# Patient Record
Sex: FEMALE | Race: WHITE | NOT HISPANIC OR LATINO | Employment: FULL TIME | URBAN - METROPOLITAN AREA
[De-identification: names, ages, dates, MRNs, and addresses within clinical notes are randomized per-mention and may not be internally consistent; named-entity substitution may affect disease eponyms.]

---

## 2017-01-30 ENCOUNTER — TELEPHONE (OUTPATIENT)
Dept: ENDOCRINOLOGY | Facility: CLINIC | Age: 27
End: 2017-01-30

## 2017-01-30 DIAGNOSIS — E10.9 TYPE 1 DIABETES MELLITUS WITHOUT COMPLICATION (H): Primary | ICD-10-CM

## 2017-01-30 DIAGNOSIS — E10.9 DIABETES MELLITUS TYPE 1 (H): ICD-10-CM

## 2017-01-30 NOTE — TELEPHONE ENCOUNTER
----- Message from Josiane Cantrell MD sent at 1/30/2017  3:15 PM CST -----  Regarding: RE: High BS  Contact: [phone]  Called her and left message asking if she was feeling sick in anyway such as UTI, URI.  If so she should call back and let us know what her symptoms are.    If she feels well I told her to change correction from the 1 to 52 I have in my last note  To 1 for 40 and to change the active insulin from 4 hours that I have in my last note to 3 hrs.  Also told her to call back if this doesn't correct the problem.    Melisa Cantrell    ----- Message -----     From: Alexa Stuart RN     Sent: 1/30/2017  11:57 AM       To: Josiane Cantrell MD  Subject: High BS                                          Mallory  Called on call  Saturday night  Around 10 pm  And was instructed to change her pump   cartridge and insertion site ( no note from on call) She has been having BS in the 300's  With no pump setting changes since seen last and no other changes to reflect the numbers   When she changed the pump  Items  her BS did go back to 200- 100 but this AM BS was 160  She did the correction  and retest 2.5 hours later was 304. She questions what to do

## 2017-01-30 NOTE — TELEPHONE ENCOUNTER
Dr Cantrell could not reach Mallory  but left a message  With question on symptoms   and possible correction change .

## 2017-03-06 ENCOUNTER — OFFICE VISIT (OUTPATIENT)
Dept: ENDOCRINOLOGY | Facility: CLINIC | Age: 27
End: 2017-03-06

## 2017-03-06 VITALS
WEIGHT: 171.7 LBS | HEIGHT: 70 IN | BODY MASS INDEX: 24.58 KG/M2 | DIASTOLIC BLOOD PRESSURE: 78 MMHG | SYSTOLIC BLOOD PRESSURE: 136 MMHG | HEART RATE: 64 BPM

## 2017-03-06 DIAGNOSIS — E10.9 TYPE 1 DIABETES MELLITUS WITHOUT COMPLICATION (H): Primary | ICD-10-CM

## 2017-03-06 LAB — HBA1C MFR BLD: 6.2 % (ref 4.3–6)

## 2017-03-06 NOTE — LETTER
3/6/2017      RE: Mallory Barajas  [address]  [facility] [address]       This 26 year old woman returns for f/u of her type 1 diabetes that was diagnosed in May 2015.  She started using a tslim pump late in 2015.  She likes it a lot and prefers it to injections  Current settings are:    Basal 0.4 around the clock    Carb ratio 1 for 15 gm   Sensitivity  1 for 50  Target is 110  Active insulin is 4 hours    Her basal is now about 52 % of ramirez dose.  She boluses 2 - 4 times a day.  92% were for food only because her numbers were in target and she didn't need a correction. She checks her sugars 3-4 times a day on different meters.  On the one she brought in today her average is 101 over the last month with a range from .  On the pump the average is 159 with range from 122-261, but she doesn't enter sugars if she is in target.     Recognizes her lows herself when they are in the 60s. Thinks she has two episodes a week.    Hasn't needed help of others. Does exercise by lifting weighs ~ 3 times a week and aerobic activities like basketball a couple of times a week. She usually takes her pump off for the activity.  She carries food with her to treat lows.   No foot concerns. Saw eye MD last year and all was fine.      Current Outpatient Prescriptions on File Prior to Visit:  acetone, Urine, test STRP Use 1 strip to check urine ketones in the event of unexplained high blood glucose, or in the case of illness.90 day supply 50 strips   blood glucose monitoring (ACCU-CHEK MACARIO PLUS) test strip Use to test blood sugar 6 times daily or as directed.   insulin lispro (HUMALOG) 100 UNIT/ML Cartridge Use in pump aprox 30 units daily   insulin lispro (HUMALOG) 100 UNIT/ML VIAL Use to fill Tandem T-Slim insulin pump. Uses approximately 30 units daily   blood glucose monitoring (ACCU-CHEK MACARIO PLUS) meter device kit Use to test blood sugar 6 times daily or as directed.   insulin pen needle 30G X 5 MM Use 4 pen needles  "daily or as directed.   glucagon 1 MG injection Inject 1 mg into the muscle once for 1 dose     No current facility-administered medications on file prior to visit.     ROS: 10 point ROS neg other than the symptoms noted above in the HPI.    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    Weight as of this encounter: 77.9 kg (171 lb 11.2 oz).    VSS  NAD  Eyes - no periorbital edema, conjunctival injection, scleral icterus  Neck - no thyromegaly  Skin - normal texture     Recent Labs   Lab Test 03/06/17 11/23/16   0939  11/23/16   0931  08/22/16   1621 08/22/16   05/29/15   1738  05/28/15   2129   A1C   --    --    --    --    --    --    --   14.0*   HEMOGLOBINA1  6.2*   --    --    --   6.4*   < >   --    --    TSH   --    --   1.05   --    --    --    --    --    T4   --    --   1.05   --    --    --    --    --    LDL   --    --   86   --    --    --    --    --    HDL   --    --   76   --    --    --    --    --    TRIG   --    --   40   --    --    --    --    --    CR   --    --   0.88   --    --    --   0.71  0.64   MICROL   --   8   --   6   --    --    --    --     < > = values in this interval not displayed.       Assessment and plan:    1.  Diabetes control.  Doing very well.  Reminded her that she needs to let us know if she doesn't start recognizing her lows until the 50s so we can back off her doses.      2.  Diabetes complications.  Only has diabetes for 2 years so none yet.    I spent 25 min with her the majority of which were spent counseling about diabetes management    F/u 4 mo with Pricila Larry and 8 mo with josue Cantrell MD    "

## 2017-03-06 NOTE — NURSING NOTE
"Chief Complaint   Patient presents with     RECHECK     DIABETES 1 F/U        Initial /78 (BP Location: Right arm, Patient Position: Chair, Cuff Size: Adult Regular)  Pulse 64  Ht 1.778 m (5' 10\")  Wt 77.9 kg (171 lb 11.2 oz)  BMI 24.64 kg/m2 Estimated body mass index is 24.64 kg/(m^2) as calculated from the following:    Height as of this encounter: 1.778 m (5' 10\").    Weight as of this encounter: 77.9 kg (171 lb 11.2 oz).  Medication Reconciliation: complete       Kenya Hamilton CMA     "

## 2017-03-06 NOTE — LETTER
3/6/2017       RE: Mallory Barajas  [address]  [facility] [address]     Dear Colleague,    Thank you for referring your patient, Mallory Barajas, to the [facility] ENDOCRINOLOGY at [facility]. Please see a copy of my visit note below.    This 26 year old woman returns for f/u of her type 1 diabetes that was diagnosed in May 2015.  She started using a tslim pump late in 2015.  She likes it a lot and prefers it to injections  Current settings are:    Basal 0.4 around the clock    Carb ratio 1 for 15 gm   Sensitivity  1 for 50  Target is 110  Active insulin is 4 hours    Her basal is now about 52 % of ramirez dose.  She boluses 2 - 4 times a day.  92% were for food only because her numbers were in target and she didn't need a correction. She checks her sugars 3-4 times a day on different meters.  On the one she brought in today her average is 101 over the last month with a range from .  On the pump the average is 159 with range from 122-261, but she doesn't enter sugars if she is in target.     Recognizes her lows herself when they are in the 60s. Thinks she has two episodes a week.    Hasn't needed help of others. Does exercise by lifting weighs ~ 3 times a week and aerobic activities like basketball a couple of times a week. She usually takes her pump off for the activity.  She carries food with her to treat lows.   No foot concerns. Saw eye MD last year and all was fine.      Current Outpatient Prescriptions on File Prior to Visit:  acetone, Urine, test STRP Use 1 strip to check urine ketones in the event of unexplained high blood glucose, or in the case of illness.90 day supply 50 strips   blood glucose monitoring (ACCU-CHEK MACARIO PLUS) test strip Use to test blood sugar 6 times daily or as directed.   insulin lispro (HUMALOG) 100 UNIT/ML Cartridge Use in pump aprox 30 units daily   insulin lispro (HUMALOG) 100 UNIT/ML VIAL Use to fill Tandem T-Slim insulin pump. Uses  "approximately 30 units daily   blood glucose monitoring (ACCU-CHEK MACARIO PLUS) meter device kit Use to test blood sugar 6 times daily or as directed.   insulin pen needle 30G X 5 MM Use 4 pen needles daily or as directed.   glucagon 1 MG injection Inject 1 mg into the muscle once for 1 dose     No current facility-administered medications on file prior to visit.     ROS: 10 point ROS neg other than the symptoms noted above in the HPI.    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    Weight as of this encounter: 77.9 kg (171 lb 11.2 oz).    VSS  NAD  Eyes - no periorbital edema, conjunctival injection, scleral icterus  Neck - no thyromegaly  Skin - normal texture     Recent Labs   Lab Test 03/06/17 11/23/16   0939  11/23/16   0931  08/22/16   1621 08/22/16   05/29/15   1738  05/28/15   2129   A1C   --    --    --    --    --    --    --   14.0*   HEMOGLOBINA1  6.2*   --    --    --   6.4*   < >   --    --    TSH   --    --   1.05   --    --    --    --    --    T4   --    --   1.05   --    --    --    --    --    LDL   --    --   86   --    --    --    --    --    HDL   --    --   76   --    --    --    --    --    TRIG   --    --   40   --    --    --    --    --    CR   --    --   0.88   --    --    --   0.71  0.64   MICROL   --   8   --   6   --    --    --    --     < > = values in this interval not displayed.       Assessment and plan:    1.  Diabetes control.  Doing very well.  Reminded her that she needs to let us know if she doesn't start recognizing her lows until the 50s so we can back off her doses.      2.  Diabetes complications.  Only has diabetes for 2 years so none yet.    I spent 25 min " with her the majority of which were spent counseling about diabetes management    F/u 4 mo with Pricila Larry and 8 mo with me      Josiane Cantrell MD                Again, thank you for allowing me to participate in the care of your patient.      Sincerely,    Josiane Cantrell MD

## 2017-03-06 NOTE — MR AVS SNAPSHOT
After Visit Summary   3/6/2017    Mallory Barajas    MRN: [id number]           Patient Information     Date Of Birth          1990        Visit Information        Provider Department      3/6/2017 4:30 PM Josiane Cantrell MD [facility] Endocrinology        Today's Diagnoses     Type 1 diabetes mellitus without complication (H)    -  1       Follow-ups after your visit        Your next 10 appointments already scheduled     2017  9:00 AM CDT   (Arrive by 8:45 AM)   RETURN DIABETES with GARY [facility] Endocrinology ([facility])    [address] [place] [id number] [phone]              Who to contact     Please call your clinic at [phone] to:    Ask questions about your health    Make or cancel appointments    Discuss your medicines    Learn about your test results    Speak to your doctor   If you have compliments or concerns about an experience at your clinic, or if you wish to file a complaint, please contact [facility] Patient Relations at [phone] or email us at [email or web address]         Additional Information About Your Visit        MyChart Information     Ghostt is an electronic gateway that provides easy, online access to your medical records. With [employer], you can request a clinic appointment, read your test results, renew a prescription or communicate with your care team.     To sign up for Ghostt visit the website at [email or web address]   You will be asked to enter the access code listed below, as well as some personal information. Please follow the directions to create your username and password.     Your access code is: [email or web address]  Expires: 5/15/2017  6:30 AM     Your access code will  in 90 days. If you need help or a new code, please contact your [facility] or call [phone] for assistance.        Care  "EveryWhere ID     This is your Care EveryWhere ID. This could be used by other organizations to access your [place] medical records  [id number]        Your Vitals Were     Pulse Height BMI (Body Mass Index)             64 1.778 m (5' 10\") 24.64 kg/m2          Blood Pressure from Last 3 Encounters:   03/06/17 136/78   11/23/16 113/74   08/22/16 113/74    Weight from Last 3 Encounters:   03/06/17 77.9 kg (171 lb 11.2 oz)   11/23/16 80.7 kg (178 lb)   08/22/16 81.6 kg (180 lb)              We Performed the Following     Hemoglobin A1c POCT          Today's Medication Changes          These changes are accurate as of: 3/6/17  5:16 PM.  If you have any questions, ask your nurse or doctor.               Stop taking these medicines if you haven't already. Please contact your care team if you have questions.     insulin aspart 100 UNIT/ML injection   Commonly known as:  NovoLOG           LANTUS SOLOSTAR 100 UNIT/ML injection   Generic drug:  insulin glargine           norgestimate-ethinyl estradiol 0.25-35 MG-MCG per tablet   Commonly known as:  ORTHO-CYCLEN, SPRINTEC                    Primary Care Provider    Physician No Ref-Primary       No address on file        Thank you!     Thank you for choosing [facility] ENDOCRINOLOGY  for your care. Our goal is always to provide you with excellent care. Hearing back from our patients is one way we can continue to improve our services. Please take a few minutes to complete the written survey that you may receive in the mail after your visit with us. Thank you!             Your Updated Medication List - Protect others around you: Learn how to safely use, store and throw away your medicines at www.disposemymeds.org.          This list is accurate as of: 3/6/17  5:16 PM.  Always use your most recent med list.                   Brand Name Dispense Instructions for use    acetone (Urine) test Strp     50 each    Use 1 strip to check urine ketones in the event of unexplained high " blood glucose, or in the case of illness.90 day supply 50 strips       blood glucose monitoring meter device kit     1 kit    Use to test blood sugar 6 times daily or as directed.       blood glucose monitoring test strip    ACCU-CHEK MACARIO PLUS    540 each    Use to test blood sugar 6 times daily or as directed.       glucagon 1 MG kit     1 mg    Inject 1 mg into the muscle once for 1 dose       * insulin lispro 100 UNIT/ML injection    humaLOG    3 vial    Use to fill Tandem T-Slim insulin pump. Uses approximately 30 units daily       * insulin lispro 100 UNIT/ML Cartridge    HUMALOG    30 mL    Use in pump aprox 30 units daily       insulin pen needle 30G X 5 MM     120 each    Use 4 pen needles daily or as directed.       * Notice:  This list has 2 medication(s) that are the same as other medications prescribed for you. Read the directions carefully, and ask your doctor or other care provider to review them with you.

## 2017-03-06 NOTE — PROGRESS NOTES
This 26 year old woman returns for f/u of her type 1 diabetes that was diagnosed in May 2015.  She started using a tslim pump late in 2015.  She likes it a lot and prefers it to injections  Current settings are:    Basal 0.4 around the clock    Carb ratio 1 for 15 gm   Sensitivity  1 for 50  Target is 110  Active insulin is 4 hours    Her basal is now about 52 % of ramirez dose.  She boluses 2 - 4 times a day.  92% were for food only because her numbers were in target and she didn't need a correction. She checks her sugars 3-4 times a day on different meters.  On the one she brought in today her average is 101 over the last month with a range from .  On the pump the average is 159 with range from 122-261, but she doesn't enter sugars if she is in target.     Recognizes her lows herself when they are in the 60s. Thinks she has two episodes a week.    Hasn't needed help of others. Does exercise by lifting weighs ~ 3 times a week and aerobic activities like basketball a couple of times a week. She usually takes her pump off for the activity.  She carries food with her to treat lows.   No foot concerns. Saw eye MD last year and all was fine.      Current Outpatient Prescriptions on File Prior to Visit:  acetone, Urine, test STRP Use 1 strip to check urine ketones in the event of unexplained high blood glucose, or in the case of illness.90 day supply 50 strips   blood glucose monitoring (ACCU-CHEK MACARIO PLUS) test strip Use to test blood sugar 6 times daily or as directed.   insulin lispro (HUMALOG) 100 UNIT/ML Cartridge Use in pump aprox 30 units daily   insulin lispro (HUMALOG) 100 UNIT/ML VIAL Use to fill Tandem T-Slim insulin pump. Uses approximately 30 units daily   blood glucose monitoring (ACCU-CHEK MACARIO PLUS) meter device kit Use to test blood sugar 6 times daily or as directed.   insulin pen needle 30G X 5 MM Use 4 pen needles daily or as directed.   glucagon 1 MG injection Inject 1 mg into the muscle once  "for 1 dose     No current facility-administered medications on file prior to visit.     ROS: 10 point ROS neg other than the symptoms noted above in the HPI.    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    Weight as of this encounter: 77.9 kg (171 lb 11.2 oz).    VSS  NAD  Eyes - no periorbital edema, conjunctival injection, scleral icterus  Neck - no thyromegaly  Skin - normal texture     Recent Labs   Lab Test 03/06/17 11/23/16   0939  11/23/16   0931  08/22/16   1621 08/22/16   05/29/15   1738  05/28/15   2129   A1C   --    --    --    --    --    --    --   14.0*   HEMOGLOBINA1  6.2*   --    --    --   6.4*   < >   --    --    TSH   --    --   1.05   --    --    --    --    --    T4   --    --   1.05   --    --    --    --    --    LDL   --    --   86   --    --    --    --    --    HDL   --    --   76   --    --    --    --    --    TRIG   --    --   40   --    --    --    --    --    CR   --    --   0.88   --    --    --   0.71  0.64   MICROL   --   8   --   6   --    --    --    --     < > = values in this interval not displayed.       Assessment and plan:    1.  Diabetes control.  Doing very well.  Reminded her that she needs to let us know if she doesn't start recognizing her lows until the 50s so we can back off her doses.      2.  Diabetes complications.  Only has diabetes for 2 years so none yet.    I spent 25 min with her the majority of which were spent counseling about diabetes management    F/u 4 mo with Pricila Larry and 8 mo with josue Cantrell MD              "

## 2017-07-14 ENCOUNTER — OFFICE VISIT (OUTPATIENT)
Dept: ENDOCRINOLOGY | Facility: CLINIC | Age: 27
End: 2017-07-14

## 2017-07-14 VITALS
DIASTOLIC BLOOD PRESSURE: 77 MMHG | WEIGHT: 171.2 LBS | BODY MASS INDEX: 24.51 KG/M2 | HEART RATE: 68 BPM | HEIGHT: 70 IN | SYSTOLIC BLOOD PRESSURE: 117 MMHG

## 2017-07-14 DIAGNOSIS — E10.65 TYPE 1 DIABETES MELLITUS WITH HYPERGLYCEMIA (H): Primary | ICD-10-CM

## 2017-07-14 LAB — HBA1C MFR BLD: 6.3 % (ref 4.3–6)

## 2017-07-14 ASSESSMENT — PAIN SCALES - GENERAL: PAINLEVEL: NO PAIN (0)

## 2017-07-14 NOTE — PROGRESS NOTES
HPI  Mallory Barajas is a 27 year old female with type 1 diabetes mellitus here today for a follow up visit.  Pt was dx having type 1 diabetes mellitus in May 2015.   Mallory is currently taking using a Tandem G4 insulin pump.  Her basal insulin rate is set at 0.15 units/hr x 24 hrs and I/C ratio is 1:15.  Mallory's A1C is 6.3  % today.  No frequent hypoglycemia.  She had one low blood sugar 68 over the past month.  Her average glucose was 143 with SD 43 over the past month.  She remains very active.  She has a well balanced diet.  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  On ROS today, doing very well.  No visual complaints.   Pt denies n/v, SOB at rest or cough.   No chest pain, abd pain, diarrhea, dysuria or hematuria.  She denies numbness or tingling in her feet or hands.    ROS  Please see under HPI.    Allergies  No Known Allergies    Medications  Current Outpatient Prescriptions   Medication Sig Dispense Refill     acetone, Urine, test STRP Use 1 strip to check urine ketones in the event of unexplained high blood glucose, or in the case of illness.90 day supply 50 strips 50 each 3     blood glucose monitoring (ACCU-CHEK MACARIO PLUS) test strip Use to test blood sugar 6 times daily or as directed. 540 each 3     insulin lispro (HUMALOG) 100 UNIT/ML Cartridge Use in pump aprox 30 units daily 30 mL 3     insulin lispro (HUMALOG) 100 UNIT/ML VIAL Use to fill Tandem T-Slim insulin pump. Uses approximately 30 units daily 3 vial 3     blood glucose monitoring (ACCU-CHEK MACARIO PLUS) meter device kit Use to test blood sugar 6 times daily or as directed. 1 kit 0     insulin pen needle 30G X 5 MM Use 4 pen needles daily or as directed. 120 each 12     glucagon 1 MG injection Inject 1 mg into the muscle once for 1 dose 1 mg 0     Family History  Mother with hx of type 2 diabetes.  First cousin with hx of type 1 diabetes.    Social History  Smoke: none.  ETOH: rare.  Single and no children.  Occupation:  ( 6 th grade  ").   Exercise: yes; she is very athletic.    Past Medical History  Past Medical History:   Diagnosis Date     NO ACTIVE PROBLEMS (aka NONE)      Past Surgical History:   Procedure Laterality Date     ARTHROSCOPY KNEE BILATERAL         Physical Exam  /77 (BP Location: Right arm, Patient Position: Sitting, Cuff Size: Adult Regular)  Pulse 68  Ht 1.778 m (5' 10\")  Wt 77.7 kg (171 lb 3.2 oz)  BMI 24.56 kg/m2  Body mass index is 24.56 kg/(m^2).    GENERAL : In no apparent distress  SKIN: Normal.  EYES: PERRLA.   RESP: Lungs clear to auscultation.  CARDIAC: RRR.  ABDOMEN: Normal.        NEURO: awake, alert, responds appropriately to questions.    Moves all extremities; Gait normal.    EXTREMITIES: no edema.  FEET:  No ulcers. Normal monofilamentous exam.    RESULTS  Creatinine   Date Value Ref Range Status   11/23/2016 0.88 0.52 - 1.04 mg/dL Final     GFR Estimate   Date Value Ref Range Status   11/23/2016 77 >60 mL/min/1.7m2 Final     Comment:     Non  GFR Calc     Hemoglobin A1C   Date Value Ref Range Status   05/28/2015 14.0 (H) 4.3 - 6.0 % Final     Potassium   Date Value Ref Range Status   05/29/2015 3.5 3.4 - 5.3 mmol/L Final     ALT   Date Value Ref Range Status   11/23/2016 12 0 - 50 U/L Final     AST   Date Value Ref Range Status   05/29/2015 8 0 - 45 U/L Final     TSH   Date Value Ref Range Status   11/23/2016 1.05 0.40 - 4.00 mU/L Final     T4 Free   Date Value Ref Range Status   11/23/2016 1.05 0.76 - 1.46 ng/dL Final     A1C      6.3    7/14/2017  A1C      6.2    3/6/2017  A1C      5.7    11/23/2016  A1C      6.4    8/22/2016  A1C      6.5    1/13/2016  A1C      7.3    8/20/2015  A1C      7.2    8/7/2015.  A1C     14.0   5/28/2015    ASSESSMENT/PLAN:    1. TYPE 1 DIABETES MELLITUS: Mallory continues to do a great job with her diabetes care and her blood sugar values look good at this time.  No frequent hypoglycemia.  No change in insulin rates today.  Pt remains normotensive.  Feet are " ok.  She had her eyes examined done 2 weeks ago while in [place]- no evidence of retinopathy.  Pt's urine microalbuminuria negative in Nov 2016 with a normal creat/GFR.  Pt's TSH was normal.  Her LDL was 86 in Nov 2016.    2.  Return to Endocrine Clinic to see Dr. Cantrell in Nov 2017.

## 2017-07-14 NOTE — NURSING NOTE
"Chief Complaint   Patient presents with     RECHECK     DIABETES TYPE 1 F/U        Initial /77 (BP Location: Right arm, Patient Position: Sitting, Cuff Size: Adult Regular)  Pulse 68  Ht 1.778 m (5' 10\")  Wt 77.7 kg (171 lb 3.2 oz)  BMI 24.56 kg/m2 Estimated body mass index is 24.56 kg/(m^2) as calculated from the following:    Height as of this encounter: 1.778 m (5' 10\").    Weight as of this encounter: 77.7 kg (171 lb 3.2 oz).  Medication Reconciliation: complete     Performed A1C test - patient tolerated well.    Kenya Hamilton, [facility]       "

## 2017-07-14 NOTE — LETTER
7/14/2017       RE: Mallory Barajas  [address]  [facility] [address]     Dear Colleague,    Thank you for referring your patient, Mallory Barajas, to the [facility] ENDOCRINOLOGY at [facility]. Please see a copy of my visit note below.    HPI  Mallory Barajas is a 27 year old female with type 1 diabetes mellitus here today for a follow up visit.  Pt was dx having type 1 diabetes mellitus in May 2015.   Mallory is currently taking using a Tandem G4 insulin pump.  Her basal insulin rate is set at 0.15 units/hr x 24 hrs and I/C ratio is 1:15.  Mallory's A1C is 6.3  % today.  No frequent hypoglycemia.  She had one low blood sugar 68 over the past month.  Her average glucose was 143 with SD 43 over the past month.  She remains very active.  She has a well balanced diet.  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  On ROS today, doing very well.  No visual complaints.   Pt denies n/v, SOB at rest or cough.   No chest pain, abd pain, diarrhea, dysuria or hematuria.  She denies numbness or tingling in her feet or hands.    ROS  Please see under HPI.    Allergies  No Known Allergies    Medications  Current Outpatient Prescriptions   Medication Sig Dispense Refill     acetone, Urine, test STRP Use 1 strip to check urine ketones in the event of unexplained high blood glucose, or in the case of illness.90 day supply 50 strips 50 each 3     blood glucose monitoring (ACCU-CHEK MACARIO PLUS) test strip Use to test blood sugar 6 times daily or as directed. 540 each 3     insulin lispro (HUMALOG) 100 UNIT/ML Cartridge Use in pump aprox 30 units daily 30 mL 3     insulin lispro (HUMALOG) 100 UNIT/ML VIAL Use to fill Tandem T-Slim insulin pump. Uses approximately 30 units daily 3 vial 3     blood glucose monitoring (ACCU-CHEK MACARIO PLUS) meter device kit Use to test blood sugar 6 times daily or as directed. 1 kit 0     insulin pen needle 30G X 5 MM Use 4 pen needles daily or as directed. 120 each 12      "glucagon 1 MG injection Inject 1 mg into the muscle once for 1 dose 1 mg 0     Family History  Mother with hx of type 2 diabetes.  First cousin with hx of type 1 diabetes.    Social History  Smoke: none.  ETOH: rare.  Single and no children.  Occupation:  ( 6 th grade ).   Exercise: yes; she is very athletic.    Past Medical History  Past Medical History:   Diagnosis Date     NO ACTIVE PROBLEMS (aka NONE)      Past Surgical History:   Procedure Laterality Date     ARTHROSCOPY KNEE BILATERAL         Physical Exam  /77 (BP Location: Right arm, Patient Position: Sitting, Cuff Size: Adult Regular)  Pulse 68  Ht 1.778 m (5' 10\")  Wt 77.7 kg (171 lb 3.2 oz)  BMI 24.56 kg/m2  Body mass index is 24.56 kg/(m^2).    GENERAL : In no apparent distress  SKIN: Normal.  EYES: PERRLA.   RESP: Lungs clear to auscultation.  CARDIAC: RRR.  ABDOMEN: Normal.        NEURO: awake, alert, responds appropriately to questions.    Moves all extremities; Gait normal.    EXTREMITIES: no edema.  FEET:  No ulcers. Normal monofilamentous exam.    RESULTS  Creatinine   Date Value Ref Range Status   11/23/2016 0.88 0.52 - 1.04 mg/dL Final     GFR Estimate   Date Value Ref Range Status   11/23/2016 77 >60 mL/min/1.7m2 Final     Comment:     Non  GFR Calc     Hemoglobin A1C   Date Value Ref Range Status   05/28/2015 14.0 (H) 4.3 - 6.0 % Final     Potassium   Date Value Ref Range Status   05/29/2015 3.5 3.4 - 5.3 mmol/L Final     ALT   Date Value Ref Range Status   11/23/2016 12 0 - 50 U/L Final     AST   Date Value Ref Range Status   05/29/2015 8 0 - 45 U/L Final     TSH   Date Value Ref Range Status   11/23/2016 1.05 0.40 - 4.00 mU/L Final     T4 Free   Date Value Ref Range Status   11/23/2016 1.05 0.76 - 1.46 ng/dL Final     A1C      6.3    7/14/2017  A1C      6.2    3/6/2017  A1C      5.7    11/23/2016  A1C      6.4    8/22/2016  A1C      6.5    1/13/2016  A1C      7.3    8/20/2015  A1C      7.2    " 8/7/2015.  A1C     14.0   5/28/2015    ASSESSMENT/PLAN:    1. TYPE 1 DIABETES MELLITUS: Mallory continues to do a great job with her diabetes care and her blood sugar values look good at this time.  No frequent hypoglycemia.  No change in insulin rates today.  Pt remains normotensive.  Feet are ok.  She had her eyes examined done 2 weeks ago while in [place]- no evidence of retinopathy.  Pt's urine microalbuminuria negative in Nov 2016 with a normal creat/GFR.  Pt's TSH was normal.  Her LDL was 86 in Nov 2016.    2.  Return to Endocrine Clinic to see Dr. Cantrell in Nov 2017.      Sara Larry PA-C

## 2017-07-14 NOTE — MR AVS SNAPSHOT
After Visit Summary   2017    Mallory Barajas    MRN: [id number]           Patient Information     Date Of Birth          1990        Visit Information        Provider Department      2017 9:00 AM Sara Larry PA-C [facility] Endocrinology        Today's Diagnoses     Type 1 diabetes mellitus with hyperglycemia (H)    -  1       Follow-ups after your visit        Your next 10 appointments already scheduled     2017  3:30 PM CST   (Arrive by 3:15 PM)   RETURN DIABETES with MD ABDULLAHI Wan [facility] Endocrinology ([facility] [place])    [address] [place] [id number] [phone]              Who to contact     Please call your clinic at [phone] to:    Ask questions about your health    Make or cancel appointments    Discuss your medicines    Learn about your test results    Speak to your doctor   If you have compliments or concerns about an experience at your clinic, or if you wish to file a complaint, please contact [facility] Patient Relations at [phone] or email us at [email or web address]         Additional Information About Your Visit        MyChart Information     [employer] is an electronic gateway that provides easy, online access to your medical records. With [employer], you can request a clinic appointment, read your test results, renew a prescription or communicate with your care team.     To sign up for [employer] visit the website at [email or web address]   You will be asked to enter the access code listed below, as well as some personal information. Please follow the directions to create your username and password.     Your access code is: [email or web address]  Expires: 2017  6:30 AM     Your access code will  in 90 days. If you need help or a new code, please contact your [facility] Clinic or call [phone] for assistance.        Care  "EveryWhere ID     This is your Care EveryWhere ID. This could be used by other organizations to access your [facility] medical records  [id number]        Your Vitals Were     Pulse Height BMI (Body Mass Index)             68 1.778 m (5' 10\") 24.56 kg/m2          Blood Pressure from Last 3 Encounters:   07/14/17 117/77   03/06/17 136/78   11/23/16 113/74    Weight from Last 3 Encounters:   07/14/17 77.7 kg (171 lb 3.2 oz)   03/06/17 77.9 kg (171 lb 11.2 oz)   11/23/16 80.7 kg (178 lb)              Today, you had the following     No orders found for display       Primary Care Provider    Physician No Ref-Primary       No address on file        Equal Access to Services     ZAIRE SMITH : Hadii ibis banerjeeo Sosalo, waaxda luqadaha, qaybta kaalmada adeegyada, dayana tang . So [facility] [phone].    ATENCIÓN: Si habla español, tiene a [place] disposición servicios gratuitos de asistencia lingüística. Llame al [phone].    We comply with applicable federal civil rights laws and [place] laws. We do not discriminate on the basis of race, color, national origin, age, disability sex, sexual orientation or gender identity.            Thank you!     Thank you for choosing [facility]  for your care. Our goal is always to provide you with excellent care. Hearing back from our patients is one way we can continue to improve our services. Please take a few minutes to complete the written survey that you may receive in the mail after your visit with us. Thank you!             Your Updated Medication List - Protect others around you: Learn how to safely use, store and throw away your medicines at www.disposemymeds.org.          This list is accurate as of: 7/14/17  9:29 AM.  Always use your most recent med list.                   Brand Name Dispense Instructions for use Diagnosis    acetone (Urine) test Strp     50 each    Use 1 strip to check urine ketones in the event of unexplained high " blood glucose, or in the case of illness.90 day supply 50 strips    Type 1 diabetes mellitus without complication (H)       blood glucose monitoring meter device kit     1 kit    Use to test blood sugar 6 times daily or as directed.    Diabetes mellitus type 1 (H)       blood glucose monitoring test strip    ACCU-CHEK MACARIO PLUS    540 each    Use to test blood sugar 6 times daily or as directed.    Diabetes mellitus type 1 (H)       glucagon 1 MG kit     1 mg    Inject 1 mg into the muscle once for 1 dose    Diabetes mellitus type 1 (H)       * insulin lispro 100 UNIT/ML injection    humaLOG    3 vial    Use to fill Tandem T-Slim insulin pump. Uses approximately 30 units daily    Diabetes mellitus type 1 (H)       * insulin lispro 100 UNIT/ML Cartridge    HUMALOG    30 mL    Use in pump aprox 30 units daily    Diabetes mellitus type 1 (H)       insulin pen needle 30G X 5 MM     120 each    Use 4 pen needles daily or as directed.    Diabetes mellitus type 1 (H)       * Notice:  This list has 2 medication(s) that are the same as other medications prescribed for you. Read the directions carefully, and ask your doctor or other care provider to review them with you.

## 2017-08-18 DIAGNOSIS — E10.9 DIABETES MELLITUS TYPE 1 (H): ICD-10-CM

## 2017-11-14 ENCOUNTER — OFFICE VISIT (OUTPATIENT)
Dept: ENDOCRINOLOGY | Facility: CLINIC | Age: 27
End: 2017-11-14

## 2017-11-14 VITALS
HEIGHT: 70 IN | HEART RATE: 88 BPM | BODY MASS INDEX: 24.58 KG/M2 | SYSTOLIC BLOOD PRESSURE: 123 MMHG | DIASTOLIC BLOOD PRESSURE: 72 MMHG | WEIGHT: 171.7 LBS

## 2017-11-14 DIAGNOSIS — E10.9 TYPE 1 DIABETES MELLITUS WITHOUT COMPLICATION (H): ICD-10-CM

## 2017-11-14 DIAGNOSIS — Z23 NEED FOR PROPHYLACTIC VACCINATION AND INOCULATION AGAINST INFLUENZA: Primary | ICD-10-CM

## 2017-11-14 NOTE — NURSING NOTE
"Chief Complaint   Patient presents with     RECHECK     DIABETES TYPE 1 F/U        Initial /72 (BP Location: Right arm, Patient Position: Sitting, Cuff Size: Adult Regular)  Pulse 88  Ht 1.778 m (5' 10\")  Wt 77.9 kg (171 lb 11.2 oz)  BMI 24.64 kg/m2 Estimated body mass index is 24.64 kg/(m^2) as calculated from the following:    Height as of this encounter: 1.778 m (5' 10\").    Weight as of this encounter: 77.9 kg (171 lb 11.2 oz).  Medication Reconciliation: complete       Performed A1C test - patient tolerated well.    Kenya Hamilton, [facility]       "

## 2017-11-14 NOTE — PROGRESS NOTES
This 26 year old woman returns for f/u of her type 1 diabetes that was diagnosed in May 2015.  She started using a tslim pump late in 2015.  She likes it a lot and prefers it to injections  Current settings are:    Basal 0.15 around the clock    Carb ratio 1 for 15 gm   Sensitivity  1 for 50  Target is 110  Active insulin is 4 hours    Her basal is now about 19 % of ramirez dose.  She boluses 2 - 4 times a day.  73 % were for food only because her numbers were in target and she didn't need a correction. She checks her sugars 3-4 times a day. Her average over the last month was 138 with range of 56 - 321. She thinks she is having many fewer lows than in the past.  She recognizes her lows herself when they are in the 60s. Thinks she has two episodes a week.    Hasn't needed help of others. Does exercise by lifting weighs ~ 3 times a week and aerobic activities like basketball a couple of times a week. She usually doesn't change her basal rates for the activity.  She carries food with her to treat lows.   No foot concerns. Saw eye MD last year and all was fine. Feels well and has no concerns.  Menses are regular.      Current Outpatient Prescriptions on File Prior to Visit:  insulin lispro (HUMALOG) 100 UNIT/ML injection Use to fill Tandem T-Slim insulin pump. Uses approximately 30 units daily   acetone, Urine, test STRP Use 1 strip to check urine ketones in the event of unexplained high blood glucose, or in the case of illness.90 day supply 50 strips   blood glucose monitoring (ACCU-CHEK MACARIO PLUS) test strip Use to test blood sugar 6 times daily or as directed.   insulin lispro (HUMALOG) 100 UNIT/ML Cartridge Use in pump aprox 30 units daily   blood glucose monitoring (ACCU-CHEK MACARIO PLUS) meter device kit Use to test blood sugar 6 times daily or as directed.   insulin pen needle 30G X 5 MM Use 4 pen needles daily or as directed.   glucagon 1 MG injection Inject 1 mg into the muscle once for 1 dose     No current  "facility-administered medications on file prior to visit.     ROS: 10 point ROS neg other than the symptoms noted above in the HPI.    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    Weight as of this encounter: 77.9 kg (171 lb 11.2 oz).    VSS  NAD  Eyes - no periorbital edema, conjunctival injection, scleral icterus  Neck - no thyromegaly  Skin - normal texture     Recent Labs   Lab Test 07/14/17 03/06/17 11/23/16   0939  11/23/16   0931  08/22/16   1621   05/29/15   1738  05/28/15   2129   A1C   --    --    --    --    --    --    --   14.0*   HEMOGLOBINA1  6.3*  6.2*   --    --    --    < >   --    --    TSH   --    --    --   1.05   --    --    --    --    T4   --    --    --   1.05   --    --    --    --    LDL   --    --    --   86   --    --    --    --    HDL   --    --    --   76   --    --    --    --    TRIG   --    --    --   40   --    --    --    --    CR   --    --    --   0.88   --    --   0.71  0.64   MICROL   --    --   8   --   6   --    --    --     < > = values in this interval not displayed.       A1c today 6.3    Assessment and plan:    1.  Diabetes control. She is doing a great job of managing her sugars.  It is surprising she is doing this well with only 19% of her daily insulin being given as basal, but she exercises a lot and that may be part of the reason. I don't see any changes that should be made today.    2.  Diabetes complications.  Only has 2.5 years of dm.  No need to screen for microvascular complications until year 5.    3. CVD risk.  BP and lipids are at target.    F/u 4 mo with Pricila Larry and 4 mo with me      I spent 25 minutes with this patient face to face and explained " the conditions and plans (more than 50% of time was counseling/coordination of diabetes care) . The patient understood and is satisfied with today's visit.     Josiane Cantrell MD

## 2017-11-14 NOTE — LETTER
11/14/2017       RE: Mallory Barajas  [address]  [facility] [address]     Dear Colleague,    Thank you for referring your patient, Mallory Barajas, to the [facility] ENDOCRINOLOGY at [facility]. Please see a copy of my visit note below.    This 26 year old woman returns for f/u of her type 1 diabetes that was diagnosed in May 2015.  She started using a tslim pump late in 2015.  She likes it a lot and prefers it to injections  Current settings are:    Basal 0.15 around the clock    Carb ratio 1 for 15 gm   Sensitivity  1 for 50  Target is 110  Active insulin is 4 hours    Her basal is now about 19 % of ramirez dose.  She boluses 2 - 4 times a day.  73 % were for food only because her numbers were in target and she didn't need a correction. She checks her sugars 3-4 times a day. Her average over the last month was 138 with range of 56 - 321. She thinks she is having many fewer lows than in the past.  She recognizes her lows herself when they are in the 60s. Thinks she has two episodes a week.    Hasn't needed help of others. Does exercise by lifting weighs ~ 3 times a week and aerobic activities like basketball a couple of times a week. She usually doesn't change her basal rates for the activity.  She carries food with her to treat lows.   No foot concerns. Saw eye MD last year and all was fine. Feels well and has no concerns.  Menses are regular.      Current Outpatient Prescriptions on File Prior to Visit:  insulin lispro (HUMALOG) 100 UNIT/ML injection Use to fill Tandem T-Slim insulin pump. Uses approximately 30 units daily   acetone, Urine, test STRP Use 1 strip to check urine ketones in the event of unexplained high blood glucose, or in the case of illness.90 day supply 50 strips   blood glucose monitoring (ACCU-CHEK MACARIO PLUS) test strip Use to test blood sugar 6 times daily or as directed.   insulin lispro (HUMALOG) 100 UNIT/ML Cartridge Use in pump aprox 30 units daily  "  blood glucose monitoring (ACCU-CHEK MACARIO PLUS) meter device kit Use to test blood sugar 6 times daily or as directed.   insulin pen needle 30G X 5 MM Use 4 pen needles daily or as directed.   glucagon 1 MG injection Inject 1 mg into the muscle once for 1 dose     No current facility-administered medications on file prior to visit.     ROS: 10 point ROS neg other than the symptoms noted above in the HPI.    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    Weight as of this encounter: 77.9 kg (171 lb 11.2 oz).    VSS  NAD  Eyes - no periorbital edema, conjunctival injection, scleral icterus  Neck - no thyromegaly  Skin - normal texture     Recent Labs   Lab Test 07/14/17 03/06/17 11/23/16   0939  11/23/16   0931  08/22/16   1621   05/29/15   1738  05/28/15   2129   A1C   --    --    --    --    --    --    --   14.0*   HEMOGLOBINA1  6.3*  6.2*   --    --    --    < >   --    --    TSH   --    --    --   1.05   --    --    --    --    T4   --    --    --   1.05   --    --    --    --    LDL   --    --    --   86   --    --    --    --    HDL   --    --    --   76   --    --    --    --    TRIG   --    --    --   40   --    --    --    --    CR   --    --    --   0.88   --    --   0.71  0.64   MICROL   --    --   8   --   6   --    --    --     < > = values in this interval not displayed.       A1c today 6.3    Assessment and plan:    1.  Diabetes control. She is doing a great job of managing her sugars.  It is surprising she is doing this well with only 19% of her daily insulin being given as basal, but she exercises a lot and that may be part of the reason. I don't see any changes that should be made " today.    2.  Diabetes complications.  Only has 2.5 years of dm.  No need to screen for microvascular complications until year 5.    3. CVD risk.  BP and lipids are at target.    F/u 4 mo with Pricila Larry and 4 mo with me      I spent 25 minutes with this patient face to face and explained the conditions and plans (more than 50% of time was counseling/coordination of diabetes care) . The patient understood and is satisfied with today's visit.     Josiane Cantrell MD          Injectable Influenza Immunization Documentation    1.  Is the person to be vaccinated sick today?   No    2. Does the person to be vaccinated have an allergy to a component   of the vaccine?   No  Egg Allergy Algorithm Link    3. Has the person to be vaccinated ever had a serious reaction   to influenza vaccine in the past?   No    4. Has the person to be vaccinated ever had Guillain-Barré syndrome?   No    Form completed by RASHARD SPEARS [facility]

## 2017-11-14 NOTE — MR AVS SNAPSHOT
After Visit Summary   2017    Mallory Barajas    MRN: [id number]           Patient Information     Date Of Birth          1990        Visit Information        Provider Department      2017 3:30 PM Josiane Cantrell MD  Health Endocrinology        Today's Diagnoses     Need for prophylactic vaccination and inoculation against influenza    -  1    Type 1 diabetes mellitus without complication (H)           Follow-ups after your visit        Your next 10 appointments already scheduled     Mar 09, 2018  4:00 PM CST   (Arrive by 3:45 PM)   RETURN DIABETES with GARY Victoria [facility] Endocrinology ([facility])    [address] [place] [address] [id number] [phone]              Who to contact     Please call your clinic at [phone] to:    Ask questions about your health    Make or cancel appointments    Discuss your medicines    Learn about your test results    Speak to your doctor   If you have compliments or concerns about an experience at your clinic, or if you wish to file a complaint, please contact [facility] Patient Relations at [phone] or email us at [email or web address]         Additional Information About Your Visit        MyChart Information     Feathrt is an electronic gateway that provides easy, online access to your medical records. With Graspr, you can request a clinic appointment, read your test results, renew a prescription or communicate with your care team.     To sign up for Feathrt visit the website at [email or web address]   You will be asked to enter the access code listed below, as well as some personal information. Please follow the directions to create your username and password.     Your access code is: [email or web address]  Expires: 2018  5:30 AM     Your access code will  in 90 days. If you need help or a new code, please contact your [facility] or call [phone] for assistance.        Care EveryWhere ID     This is your Care EveryWhere ID. This could be used by other organizations to access your [place] medical records  [id number]        Your Vitals Were     Pulse Height BMI (Body Mass Index)             88 1.778 m (5' 10\") 24.64 kg/m2          Blood Pressure from Last 3 Encounters:   11/14/17 123/72   07/14/17 117/77   03/06/17 136/78    Weight from Last 3 Encounters:   11/14/17 77.9 kg (171 lb 11.2 oz)   07/14/17 77.7 kg (171 lb 3.2 oz)   03/06/17 77.9 kg (171 lb 11.2 oz)              We Performed the Following     C FLU VAC QUADRIVALENT SPLIT VIRUS 3+YRS IM        Primary Care Provider    Physician No Ref-Primary       NO REF-PRIMARY PHYSICIAN        Equal Access to Services     [facility] : Hadii ibis Graham, waaxda franklin, qaybta kaalmada noa, dayana tang . So [facility] [phone].    ATENCIÓN: Si habla español, tiene a [place] disposición servicios gratuitos de asistencia lingüística. Llame al [phone].    We comply with applicable federal civil rights laws and [place] laws. We do not discriminate on the basis of race, color, national origin, age, disability, sex, sexual orientation, or gender identity.            Thank you!     Thank you for choosing [facility] ENDOCRINOLOGY  for your care. Our goal is always to provide you with excellent care. Hearing back from our patients is one way we can continue to improve our services. Please take a few minutes to complete the written survey that you may receive in the mail after your visit with us. Thank you!             Your Updated Medication List - Protect others around you: Learn how to safely use, store and throw away your medicines at www.disposemymeds.org.          This list is accurate as of: 11/14/17  4:13 PM.  Always use your most recent med list.                   Brand Name Dispense Instructions for use Diagnosis "    acetone (Urine) test Strp     50 each    Use 1 strip to check urine ketones in the event of unexplained high blood glucose, or in the case of illness.90 day supply 50 strips    Type 1 diabetes mellitus without complication (H)       blood glucose monitoring meter device kit     1 kit    Use to test blood sugar 6 times daily or as directed.    Diabetes mellitus type 1 (H)       blood glucose monitoring test strip    ACCU-CHEK MACARIO PLUS    540 each    Use to test blood sugar 6 times daily or as directed.    Diabetes mellitus type 1 (H)       glucagon 1 MG kit     1 mg    Inject 1 mg into the muscle once for 1 dose    Diabetes mellitus type 1 (H)       * insulin lispro 100 UNIT/ML Cartridge    HUMALOG    30 mL    Use in pump aprox 30 units daily    Diabetes mellitus type 1 (H)       * insulin lispro 100 UNIT/ML injection    humaLOG    3 vial    Use to fill Tandem T-Slim insulin pump. Uses approximately 30 units daily    Diabetes mellitus type 1 (H)       insulin pen needle 30G X 5 MM     120 each    Use 4 pen needles daily or as directed.    Diabetes mellitus type 1 (H)       * Notice:  This list has 2 medication(s) that are the same as other medications prescribed for you. Read the directions carefully, and ask your doctor or other care provider to review them with you.

## 2017-11-14 NOTE — PROGRESS NOTES

## 2018-01-31 DIAGNOSIS — E10.9 DIABETES MELLITUS TYPE 1 (H): ICD-10-CM

## 2018-01-31 RX ORDER — BLOOD SUGAR DIAGNOSTIC
STRIP MISCELLANEOUS
Qty: 550 EACH | Refills: 3 | Status: SHIPPED | OUTPATIENT
Start: 2018-01-31 | End: 2019-06-25

## 2018-03-09 ENCOUNTER — OFFICE VISIT (OUTPATIENT)
Dept: ENDOCRINOLOGY | Facility: CLINIC | Age: 28
End: 2018-03-09
Payer: COMMERCIAL

## 2018-03-09 VITALS
SYSTOLIC BLOOD PRESSURE: 119 MMHG | DIASTOLIC BLOOD PRESSURE: 82 MMHG | HEART RATE: 82 BPM | BODY MASS INDEX: 23.89 KG/M2 | WEIGHT: 166.5 LBS

## 2018-03-09 DIAGNOSIS — E10.8 TYPE 1 DIABETES MELLITUS WITH COMPLICATIONS (H): Primary | ICD-10-CM

## 2018-03-09 LAB — HBA1C MFR BLD: 6.1 % (ref 4.3–6)

## 2018-03-09 ASSESSMENT — PAIN SCALES - GENERAL: PAINLEVEL: NO PAIN (0)

## 2018-03-09 NOTE — MR AVS SNAPSHOT
After Visit Summary   3/9/2018    Mallory Barajas    MRN: [id number]           Patient Information     Date Of Birth          1990        Visit Information        Provider Department      3/9/2018 4:00 PM Sara Lrary PA-C [facility] Endocrinology        Today's Diagnoses     Type 1 diabetes mellitus with complications (H)    -  1       Follow-ups after your visit        Your next 10 appointments already scheduled     Jul 31, 2018  3:30 PM CDT   (Arrive by 3:15 PM)   RETURN DIABETES with MD ABDULLAHI Wan [facility] Endocrinology ([facility])    [address] [place] [id number] [phone]              Future tests that were ordered for you today     Open Future Orders        Priority Expected Expires Ordered    Albumin Random Urine Quantitative with Creat Ratio Routine 3/9/2018 7/1/2018 3/9/2018    Creatinine Routine 3/9/2018 7/1/2018 3/9/2018    Lipid Profile Routine 3/9/2018 7/1/2018 3/9/2018    TSH Routine 3/9/2018 7/1/2018 3/9/2018    AST Routine 3/9/2018 7/1/2018 3/9/2018    ALT Routine 3/9/2018 7/1/2018 3/9/2018    T4 free Routine 3/9/2018 7/1/2018 3/9/2018            Who to contact     Please call your clinic at [phone] to:    Ask questions about your health    Make or cancel appointments    Discuss your medicines    Learn about your test results    Speak to your doctor            Additional Information About Your Visit        MyChart Information     Efficas is an electronic gateway that provides easy, online access to your medical records. With Efficas, you can request a clinic appointment, read your test results, renew a prescription or communicate with your care team.     To sign up for neoSurgicalt visit the website at [email or web address]   You will be asked to enter the access code listed below, as well as some personal information. Please follow the directions to create your username and password.     Your  "access code is: [email or web address]  Expires: 2018  6:30 AM     Your access code will  in 90 days. If you need help or a new code, please contact your [facility] or call [phone] for assistance.        Care EveryWhere ID     This is your Care EveryWhere ID. This could be used by other organizations to access your [place] medical records  [id number]        Your Vitals Were     Pulse Head Circumference BMI (Body Mass Index)             82 176.4 cm (69.45\") 23.89 kg/m2          Blood Pressure from Last 3 Encounters:   18 119/82   17 123/72   17 117/77    Weight from Last 3 Encounters:   18 75.5 kg (166 lb 8 oz)   17 77.9 kg (171 lb 11.2 oz)   17 77.7 kg (171 lb 3.2 oz)              We Performed the Following     Hemoglobin A1c POCT        Primary Care Provider Fax #    Physician No Ref-Primary [phone]       No address on file        Equal Access to Services     [facility]: Hadii ibis Graham, waaxda luraheemadaha, qaybta kaalmamaricarmen latham, dayana tang . So [facility] [phone].    ATENCIÓN: Si habla español, tiene a [place] disposición servicios gratuitos de asistencia lingüística. Llame al [phone].    We comply with applicable federal civil rights laws and [place] laws. We do not discriminate on the basis of race, color, national origin, age, disability, sex, sexual orientation, or gender identity.            Thank you!     Thank you for choosing [facility] ENDOCRINOLOGY  for your care. Our goal is always to provide you with excellent care. Hearing back from our patients is one way we can continue to improve our services. Please take a few minutes to complete the written survey that you may receive in the mail after your visit with us. Thank you!             Your Updated Medication List - Protect others around you: Learn how to safely use, store and throw away your medicines at www.disposemymeds.org. "          This list is accurate as of 3/9/18  4:58 PM.  Always use your most recent med list.                   Brand Name Dispense Instructions for use Diagnosis    ACCU-CHEK MACARIO PLUS test strip   Generic drug:  blood glucose monitoring     550 each    USE TO TEST BLOOD SUGARS 6 TIMES DAILY OR AS DIRECTED.    Diabetes mellitus type 1 (H)       acetone (Urine) test Strp     50 each    Use 1 strip to check urine ketones in the event of unexplained high blood glucose, or in the case of illness.90 day supply 50 strips    Type 1 diabetes mellitus without complication (H)       blood glucose monitoring meter device kit     1 kit    Use to test blood sugar 6 times daily or as directed.    Diabetes mellitus type 1 (H)       glucagon 1 MG kit     1 mg    Inject 1 mg into the muscle once for 1 dose    Diabetes mellitus type 1 (H)       * insulin lispro 100 UNIT/ML Cartridge    HUMALOG    30 mL    Use in pump aprox 30 units daily    Diabetes mellitus type 1 (H)       * insulin lispro 100 UNIT/ML injection    humaLOG    3 vial    Use to fill Tandem T-Slim insulin pump. Uses approximately 30 units daily    Diabetes mellitus type 1 (H)       insulin pen needle 30G X 5 MM     120 each    Use 4 pen needles daily or as directed.    Diabetes mellitus type 1 (H)       * Notice:  This list has 2 medication(s) that are the same as other medications prescribed for you. Read the directions carefully, and ask your doctor or other care provider to review them with you.

## 2018-03-09 NOTE — LETTER
3/9/2018       RE: Mallory Barajas  [address]  [place] [place] [address]     Dear Colleague,    Thank you for referring your patient, Mallory Barajas, to the [facility] ENDOCRINOLOGY at [facility]. Please see a copy of my visit note below.    HPI  Mallory Barajas is a 27 year old female with type 1 diabetes mellitus here today for a follow up visit.  Pt was dx having type 1 diabetes mellitus in May 2015.   Mallory is currently taking using a Tandem  insulin pump.  Her basal insulin rate is set at 0.15 units/hr x 24 hrs and I/C ratio is 1:15, sensitivity 50.  Her 24 hr basal insulin dose is low at 3.6 units/24 hrs.  Mallory's A1C is 6.5  % today.  No frequent hypoglycemia.  She had one low blood sugar 68 over the past month.  Her average glucose was 145 with SD 54 over the past month.  She remains very active.  She has a well balanced diet.  On ROS today, doing very well. Very active as above.  She denies frequent headaches, blurred vision,n/v, SOB at rest or cough.   No chest pain, abd pain, diarrhea, dysuria or hematuria.  She denies numbness or tingling in her feet or hands.    ROS  Please see under HPI.    Allergies  No Known Allergies    Medications  Current Outpatient Prescriptions   Medication Sig Dispense Refill     ACCU-CHEK MACARIO PLUS test strip USE TO TEST BLOOD SUGARS 6 TIMES DAILY OR AS DIRECTED. 550 each 3     insulin lispro (HUMALOG) 100 UNIT/ML injection Use to fill Tandem T-Slim insulin pump. Uses approximately 30 units daily 3 vial 3     acetone, Urine, test STRP Use 1 strip to check urine ketones in the event of unexplained high blood glucose, or in the case of illness.90 day supply 50 strips 50 each 3     insulin lispro (HUMALOG) 100 UNIT/ML Cartridge Use in pump aprox 30 units daily 30 mL 3     blood glucose monitoring (ACCU-CHEK MACARIO PLUS) meter device kit Use to test blood sugar 6 times daily or as directed. 1 kit 0     insulin pen needle 30G X 5 MM Use 4 pen needles daily  "or as directed. 120 each 12     glucagon 1 MG injection Inject 1 mg into the muscle once for 1 dose 1 mg 0     Family History  Mother with hx of type 2 diabetes.  First cousin with hx of type 1 diabetes.    Social History  Smoke: none.  ETOH: rare.  Single and no children.  Occupation:  ( 6 th grade ).   Exercise: yes; she is very athletic.    Past Medical History  Past Medical History:   Diagnosis Date     NO ACTIVE PROBLEMS (aka NONE)      Past Surgical History:   Procedure Laterality Date     ARTHROSCOPY KNEE BILATERAL         Physical Exam  /82  Pulse 82  Wt 75.5 kg (166 lb 8 oz)  .4 cm (69.45\")  BMI 23.89 kg/m2  Body mass index is 23.89 kg/(m^2).    GENERAL : In no apparent distress  SKIN: Normal.  EYES: PERRLA.   RESP: Lungs clear to auscultation.  CARDIAC: RRR.  ABDOMEN: Normal.        NEURO: awake, alert, responds appropriately to questions.    Moves all extremities; Gait normal.    EXTREMITIES: no edema.  FEET:  No ulcers. Normal monofilamentous exam.    RESULTS  Creatinine   Date Value Ref Range Status   11/23/2016 0.88 0.52 - 1.04 mg/dL Final     GFR Estimate   Date Value Ref Range Status   11/23/2016 77 >60 mL/min/1.7m2 Final     Comment:     Non  GFR Calc     Hemoglobin A1C   Date Value Ref Range Status   05/28/2015 14.0 (H) 4.3 - 6.0 % Final     Potassium   Date Value Ref Range Status   05/29/2015 3.5 3.4 - 5.3 mmol/L Final     ALT   Date Value Ref Range Status   11/23/2016 12 0 - 50 U/L Final     AST   Date Value Ref Range Status   05/29/2015 8 0 - 45 U/L Final     TSH   Date Value Ref Range Status   11/23/2016 1.05 0.40 - 4.00 mU/L Final     T4 Free   Date Value Ref Range Status   11/23/2016 1.05 0.76 - 1.46 ng/dL Final     A1C      6.5    3/9/2018  A1C      6.3    7/14/2017  A1C      6.2    3/6/2017  A1C      5.7    11/23/2016  A1C      6.4    8/22/2016  A1C      6.5    1/13/2016  A1C      7.3    8/20/2015  A1C      7.2    8/7/2015.  A1C     14.0   " 5/28/2015    ASSESSMENT/PLAN:    1. TYPE 1 DIABETES MELLITUS: Mallory continues to do a great job with her diabetes care and her blood sugar values look good at this time.  No frequent hypoglycemia.  No change in insulin rates today.  Pt remains normotensive.  Feet are ok.  She had her eyes examined done in July 2018 while in [place]- no evidence of retinopathy.  Pt's urine microalbuminuria negative in Nov 2016 with a normal creat/GFR.  Pt's TSH was normal.  Her LDL was 86 in Nov 2016.  I placed an order for patient to have annual diabetes labs done fasting.    2.  Return to Endocrine Clinic to see Dr. Cantrell in July 2018.      Sincerely,    Sara Larry PA-C

## 2018-03-09 NOTE — NURSING NOTE
"Chief Complaint   Patient presents with     RECHECK     Diabetes Type 1       Initial There were no vitals taken for this visit. Estimated body mass index is 24.64 kg/(m^2) as calculated from the following:    Height as of 11/14/17: 1.778 m (5' 10\").    Weight as of 11/14/17: 77.9 kg (171 lb 11.2 oz).  Medication Reconciliation: complete    "

## 2018-03-09 NOTE — PROGRESS NOTES
HPI  Mallory Barajas is a 27 year old female with type 1 diabetes mellitus here today for a follow up visit.  Pt was dx having type 1 diabetes mellitus in May 2015.   Mallory is currently taking using a Tandem  insulin pump.  Her basal insulin rate is set at 0.15 units/hr x 24 hrs and I/C ratio is 1:15, sensitivity 50.  Her 24 hr basal insulin dose is low at 3.6 units/24 hrs.  Mallory's A1C is 6.5  % today.  No frequent hypoglycemia.  She had one low blood sugar 68 over the past month.  Her average glucose was 145 with SD 54 over the past month.  She remains very active.  She has a well balanced diet.  On ROS today, doing very well. Very active as above.  She denies frequent headaches, blurred vision,n/v, SOB at rest or cough.   No chest pain, abd pain, diarrhea, dysuria or hematuria.  She denies numbness or tingling in her feet or hands.    ROS  Please see under HPI.    Allergies  No Known Allergies    Medications  Current Outpatient Prescriptions   Medication Sig Dispense Refill     ACCU-CHEK MACARIO PLUS test strip USE TO TEST BLOOD SUGARS 6 TIMES DAILY OR AS DIRECTED. 550 each 3     insulin lispro (HUMALOG) 100 UNIT/ML injection Use to fill Tandem T-Slim insulin pump. Uses approximately 30 units daily 3 vial 3     acetone, Urine, test STRP Use 1 strip to check urine ketones in the event of unexplained high blood glucose, or in the case of illness.90 day supply 50 strips 50 each 3     insulin lispro (HUMALOG) 100 UNIT/ML Cartridge Use in pump aprox 30 units daily 30 mL 3     blood glucose monitoring (ACCU-CHEK MACARIO PLUS) meter device kit Use to test blood sugar 6 times daily or as directed. 1 kit 0     insulin pen needle 30G X 5 MM Use 4 pen needles daily or as directed. 120 each 12     glucagon 1 MG injection Inject 1 mg into the muscle once for 1 dose 1 mg 0     Family History  Mother with hx of type 2 diabetes.  First cousin with hx of type 1 diabetes.    Social History  Smoke: none.  ETOH: rare.  Single and no  "children.  Occupation:  ( 6 th grade ).   Exercise: yes; she is very athletic.    Past Medical History  Past Medical History:   Diagnosis Date     NO ACTIVE PROBLEMS (aka NONE)      Past Surgical History:   Procedure Laterality Date     ARTHROSCOPY KNEE BILATERAL         Physical Exam  /82  Pulse 82  Wt 75.5 kg (166 lb 8 oz)  .4 cm (69.45\")  BMI 23.89 kg/m2  Body mass index is 23.89 kg/(m^2).    GENERAL : In no apparent distress  SKIN: Normal.  EYES: PERRLA.   RESP: Lungs clear to auscultation.  CARDIAC: RRR.  ABDOMEN: Normal.        NEURO: awake, alert, responds appropriately to questions.    Moves all extremities; Gait normal.    EXTREMITIES: no edema.  FEET:  No ulcers. Normal monofilamentous exam.    RESULTS  Creatinine   Date Value Ref Range Status   11/23/2016 0.88 0.52 - 1.04 mg/dL Final     GFR Estimate   Date Value Ref Range Status   11/23/2016 77 >60 mL/min/1.7m2 Final     Comment:     Non  GFR Calc     Hemoglobin A1C   Date Value Ref Range Status   05/28/2015 14.0 (H) 4.3 - 6.0 % Final     Potassium   Date Value Ref Range Status   05/29/2015 3.5 3.4 - 5.3 mmol/L Final     ALT   Date Value Ref Range Status   11/23/2016 12 0 - 50 U/L Final     AST   Date Value Ref Range Status   05/29/2015 8 0 - 45 U/L Final     TSH   Date Value Ref Range Status   11/23/2016 1.05 0.40 - 4.00 mU/L Final     T4 Free   Date Value Ref Range Status   11/23/2016 1.05 0.76 - 1.46 ng/dL Final     A1C      6.5    3/9/2018  A1C      6.3    7/14/2017  A1C      6.2    3/6/2017  A1C      5.7    11/23/2016  A1C      6.4    8/22/2016  A1C      6.5    1/13/2016  A1C      7.3    8/20/2015  A1C      7.2    8/7/2015.  A1C     14.0   5/28/2015    ASSESSMENT/PLAN:    1. TYPE 1 DIABETES MELLITUS: Mallory continues to do a great job with her diabetes care and her blood sugar values look good at this time.  No frequent hypoglycemia.  No change in insulin rates today.  Pt remains normotensive.  Feet are " ok.  She had her eyes examined done in July 2018 while in [place]- no evidence of retinopathy.  Pt's urine microalbuminuria negative in Nov 2016 with a normal creat/GFR.  Pt's TSH was normal.  Her LDL was 86 in Nov 2016.  I placed an order for patient to have annual diabetes labs done fasting.    2.  Return to Endocrine Clinic to see Dr. Cantrell in July 2018.

## 2018-07-31 ENCOUNTER — OFFICE VISIT (OUTPATIENT)
Dept: ENDOCRINOLOGY | Facility: CLINIC | Age: 28
End: 2018-07-31
Payer: COMMERCIAL

## 2018-07-31 VITALS
SYSTOLIC BLOOD PRESSURE: 112 MMHG | WEIGHT: 173.1 LBS | HEIGHT: 70 IN | DIASTOLIC BLOOD PRESSURE: 75 MMHG | HEART RATE: 87 BPM | BODY MASS INDEX: 24.78 KG/M2

## 2018-07-31 DIAGNOSIS — E10.9 TYPE 1 DIABETES MELLITUS WITHOUT COMPLICATION (H): Primary | ICD-10-CM

## 2018-07-31 LAB — HBA1C MFR BLD: 6.5 % (ref 4.3–6)

## 2018-07-31 ASSESSMENT — PAIN SCALES - GENERAL: PAINLEVEL: NO PAIN (0)

## 2018-07-31 NOTE — PROGRESS NOTES
"Ms. Barajas is a 28 year-old here to follow-up for type 1 diabetes which was diagnosed in 2015.     She is currently using a tandem pump with the following settings:     Basal - 0.15 mid  IC - 1:15  Sensitivity - 1:50  Target - mid 110    She does not have all her CGM readings today since she has two monitors. Reviewing available pump data shows an average BG of 179. She is above target 54% of the time, and her insulin is delivered as basal 16% of the time.    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 She boluses for all her meals as indicated by the pump, and corrects as needed. She plays in a basketball and soccer league and is very active most days. She usually notices BGs ~150 prior to exercise, and removes her pump for the hour that she plays. She has not noticed any lows during exercise or in the hours after. She notices \"higher than expected BGs\" after exercise, but no higher than 100.     She has optho care in [place] with her uncle. She has no other concerns today.     Review of Symptoms  Skin: No rashes, itchiness, redness.   Eyes: No change in vision, redness, itchiness.   ENT: No tinnitus, change in hearing, post-nasal drip, difficulty swallowing.  CV: No exertional chest pain, palpitations.  Resp: No dyspnea, cough.   GI: No abdominal pain, n/v, change in bowel movemenst.  : No urgency, frequency, hesitancy.  MSK: Long-standing knee pain (three knee surgeries on right, one on left) secondary to playing contact sports. No other muscle or joint pain.   Neuro: No numbness, tingling, or loss of sensation in feet or hands.    PAST MEDICAL HISTORY:   Past Medical History:   Diagnosis Date     NO ACTIVE PROBLEMS (aka NONE)        PAST SURGICAL HISTORY:   Past Surgical History:   Procedure Laterality Date     ARTHROSCOPY KNEE BILATERAL         FAMILY HISTORY: History reviewed. No pertinent family history.    SOCIAL HISTORY:   Social History   Substance Use Topics     Smoking status: " "Never Smoker     Smokeless tobacco: Never Used     Alcohol use Not on file     Current Outpatient Prescriptions   Medication     ACCU-CHEK MACRAIO PLUS test strip     acetone, Urine, test STRP     blood glucose monitoring (ACCU-CHEK MACARIO PLUS) meter device kit     glucagon 1 MG injection     insulin lispro (HUMALOG) 100 UNIT/ML Cartridge     insulin lispro (HUMALOG) 100 UNIT/ML injection     insulin pen needle 30G X 5 MM     No current facility-administered medications for this visit.      Physical Exam  Vital signs:      BP: 112/75 Pulse: 87           Height: 177.8 cm (5' 10\") Weight: 78.5 kg (173 lb 1.6 oz)  Estimated body mass index is 24.84 kg/(m^2) as calculated from the following:    Height as of this encounter: 1.778 m (5' 10\").    Weight as of this encounter: 78.5 kg (173 lb 1.6 oz).      Gen: NAD, pleasant, answers questions appropriately.  Eyes: No periorbital edema, scleral icterus, conjunctival injection. Extraocular movements intact grossly.   Feet: No edema, ulcers. Pulses present bilaterally. Feet intact to monofilament.    Recent Labs   Lab Test 07/31/18 03/09/18 11/23/16   0939  11/23/16   0931  08/22/16   1621   05/29/15   1738  05/28/15   2129   A1C   --    --    --    --    --    --    --    --   14.0*   HEMOGLOBINA1  6.5*  6.10*   < >   --    --    --    < >   --    --    TSH   --    --    --    --   1.05   --    --    --    --    T4   --    --    --    --   1.05   --    --    --    --    LDL   --    --    --    --   86   --    --    --    --    HDL   --    --    --    --   76   --    --    --    --    TRIG   --    --    --    --   40   --    --    --    --    CR   --    --    --    --   0.88   --    --   0.71  0.64   MICROL   --    --    --   8   --   6   --    --    --     < > = values in this interval not displayed.   A1c today was 6.5    Assessment/Plan  1. Diabetes: Ms. Barajas continues to have well-controlled diabetes. Her basal insulin accounts for 16% of total insulin administered, but " given that her sugars are well controlled she will continue her current regimen.     2. Diabetes complications: She was diagnosed in 2015, and therefore does not need screening until 5 years after diagnosis.     3. CV risk: Minimal at present, will monitor in future as needed.    RTC 6 months to see Carissa Millard MS3, saw this patient in the presence of Dr. Josiane Cantrell MD.

## 2018-07-31 NOTE — LETTER
"7/31/2018       RE: Mallory Barajas  [address]  [facility] [address]     Dear Colleague,    Thank you for referring your patient, Mallory Barajas, to the [facility] ENDOCRINOLOGY at [facility]. Please see a copy of my visit note below.    Ms. Barajas is a 28 year-old here to follow-up for type 1 diabetes which was diagnosed in 2015.     She is currently using a tandem pump with the following settings:     Basal - 0.15 mid  IC - 1:15  Sensitivity - 1:50  Target - mid 110    She does not have all her CGM readings today since she has two monitors. Reviewing available pump data shows an average BG of 179. She is above target 54% of the time, and her insulin is delivered as basal 16% of the time.    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 She boluses for all her meals as indicated by the pump, and corrects as needed. She plays in a basketball and soccer league and is very active most days. She usually notices BGs ~150 prior to exercise, and removes her pump for the hour that she plays. She has not noticed any lows during exercise or in the hours after. She notices \"higher than expected BGs\" after exercise, but no higher than 100.     She has optho care in [place] with her uncle. She has no other concerns today.     Review of Symptoms  Skin: No rashes, itchiness, redness.   Eyes: No change in vision, redness, itchiness.   ENT: No tinnitus, change in hearing, post-nasal drip, difficulty swallowing.  CV: No exertional chest pain, palpitations.  Resp: No dyspnea, cough.   GI: No abdominal pain, n/v, change in bowel movemenst.  : No urgency, frequency, hesitancy.  MSK: Long-standing knee pain (three knee surgeries on right, one on left) secondary to playing contact sports. No other muscle or joint pain.   Neuro: No numbness, tingling, or loss of sensation in feet or hands.    PAST MEDICAL HISTORY:   Past Medical History:   Diagnosis Date     NO ACTIVE PROBLEMS " "(aka NONE)        PAST SURGICAL HISTORY:   Past Surgical History:   Procedure Laterality Date     ARTHROSCOPY KNEE BILATERAL         FAMILY HISTORY: History reviewed. No pertinent family history.    SOCIAL HISTORY:   Social History   Substance Use Topics     Smoking status: Never Smoker     Smokeless tobacco: Never Used     Alcohol use Not on file     Current Outpatient Prescriptions   Medication     ACCU-CHEK MACARIO PLUS test strip     acetone, Urine, test STRP     blood glucose monitoring (ACCU-CHEK MACARIO PLUS) meter device kit     glucagon 1 MG injection     insulin lispro (HUMALOG) 100 UNIT/ML Cartridge     insulin lispro (HUMALOG) 100 UNIT/ML injection     insulin pen needle 30G X 5 MM     No current facility-administered medications for this visit.      Physical Exam  Vital signs:      BP: 112/75 Pulse: 87           Height: 177.8 cm (5' 10\") Weight: 78.5 kg (173 lb 1.6 oz)  Estimated body mass index is 24.84 kg/(m^2) as calculated from the following:    Height as of this encounter: 1.778 m (5' 10\").    Weight as of this encounter: 78.5 kg (173 lb 1.6 oz).      Gen: NAD, pleasant, answers questions appropriately.  Eyes: No periorbital edema, scleral icterus, conjunctival injection. Extraocular movements intact grossly.   Feet: No edema, ulcers. Pulses present bilaterally. Feet intact to monofilament.    Recent Labs   Lab Test 07/31/18 03/09/18 11/23/16   0939  11/23/16   0931  08/22/16   1621   05/29/15   1738  05/28/15   2129   A1C   --    --    --    --    --    --    --    --   14.0*   HEMOGLOBINA1  6.5*  6.10*   < >   --    --    --    < >   --    --    TSH   --    --    --    --   1.05   --    --    --    --    T4   --    --    --    --   1.05   --    --    --    --    LDL   --    --    --    --   86   --    --    --    --    HDL   --    --    --    --   76   --    --    --    --    TRIG   --    --    --    --   40   --    --    --    --    CR   --    --    --    --   0.88   --    --   0.71  0.64   MICROL "   --    --    --   8   --   6   --    --    --     < > = values in this interval not displayed.   A1c today was 6.5    Assessment/Plan  1. Diabetes: Ms. Barajas continues to have well-controlled diabetes. Her basal insulin accounts for 16% of total insulin administered, but given that her sugars are well controlled she will continue her current regimen.     2. Diabetes complications: She was diagnosed in 2015, and therefore does not need screening until 5 years after diagnosis.     3. CV risk: Minimal at present, will monitor in future as needed.    RTC 6 months to see Carissa Millard MS3, saw this patient in the presence of Dr. Josiane Cantrell MD.      This 28-year-old woman he was diagnosed with type 1 diabetes in 2015 returns for follow-up.  She was seen and examined by me with medical student Carissa Mello.  Please see student's note of the same date for full details.    In brief she is doing very well.      Current Outpatient Prescriptions on File Prior to Visit:  ACCU-CHEK MACARIO PLUS test strip USE TO TEST BLOOD SUGARS 6 TIMES DAILY OR AS DIRECTED.   acetone, Urine, test STRP Use 1 strip to check urine ketones in the event of unexplained high blood glucose, or in the case of illness.90 day supply 50 strips   blood glucose monitoring (ACCU-CHEK MACARIO PLUS) meter device kit Use to test blood sugar 6 times daily or as directed.   glucagon 1 MG injection Inject 1 mg into the muscle once for 1 dose   insulin lispro (HUMALOG) 100 UNIT/ML Cartridge Use in pump aprox 30 units daily   insulin lispro (HUMALOG) 100 UNIT/ML injection Use to fill Tandem T-Slim insulin pump. Uses approximately 30 units daily   insulin pen needle 30G X 5 MM Use 4 pen needles daily or as directed.     No current facility-administered medications on file prior to visit.     ROS: 10 point ROS neg other than the symptoms noted above in the HPI.    So Hx - very active athlete    Vital signs:   /75  Pulse 87  Ht 1.778 m  "(5' 10\")  Wt 78.5 kg (173 lb 1.6 oz)  BMI 24.84 kg/m2  Estimated body mass index is 24.84 kg/(m^2) as calculated from the following:    Height as of this encounter: 1.778 m (5' 10\").    Weight as of this encounter: 78.5 kg (173 lb 1.6 oz).    VSS  NAD  Eyes - no periorbital edema, conjunctival injection, scleral icterus  CV -  No edema  Skin - normal texture   Feet - no ulcers     Recent Labs   Lab Test 07/31/18 03/09/18 11/23/16   0939  11/23/16   0931  08/22/16   1621   05/29/15   1738  05/28/15   2129   A1C   --    --    --    --    --    --    --    --   14.0*   HEMOGLOBINA1  6.5*  6.10*   < >   --    --    --    < >   --    --    TSH   --    --    --    --   1.05   --    --    --    --    T4   --    --    --    --   1.05   --    --    --    --    LDL   --    --    --    --   86   --    --    --    --    HDL   --    --    --    --   76   --    --    --    --    TRIG   --    --    --    --   40   --    --    --    --    CR   --    --    --    --   0.88   --    --   0.71  0.64   MICROL   --    --    --   8   --   6   --    --    --     < > = values in this interval not displayed.       Assessment and plan:    1.  Diabetes control.  She is doing very well.  Her A1c is at target.  She is not having hypoglycemia.  We did not make any changes in her pump settings today.      2.  Diabetes complications.  She is only had diabetes for a couple of years so it is too early to check for complications.    F/u 6 mo with PA and 12 mo with me    I spent 15 minutes with this patient face to face and explained the conditions and plans (more than 50% of time was counseling/coordination of diabetes care with respect to exercise.) . The patient understood and is satisfied with today's visit.       Josiane Cantrell MD          "

## 2018-07-31 NOTE — PROGRESS NOTES
"This 28-year-old woman he was diagnosed with type 1 diabetes in 2015 returns for follow-up.  She was seen and examined by me with medical student Carissa Mello.  Please see student's note of the same date for full details.    In brief she is doing very well.      Current Outpatient Prescriptions on File Prior to Visit:  ACCU-CHEK MACARIO PLUS test strip USE TO TEST BLOOD SUGARS 6 TIMES DAILY OR AS DIRECTED.   acetone, Urine, test STRP Use 1 strip to check urine ketones in the event of unexplained high blood glucose, or in the case of illness.90 day supply 50 strips   blood glucose monitoring (ACCU-CHEK MACARIO PLUS) meter device kit Use to test blood sugar 6 times daily or as directed.   glucagon 1 MG injection Inject 1 mg into the muscle once for 1 dose   insulin lispro (HUMALOG) 100 UNIT/ML Cartridge Use in pump aprox 30 units daily   insulin lispro (HUMALOG) 100 UNIT/ML injection Use to fill Tandem T-Slim insulin pump. Uses approximately 30 units daily   insulin pen needle 30G X 5 MM Use 4 pen needles daily or as directed.     No current facility-administered medications on file prior to visit.     ROS: 10 point ROS neg other than the symptoms noted above in the HPI.    So Hx - very active athlete    Vital signs:   /75  Pulse 87  Ht 1.778 m (5' 10\")  Wt 78.5 kg (173 lb 1.6 oz)  BMI 24.84 kg/m2  Estimated body mass index is 24.84 kg/(m^2) as calculated from the following:    Height as of this encounter: 1.778 m (5' 10\").    Weight as of this encounter: 78.5 kg (173 lb 1.6 oz).    VSS  NAD  Eyes - no periorbital edema, conjunctival injection, scleral icterus  CV -  No edema  Skin - normal texture   Feet - no ulcers     Recent Labs   Lab Test 07/31/18 03/09/18 11/23/16   0939  11/23/16   0931  08/22/16   1621   05/29/15   1738  05/28/15   2125   A1C   --    --    --    --    --    --    --    --   14.0*   HEMOGLOBINA1  6.5*  6.10*   < >   --    --    --    < >   --    --    TSH   --    --    --    --   1.05 "   --    --    --    --    T4   --    --    --    --   1.05   --    --    --    --    LDL   --    --    --    --   86   --    --    --    --    HDL   --    --    --    --   76   --    --    --    --    TRIG   --    --    --    --   40   --    --    --    --    CR   --    --    --    --   0.88   --    --   0.71  0.64   MICROL   --    --    --   8   --   6   --    --    --     < > = values in this interval not displayed.       Assessment and plan:    1.  Diabetes control.  She is doing very well.  Her A1c is at target.  She is not having hypoglycemia.  We did not make any changes in her pump settings today.      2.  Diabetes complications.  She is only had diabetes for a couple of years so it is too early to check for complications.    F/u 6 mo with PA and 12 mo with me    I spent 15 minutes with this patient face to face and explained the conditions and plans (more than 50% of time was counseling/coordination of diabetes care with respect to exercise.) . The patient understood and is satisfied with today's visit.       Josiane Cantrell MD

## 2018-07-31 NOTE — MR AVS SNAPSHOT
After Visit Summary   2018    Mallory Barajas    MRN: [id number]           Patient Information     Date Of Birth          1990        Visit Information        Provider Department      2018 3:30 PM Josiane Cantrell MD [facility] End[facility]        Today's Diagnoses     Type 1 diabetes mellitus without complication (H)    -  1       Follow-ups after your visit        Your next 10 appointments already scheduled     2019  4:00 PM CST   (Arrive by 3:45 PM)   RETURN DIABETES with [facility] Endocrinology ([facility])    [address] [place] [id number] [phone]              Who to contact     Please call your clinic at [phone] to:    Ask questions about your health    Make or cancel appointments    Discuss your medicines    Learn about your test results    Speak to your doctor            Additional Information About Your Visit        MyChart Information     [employer] is an electronic gateway that provides easy, online access to your medical records. With [employer], you can request a clinic appointment, read your test results, renew a prescription or communicate with your care team.     To sign up for [place] [facility] visit the website at [email or web address]   You will be asked to enter the access code listed below, as well as some personal information. Please follow the directions to create your username and password.     Your access code is: [email or web address]  Expires: 10/15/2018  6:30 AM     Your access code will  in 90 days. If you need help or a new code, please contact your [facility] or call [phone] for assistance.        Care EveryWhere ID     This is your Care EveryWhere ID. This could be used by other organizations to access your [facility] medical records  [id number]        Your Vitals Were     Pulse Height BMI (Body Mass Index)             87 1.778 m (5'  "10\") 24.84 kg/m2          Blood Pressure from Last 3 Encounters:   07/31/18 112/75   03/09/18 119/82   11/14/17 123/72    Weight from Last 3 Encounters:   07/31/18 78.5 kg (173 lb 1.6 oz)   03/09/18 75.5 kg (166 lb 8 oz)   11/14/17 77.9 kg (171 lb 11.2 oz)              We Performed the Following     Hemoglobin A1c POCT        Primary Care Provider Fax #    Physician No Ref-Primary [phone]       No address on file        Equal Access to Services     [facility]: Hadii ibis price Sosalo, waaxda luqadaha, qaybgreer kaalmamaricarmen latham, dayana tang . So [facility] [phone].    ATENCIÓN: Si habla español, tiene a [place] disposición servicios gratuitos de asistencia lingüística. Ll[facility] [phone].    We comply with applicable federal civil rights laws and [place] laws. We do not discriminate on the basis of race, color, national origin, age, disability, sex, sexual orientation, or gender identity.            Thank you!     Thank you for choosing [facility] ENDOCRINOLOGY  for your care. Our goal is always to provide you with excellent care. Hearing back from our patients is one way we can continue to improve our services. Please take a few minutes to complete the written survey that you may receive in the mail after your visit with us. Thank you!             Your Updated Medication List - Protect others around you: Learn how to safely use, store and throw away your medicines at www.disposemymeds.org.          This list is accurate as of 7/31/18  5:43 PM.  Always use your most recent med list.                   Brand Name Dispense Instructions for use Diagnosis    ACCU-CHEK MACARIO PLUS test strip   Generic drug:  blood glucose monitoring     550 each    USE TO TEST BLOOD SUGARS 6 TIMES DAILY OR AS DIRECTED.    Diabetes mellitus type 1 (H)       acetone (Urine) test Strp     50 each    Use 1 strip to check urine ketones in the event of unexplained high blood glucose, or in the case of " illness.90 day supply 50 strips    Type 1 diabetes mellitus without complication (H)       blood glucose monitoring meter device kit     1 kit    Use to test blood sugar 6 times daily or as directed.    Diabetes mellitus type 1 (H)       glucagon 1 MG kit     1 mg    Inject 1 mg into the muscle once for 1 dose    Diabetes mellitus type 1 (H)       * insulin lispro 100 UNIT/ML Cartridge    HUMALOG    30 mL    Use in pump aprox 30 units daily    Diabetes mellitus type 1 (H)       * insulin lispro 100 UNIT/ML injection    humaLOG    3 vial    Use to fill Tandem T-Slim insulin pump. Uses approximately 30 units daily    Diabetes mellitus type 1 (H)       insulin pen needle 30G X 5 MM     120 each    Use 4 pen needles daily or as directed.    Diabetes mellitus type 1 (H)       * Notice:  This list has 2 medication(s) that are the same as other medications prescribed for you. Read the directions carefully, and ask your doctor or other care provider to review them with you.

## 2018-08-14 DIAGNOSIS — E10.9 DIABETES MELLITUS TYPE 1 (H): ICD-10-CM

## 2018-08-15 RX ORDER — INSULIN LISPRO 100 [IU]/ML
INJECTION, SOLUTION INTRAVENOUS; SUBCUTANEOUS
Qty: 30 ML | Refills: 3 | Status: SHIPPED | OUTPATIENT
Start: 2018-08-15 | End: 2019-08-20

## 2018-08-16 ENCOUNTER — TELEPHONE (OUTPATIENT)
Dept: ENDOCRINOLOGY | Facility: CLINIC | Age: 28
End: 2018-08-16

## 2018-08-16 NOTE — TELEPHONE ENCOUNTER
Pump failure needing  Long acting insulin pen sent to  [facility] lunch  123= dinner  108= HS 87  [facility]  Along with pen needles.  Basal dose 3.6 running at 0.15 units per 24 hours BS  8/16/18  AM  124  8/15/18  AM 99 = post breakfast  136=    lunch  123= dinner  108= HS 87

## 2018-08-16 NOTE — TELEPHONE ENCOUNTER
[facility] Call Center    Phone Message    May a detailed message be left on voicemail: yes    Reason for Call: Other: Patient calling in requesting new insertion sets, the kind she was using no longer exists. [employer] supply says they have a replacement but just need new order. Patient also says she may need Rx for needles and long lasting insulin until she gets new insertion sets. Please call to discuss. Thank you.     Action Taken: Message routed to:  [facility] ([facility] Endocrinology

## 2018-08-16 NOTE — TELEPHONE ENCOUNTER
Needs Insertion Set     Was 23' 60cm, canula 9mm, 90 degree insertion, [facility]

## 2018-08-16 NOTE — TELEPHONE ENCOUNTER
[facility] Call Center    Phone Message    May a detailed message be left on voicemail: yes    Reason for Call: Other: Pt states that she got the supplies she needs and no longer needs any sent to [place]. Any questions, contact the pt.      Action Taken: Message routed to:  [facility] ([facility] Kai

## 2019-01-17 ENCOUNTER — OFFICE VISIT (OUTPATIENT)
Dept: ENDOCRINOLOGY | Facility: CLINIC | Age: 29
End: 2019-01-17
Payer: COMMERCIAL

## 2019-01-17 VITALS
DIASTOLIC BLOOD PRESSURE: 80 MMHG | HEIGHT: 70 IN | BODY MASS INDEX: 24.81 KG/M2 | SYSTOLIC BLOOD PRESSURE: 121 MMHG | WEIGHT: 173.28 LBS | HEART RATE: 70 BPM

## 2019-01-17 DIAGNOSIS — E10.9 TYPE 1 DIABETES MELLITUS WITHOUT COMPLICATION (H): Primary | ICD-10-CM

## 2019-01-17 ASSESSMENT — MIFFLIN-ST. JEOR: SCORE: 1596.25

## 2019-01-17 ASSESSMENT — PAIN SCALES - GENERAL: PAINLEVEL: NO PAIN (0)

## 2019-01-17 NOTE — LETTER
1/17/2019       RE: Mallory Barajas  [address]  [facility] [address]     Dear Colleague,    Thank you for referring your patient, Mallory Barajas, to the [facility] ENDOCRINOLOGY at [facility]. Please see a copy of my visit note below.    HPI  Mallory Barajas is a 28 year old female with type 1 diabetes mellitus here today for a follow up visit.  Pt was last seen in our clinic by  in July 2018 and was doing well at that time and she continues to do well.  Pt was dx having type 1 diabetes mellitus in May 2015.   Mallory is currently taking using a Tandem  insulin pump.  Her basal insulin rate is set at 0.15 units/hr x 24 hrs and I/C ratio is 1:15, sensitivity 50.  Her 24 hr basal insulin dose is low at 3.6 units/24 hrs.  She eats healthy and is very active.  Mallory's A1C is 6.9 % today.  Her previous A1C was 6.5 %.  We downloaded her insulin pump data today and her average glucose was 166 with SD 43 over the past month.  No frequent hypoglycemia.  On ROS today,pt is very active as above.  She denies frequent headaches, blurred vision, n/v, SOB at rest or cough.   No chest pain, abd pain, diarrhea, dysuria or hematuria.  She denies numbness or tingling in her feet or hands.  LMP was 1/11/2019.    ROS  Please see under HPI.    Allergies  No Known Allergies    Medications  Current Outpatient Medications   Medication Sig Dispense Refill     ACCU-CHEK MACARIO PLUS test strip USE TO TEST BLOOD SUGARS 6 TIMES DAILY OR AS DIRECTED. 550 each 3     acetone, Urine, test STRP Use 1 strip to check urine ketones in the event of unexplained high blood glucose, or in the case of illness.90 day supply 50 strips 50 each 3     blood glucose monitoring (ACCU-CHEK MACARIO PLUS) meter device kit Use to test blood sugar 6 times daily or as directed. 1 kit 0     HUMALOG 100 UNIT/ML soln USE TO FILL TANDEM T-SLIM INSULIN PUMP. USE APPROXIMATELY 30 UNITS DAILY 30 mL 3     insulin lispro (HUMALOG) 100 UNIT/ML  Cartridge Use in pump aprox 30 units daily 30 mL 3     insulin pen needle 30G X 5 MM Use 4 pen needles daily or as directed. 120 each 12     glucagon 1 MG injection Inject 1 mg into the muscle once for 1 dose 1 mg 0     Family History  Mother with hx of type 2 diabetes.  First cousin with hx of type 1 diabetes.    Social History  Smoke: none.  ETOH: rare.  Single and no children.  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    Past Medical History  Past Medical History:   Diagnosis Date     NO ACTIVE PROBLEMS (aka NONE)      Past Surgical History:   Procedure Laterality Date     ARTHROSCOPY KNEE BILATERAL         Physical Exam  There were no vitals taken for this visit.  There is no height or weight on file to calculate BMI.    GENERAL : In no apparent distress  SKIN: Normal.  EYES: PERRLA.   RESP: Lungs clear to auscultation.  CARDIAC: RRR.  ABDOMEN: Normal.        NEURO: awake, alert, responds appropriately to questions.    Moves all extremities; Gait normal.    EXTREMITIES: no edema.  FEET:  No ulcers. Normal monofilamentous exam.    RESULTS  Creatinine   Date Value Ref Range Status   11/23/2016 0.88 0.52 - 1.04 mg/dL Final     GFR Estimate   Date Value Ref Range Status   11/23/2016 77 >60 mL/min/1.7m2 Final     Comment:     Non  GFR Calc     Hemoglobin A1C   Date Value Ref Range Status   05/28/2015 14.0 (H) 4.3 - 6.0 % Final     Potassium   Date Value Ref Range Status   05/29/2015 3.5 3.4 - 5.3 mmol/L Final     ALT   Date Value Ref Range Status   11/23/2016 12 0 - 50 U/L Final     AST   Date Value Ref Range Status   05/29/2015 8 0 - 45 U/L Final     TSH   Date Value Ref Range Status   11/23/2016 1.05 0.40 - 4.00 mU/L Final     T4 Free   Date Value Ref Range Status   11/23/2016 1.05 0.76 - 1.46 ng/dL Final     A1C      6.9    1/17/2019  A1C      6.5    7/31/2018  A1C      6.5    3/9/2018  A1C      6.3    7/14/2017  A1C      6.2     3/6/2017  A1C      5.7    11/23/2016  A1C      6.4    8/22/2016  A1C      6.5    1/13/2016  A1C      7.3    8/20/2015  A1C      7.2    8/7/2015.  A1C     14.0   5/28/2015    ASSESSMENT/PLAN:    1. TYPE 1 DIABETES MELLITUS: Mallory continues to do a great job with her diabetes care and her blood sugar values remain good.  No change in insulin basal rates today.    Pt remains normotensive.  Feet are ok.  She had her eyes examined done in Dec 2018 while in [place]- no evidence of retinopathy.  Pt's urine microalbuminuria negative in Nov 2016 with a normal creat/GFR.  Pt's TSH was normal.  Her LDL was 86 in Nov 2016.  I placed an order for patient to have annual fasting diabetes labs done next week.  Flu vaccine given today.    2.  Return to Endocrine Clinic to see Dr. Cantrell in 6 months.      Sara Larry PA-C

## 2019-01-17 NOTE — PROGRESS NOTES
HPI  Mallory Barajas is a 28 year old female with type 1 diabetes mellitus here today for a follow up visit.  Pt was last seen in our clinic by  in July 2018 and was doing well at that time and she continues to do well.  Pt was dx having type 1 diabetes mellitus in May 2015.   Mallory is currently taking using a Tandem  insulin pump.  Her basal insulin rate is set at 0.15 units/hr x 24 hrs and I/C ratio is 1:15, sensitivity 50.  Her 24 hr basal insulin dose is low at 3.6 units/24 hrs.  She eats healthy and is very active.  Mallory's A1C is 6.9 % today.  Her previous A1C was 6.5 %.  We downloaded her insulin pump data today and her average glucose was 166 with SD 43 over the past month.  No frequent hypoglycemia.  On ROS today,pt is very active as above.  She denies frequent headaches, blurred vision, n/v, SOB at rest or cough.   No chest pain, abd pain, diarrhea, dysuria or hematuria.  She denies numbness or tingling in her feet or hands.  LMP was 1/11/2019.    ROS  Please see under HPI.    Allergies  No Known Allergies    Medications  Current Outpatient Medications   Medication Sig Dispense Refill     ACCU-CHEK MACARIO PLUS test strip USE TO TEST BLOOD SUGARS 6 TIMES DAILY OR AS DIRECTED. 550 each 3     acetone, Urine, test STRP Use 1 strip to check urine ketones in the event of unexplained high blood glucose, or in the case of illness.90 day supply 50 strips 50 each 3     blood glucose monitoring (ACCU-CHEK MACARIO PLUS) meter device kit Use to test blood sugar 6 times daily or as directed. 1 kit 0     HUMALOG 100 UNIT/ML soln USE TO FILL TANDEM T-SLIM INSULIN PUMP. USE APPROXIMATELY 30 UNITS DAILY 30 mL 3     insulin lispro (HUMALOG) 100 UNIT/ML Cartridge Use in pump aprox 30 units daily 30 mL 3     insulin pen needle 30G X 5 MM Use 4 pen needles daily or as directed. 120 each 12     glucagon 1 MG injection Inject 1 mg into the muscle once for 1 dose 1 mg 0     Family History  Mother with hx of type 2  diabetes.  First cousin with hx of type 1 diabetes.    Social History  Smoke: none.  ETOH: rare.  Single and no children.  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    Past Medical History  Past Medical History:   Diagnosis Date     NO ACTIVE PROBLEMS (aka NONE)      Past Surgical History:   Procedure Laterality Date     ARTHROSCOPY KNEE BILATERAL         Physical Exam  There were no vitals taken for this visit.  There is no height or weight on file to calculate BMI.    GENERAL : In no apparent distress  SKIN: Normal.  EYES: PERRLA.   RESP: Lungs clear to auscultation.  CARDIAC: RRR.  ABDOMEN: Normal.        NEURO: awake, alert, responds appropriately to questions.    Moves all extremities; Gait normal.    EXTREMITIES: no edema.  FEET:  No ulcers. Normal monofilamentous exam.    RESULTS  Creatinine   Date Value Ref Range Status   11/23/2016 0.88 0.52 - 1.04 mg/dL Final     GFR Estimate   Date Value Ref Range Status   11/23/2016 77 >60 mL/min/1.7m2 Final     Comment:     Non  GFR Calc     Hemoglobin A1C   Date Value Ref Range Status   05/28/2015 14.0 (H) 4.3 - 6.0 % Final     Potassium   Date Value Ref Range Status   05/29/2015 3.5 3.4 - 5.3 mmol/L Final     ALT   Date Value Ref Range Status   11/23/2016 12 0 - 50 U/L Final     AST   Date Value Ref Range Status   05/29/2015 8 0 - 45 U/L Final     TSH   Date Value Ref Range Status   11/23/2016 1.05 0.40 - 4.00 mU/L Final     T4 Free   Date Value Ref Range Status   11/23/2016 1.05 0.76 - 1.46 ng/dL Final     A1C      6.9    1/17/2019  A1C      6.5    7/31/2018  A1C      6.5    3/9/2018  A1C      6.3    7/14/2017  A1C      6.2    3/6/2017  A1C      5.7    11/23/2016  A1C      6.4    8/22/2016  A1C      6.5    1/13/2016  A1C      7.3    8/20/2015  A1C      7.2    8/7/2015.  A1C     14.0   5/28/2015    ASSESSMENT/PLAN:    1. TYPE 1 DIABETES MELLITUS: Mallory continues to do a great job with her  diabetes care and her blood sugar values remain good.  No change in insulin basal rates today.    Pt remains normotensive.  Feet are ok.  She had her eyes examined done in Dec 2018 while in [place]- no evidence of retinopathy.  Pt's urine microalbuminuria negative in Nov 2016 with a normal creat/GFR.  Pt's TSH was normal.  Her LDL was 86 in Nov 2016.  I placed an order for patient to have annual fasting diabetes labs done next week.  Flu vaccine given today.    2.  Return to Endocrine Clinic to see Dr. Cantrell in 6 months.

## 2019-01-24 DIAGNOSIS — E10.9 TYPE 1 DIABETES MELLITUS WITHOUT COMPLICATION (H): ICD-10-CM

## 2019-01-24 LAB
ALT SERPL W P-5'-P-CCNC: 13 U/L (ref 0–50)
AST SERPL W P-5'-P-CCNC: 10 U/L (ref 0–45)
CHOLEST SERPL-MCNC: 152 MG/DL
CREAT SERPL-MCNC: 0.85 MG/DL (ref 0.52–1.04)
CREAT UR-MCNC: 224 MG/DL
GFR SERPL CREATININE-BSD FRML MDRD: >90 ML/MIN/{1.73_M2}
HDLC SERPL-MCNC: 78 MG/DL
LDLC SERPL CALC-MCNC: 67 MG/DL
MICROALBUMIN UR-MCNC: 7 MG/L
MICROALBUMIN/CREAT UR: 3.06 MG/G CR (ref 0–25)
NONHDLC SERPL-MCNC: 74 MG/DL
TRIGL SERPL-MCNC: 36 MG/DL
TSH SERPL DL<=0.005 MIU/L-ACNC: 0.96 MU/L (ref 0.4–4)

## 2019-06-25 DIAGNOSIS — E10.9 DIABETES MELLITUS TYPE 1 (H): ICD-10-CM

## 2019-06-25 RX ORDER — BLOOD SUGAR DIAGNOSTIC
STRIP MISCELLANEOUS
Qty: 600 EACH | Refills: 3 | Status: SHIPPED | OUTPATIENT
Start: 2019-06-25 | End: 2020-06-08

## 2019-07-24 ENCOUNTER — DOCUMENTATION ONLY (OUTPATIENT)
Dept: CARE COORDINATION | Facility: CLINIC | Age: 29
End: 2019-07-24

## 2019-08-20 DIAGNOSIS — E10.9 DIABETES MELLITUS TYPE 1 (H): ICD-10-CM

## 2019-08-20 NOTE — TELEPHONE ENCOUNTER
HUMALOG 100 UNIT/ML soln      Last Written Prescription Date:  8-15-18  Last Fill Quantity: 30 ml,   # refills: 3  Last Office Visit : 8-7-19  Future Office visit:  2-3-20    Routing refill request to provider for review/approval because:  Insulin - refilled per clinic      Kathleen M Doege RN

## 2019-08-21 RX ORDER — INSULIN LISPRO 100 [IU]/ML
INJECTION, SOLUTION INTRAVENOUS; SUBCUTANEOUS
Qty: 30 ML | Refills: 3 | Status: SHIPPED | OUTPATIENT
Start: 2019-08-21 | End: 2020-06-08

## 2019-08-21 NOTE — TELEPHONE ENCOUNTER
Short Acting Insulin Protocol Failed8/20 5:56 PM   Recent (6 mo) or future (30 days) visit within the authorizing provider's specialty     Type 1  rtc appt in place 02/03/2020 with Dr Cantrell.  Lucy Christie, RN on 8/21/2019 at 6:33 AM

## 2019-08-22 ENCOUNTER — TELEPHONE (OUTPATIENT)
Dept: ENDOCRINOLOGY | Facility: CLINIC | Age: 29
End: 2019-08-22

## 2019-08-22 NOTE — TELEPHONE ENCOUNTER
[facility] Call Center    Phone Message    May a detailed message be left on voicemail: yes    Reason for Call: Other: At her last office visit patient was told to use her CGM more.  Unfortunately her transmitter .  Patient is needing a new one, but she was using the Dexcom G4 so she thinks she needs everything new.  She gets her medical supplies from [employer].  She would like a call back to discuss what she needs, if she needs to upgrade to the G6.  Please reach out to her at [phone].     Action Taken: Message routed to:  [facility] ([facility] Endocrinology

## 2019-08-22 NOTE — TELEPHONE ENCOUNTER
Called patient, no answer left vmail informing her to call [employer] to initiate process and they will send forms for provider to sign off on.

## 2019-08-29 NOTE — TELEPHONE ENCOUNTER
[facility] Call Center    Phone Message    May a detailed message be left on voicemail: yes    Reason for Call: Other: [employer] calling to discuss status of forms they faxed on 08/26/19. Please call to discuss.      Action Taken: Message routed to:  [facility] Kai

## 2019-09-04 ENCOUNTER — MEDICAL CORRESPONDENCE (OUTPATIENT)
Dept: HEALTH INFORMATION MANAGEMENT | Facility: CLINIC | Age: 29
End: 2019-09-04

## 2019-09-04 NOTE — TELEPHONE ENCOUNTER
[facility] Call Center    Phone Message    May a detailed message be left on voicemail: yes    Reason for Call: Form or Letter   Type or form/letter needing completion: Medical Supplies form from [employer]  Provider: Larry  Date form needed: Now  Once completed: Fax form to: [phone] to [employer]. Call [phone] with questions. Thanks.      Action Taken: Message routed to:  [facility] emilie lee

## 2020-02-03 ENCOUNTER — OFFICE VISIT (OUTPATIENT)
Dept: ENDOCRINOLOGY | Facility: CLINIC | Age: 30
End: 2020-02-03
Payer: COMMERCIAL

## 2020-02-03 VITALS
HEIGHT: 70 IN | HEART RATE: 66 BPM | WEIGHT: 159 LBS | BODY MASS INDEX: 22.76 KG/M2 | SYSTOLIC BLOOD PRESSURE: 119 MMHG | DIASTOLIC BLOOD PRESSURE: 76 MMHG

## 2020-02-03 DIAGNOSIS — E10.9 TYPE 1 DIABETES MELLITUS WITHOUT COMPLICATION (H): Primary | ICD-10-CM

## 2020-02-03 DIAGNOSIS — E10.9 TYPE 1 DIABETES MELLITUS WITHOUT COMPLICATION (H): ICD-10-CM

## 2020-02-03 LAB
CREAT SERPL-MCNC: 0.78 MG/DL (ref 0.52–1.04)
CREAT UR-MCNC: 51 MG/DL
GFR SERPL CREATININE-BSD FRML MDRD: >90 ML/MIN/{1.73_M2}
HBA1C MFR BLD: 7.3 % (ref 4.3–6)
MICROALBUMIN UR-MCNC: 17 MG/L
MICROALBUMIN/CREAT UR: 33.79 MG/G CR (ref 0–25)

## 2020-02-03 RX ORDER — WOUND DRESSING ADHESIVE - LIQUID
LIQUID MISCELLANEOUS
Qty: 15 ML | Refills: 1 | Status: SHIPPED | OUTPATIENT
Start: 2020-02-03

## 2020-02-03 ASSESSMENT — PAIN SCALES - GENERAL: PAINLEVEL: NO PAIN (0)

## 2020-02-03 ASSESSMENT — MIFFLIN-ST. JEOR: SCORE: 1526.47

## 2020-02-03 NOTE — LETTER
2/3/2020      RE: Mallory Barajas  [address]  [facility] [address]       Mallory Barajas is a 29 year old with type 1 diabetes mellitus (dx 05/2015) here today for a follow up visit. Her primary concern today is that her morning BGs are trending high.     Currently, Mallory uses a Tandem pump. She uses her sensor intermittently due to previous history of the sensor falling off. Her pump settings are as follows:    Basal insulin: 0.15 units/hr x 24 hrs   I:C - 1:15    Correction: 1 for 50 if >110  Sensitivity 50.    Her 24 hr basal insulin dose is low at 3.6 units/24 hrs.     We downloaded her insulin pump data today. Her average glucose was 174 mg/dl (range ) over the past month. She has BGs ranging from 119-205 in the mornings. BGs during mid-day are missing (she uses a different meter at work), but she consistently enters estimated carbs and administers bolus as indicated. She is also not correcting frequently throughout the day.      She continues to be very active. She checks her BGs before breakfast, which she eats at 5:45am (usually an egg bake). She uses a meter at work, which she uses to check her sugars ~8am and again before lunch. She has not noticed  A range beween  during these checks, except when she eats a sugary snack. She checks before and after dinner most days; on days that she has late games, she may eat late and not check her sugars after dinner.    She has noticed some lows, but she says these are usually associated with taking a bolus and an unfinished or delayed meall. Most recently, she had a BG ~50s after biking home and walking her dog. She ate fruit snakes, and was fine afterwards. She has not needed help with any of her lows.       ROS  Gen: No change in weight, hair, nails, or temperature intolerance  Eyes: No diplopia, redness, blurriness.   ENT: No dysphagia, dysphonia  Resp: No SOB, MENA, cough  CV: No CP, tachycardia, palpitations  GI: No abd pain, n/v/, diarrhea  : No  "hesitancy, frequency, dysuria, hematuria  Neuro: No numbness, tingling, loss of sensation.   Endo: Regular menses. LMP 01/15    Current Outpatient Medications   Medication     ACCU-CHEK MACARIO PLUS test strip     acetone, Urine, test STRP     blood glucose monitoring (ACCU-CHEK MACARIO PLUS) meter device kit     glucagon 1 MG injection     HUMALOG 100 UNIT/ML soln     insulin lispro (HUMALOG) 100 UNIT/ML Cartridge     insulin pen needle 30G X 5 MM     No current facility-administered medications for this visit.      Patient Active Problem List   Diagnosis     Hyperglycemia     Diabetes mellitus type 1 (H)     Right knee pain     Type 1 diabetes mellitus without complication (H)       FamHx: Mother with hx of type 2 diabetes.    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     Physical Exam  Vital signs:      BP: 119/76 Pulse: 66           Height: 177.8 cm (5' 10\") Weight: 72.1 kg (159 lb)  Estimated body mass index is 22.81 kg/m  as calculated from the following:    Height as of this encounter: 1.778 m (5' 10\").    Weight as of this encounter: 72.1 kg (159 lb).    Gen: NAD, VSS  Eyes: EOM grossly intact. Anicteric sclera. No   Resp: CTAB  CV: RRR, no m/r/g. No pedal edema.  Feet: Intact to monofilament bilaterally. 2+ DP, PT pulses bilatearlly. No lesions.  Neuro: 2+ DTR biceps bilaterally      Hemoglobin A1C   Date Value Ref Range Status   08/07/2019 6.3 (A) 4.3 - 6 % Final   07/31/2018 6.5 (A) 4.3 - 6 % Final   03/09/2018 6.10 (A) 4.3 - 6 % Final   05/28/2015 14.0 (H) 4.3 - 6.0 % Final     Creatinine   Date Value Ref Range Status   01/24/2019 0.85 0.52 - 1.04 mg/dL Final     GFR Estimate   Date Value Ref Range Status   01/24/2019 >90 >60 mL/min/[1.73_m2] Final     Comment:     Non  GFR Calc  Starting 12/18/2018, serum creatinine based estimated GFR (eGFR) will be   calculated using the Chronic Kidney Disease Epidemiology Collaboration   (CKD-EPI) equation.       TSH   Date Value Ref " Range Status   01/24/2019 0.96 0.40 - 4.00 mU/L Final   11/23/2016 1.05 0.40 - 4.00 mU/L Final       A1c today was 7.3.     Assessment and plan:  #Type 1 DM: Mallory's A1c today is 7.3, up from 6.3 at her last visit in 08/2019. Her am fasting sugars are generally within target, but sometimes high. She has no frequent hypoglycemia. However, she wears her sensor infrequently, and identifying trends with coverage during meals is therefore limited.   -- She has avoided wearing her sensor due to issues with the sensor falling off during her daily activities. We discussed using mastisol to help with adhesion issues   -- We also discussed updating to a Tandem control IQ pump, which she will revisit at a later visit depending on warranty on her current pump.    #Diabetes complications: Will   -- Neuropathy: Feet ok today.  -- Retinopathy: No concerns; up-to-date with optho.  -- Nephropathy: Creat and GFR wnl, urine microalbuminuria negative 01/2019. Will recheck today.     #CVD risk: Normotensive. Previously on a statin when originally diagnosed, but since discontinued. LDL is within target off the statin (LDL 67, chol 152, HDL 78 on 01/2019.)    RTC 3 months with Pricila, 6 months with Dr. Óscar Mello, MS3      Mallory was seen and examined by me with medical student Cairssa Mello.  Please see student's note of the same date for full details.  In brief she isn't wearing the sensor because it falls off.  Hasn't tried mastisol.  No lows.    ACCU-CHEK MACARIO PLUS test strip, USE TO TEST BLOOD SUGARS SIX TIMES DAILY OR AS DIRECTED  acetone, Urine, test STRP, Use 1 strip to check urine ketones in the event of unexplained high blood glucose, or in the case of illness.90 day supply 50 strips  blood glucose monitoring (ACCU-CHEK MACARIO PLUS) meter device kit, Use to test blood sugar 6 times daily or as directed.  glucagon 1 MG injection, Inject 1 mg into the muscle once for 1 dose  HUMALOG 100 UNIT/ML soln, USE TO FILL TANDEM  "T-SLIM INSULIN PUMP. USE APPROXIMATELY 30 UNITS DAILY  insulin lispro (HUMALOG) 100 UNIT/ML Cartridge, Use in pump aprox 30 units daily  insulin pen needle 30G X 5 MM, Use 4 pen needles daily or as directed.    No current facility-administered medications on file prior to visit.       ROS: 10 point ROS neg other than the symptoms noted above in the HPI.    Fm Hx - no thyroid disease    Vital signs:   /76   Pulse 66   Ht 1.778 m (5' 10\")   Wt 72.1 kg (159 lb)   BMI 22.81 kg/m     Estimated body mass index is 22.81 kg/m  as calculated from the following:    Height as of this encounter: 1.778 m (5' 10\").    Weight as of this encounter: 72.1 kg (159 lb).    VSS  NAD  Eyes - no periorbital edema, conjunctival injection, scleral icterus  Neck - no thyromegaly  Skin - normal texture   Mood - upbeat    Recent Labs   Lab Test 02/03/20 08/07/19 01/24/19  0831 01/24/19  0821  11/23/16  0939 11/23/16  0931  05/28/15  5208   A1C  --   --   --   --   --   --   --   --  14.0*   HEMOGLOBINA1 7.3* 6.3*  --   --    < >  --   --    < >  --    TSH  --   --   --  0.96  --   --  1.05  --   --    T4  --   --   --   --   --   --  1.05  --   --    LDL  --   --   --  67  --   --  86  --   --    HDL  --   --   --  78  --   --  76  --   --    TRIG  --   --   --  36  --   --  40  --   --    CR  --   --   --  0.85  --   --  0.88   < > 0.64   MICROL  --   --  7  --   --  8  --    < >  --     < > = values in this interval not displayed.       Assessment and plan:    1.  Diabetes control. A1c has gone up but still is acceptable.  Suggested she wear her sensor more regularly so she can be sure she is covering meals correctly.  Will prescribe mastisol to see if that helps her keep the sensor on.  Discussed upgrading to tandem control IQ pump. She will think about it.     2.  Diabetes complications.  Will check renal function today. Eyes and feet are OK.    3. CVD risk.  BP is fine.  LDL in target off statin    F/u with Pricila Larry in 3 mo " and f/u with me in 6 mo      I spent 25 minutes with this patient face to face and explained the conditions and plans (more than 50% of time was counseling/coordination of diabetes care with emphasis on how post meal BG contribute to A1c rise and how tandem control IQ works) . The patient understood and is satisfied with today's visit.     Josiane Cantrell MD

## 2020-02-03 NOTE — PROGRESS NOTES
Mallory Barajas is a 29 year old with type 1 diabetes mellitus (dx 05/2015) here today for a follow up visit. Her primary concern today is that her morning BGs are trending high.     Currently, Mallory uses a Tandem pump. She uses her sensor intermittently due to previous history of the sensor falling off. Her pump settings are as follows:    Basal insulin: 0.15 units/hr x 24 hrs   I:C - 1:15    Correction: 1 for 50 if >110  Sensitivity 50.    Her 24 hr basal insulin dose is low at 3.6 units/24 hrs.     We downloaded her insulin pump data today. Her average glucose was 174 mg/dl (range ) over the past month. She has BGs ranging from 119-205 in the mornings. BGs during mid-day are missing (she uses a different meter at work), but she consistently enters estimated carbs and administers bolus as indicated. She is also not correcting frequently throughout the day.      She continues to be very active. She checks her BGs before breakfast, which she eats at 5:45am (usually an egg bake). She uses a meter at work, which she uses to check her sugars ~8am and again before lunch. She has not noticed  A range beween  during these checks, except when she eats a sugary snack. She checks before and after dinner most days; on days that she has late games, she may eat late and not check her sugars after dinner.    She has noticed some lows, but she says these are usually associated with taking a bolus and an unfinished or delayed meall. Most recently, she had a BG ~50s after biking home and walking her dog. She ate fruit snakes, and was fine afterwards. She has not needed help with any of her lows.       ROS  Gen: No change in weight, hair, nails, or temperature intolerance  Eyes: No diplopia, redness, blurriness.   ENT: No dysphagia, dysphonia  Resp: No SOB, MENA, cough  CV: No CP, tachycardia, palpitations  GI: No abd pain, n/v/, diarrhea  : No hesitancy, frequency, dysuria, hematuria  Neuro: No numbness, tingling, loss of  "sensation.   Endo: Regular menses. LMP 01/15    Current Outpatient Medications   Medication     ACCU-CHEK MACARIO PLUS test strip     acetone, Urine, test STRP     blood glucose monitoring (ACCU-CHEK MACARIO PLUS) meter device kit     glucagon 1 MG injection     HUMALOG 100 UNIT/ML soln     insulin lispro (HUMALOG) 100 UNIT/ML Cartridge     insulin pen needle 30G X 5 MM     No current facility-administered medications for this visit.      Patient Active Problem List   Diagnosis     Hyperglycemia     Diabetes mellitus type 1 (H)     Right knee pain     Type 1 diabetes mellitus without complication (H)       FamHx: Mother with hx of type 2 diabetes.    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     Physical Exam  Vital signs:      BP: 119/76 Pulse: 66           Height: 177.8 cm (5' 10\") Weight: 72.1 kg (159 lb)  Estimated body mass index is 22.81 kg/m  as calculated from the following:    Height as of this encounter: 1.778 m (5' 10\").    Weight as of this encounter: 72.1 kg (159 lb).    Gen: NAD, VSS  Eyes: EOM grossly intact. Anicteric sclera. No   Resp: CTAB  CV: RRR, no m/r/g. No pedal edema.  Feet: Intact to monofilament bilaterally. 2+ DP, PT pulses bilatearlly. No lesions.  Neuro: 2+ DTR biceps bilaterally      Hemoglobin A1C   Date Value Ref Range Status   08/07/2019 6.3 (A) 4.3 - 6 % Final   07/31/2018 6.5 (A) 4.3 - 6 % Final   03/09/2018 6.10 (A) 4.3 - 6 % Final   05/28/2015 14.0 (H) 4.3 - 6.0 % Final     Creatinine   Date Value Ref Range Status   01/24/2019 0.85 0.52 - 1.04 mg/dL Final     GFR Estimate   Date Value Ref Range Status   01/24/2019 >90 >60 mL/min/[1.73_m2] Final     Comment:     Non  GFR Calc  Starting 12/18/2018, serum creatinine based estimated GFR (eGFR) will be   calculated using the Chronic Kidney Disease Epidemiology Collaboration   (CKD-EPI) equation.       TSH   Date Value Ref Range Status   01/24/2019 0.96 0.40 - 4.00 mU/L Final   11/23/2016 1.05 0.40 - 4.00 " mU/L Final       A1c today was 7.3.     Assessment and plan:  #Type 1 DM: Mallory's A1c today is 7.3, up from 6.3 at her last visit in 08/2019. Her am fasting sugars are generally within target, but sometimes high. She has no frequent hypoglycemia. However, she wears her sensor infrequently, and identifying trends with coverage during meals is therefore limited.   -- She has avoided wearing her sensor due to issues with the sensor falling off during her daily activities. We discussed using mastisol to help with adhesion issues   -- We also discussed updating to a Tandem control IQ pump, which she will revisit at a later visit depending on warranty on her current pump.    #Diabetes complications: Will   -- Neuropathy: Feet ok today.  -- Retinopathy: No concerns; up-to-date with optho.  -- Nephropathy: Creat and GFR wnl, urine microalbuminuria negative 01/2019. Will recheck today.     #CVD risk: Normotensive. Previously on a statin when originally diagnosed, but since discontinued. LDL is within target off the statin (LDL 67, chol 152, HDL 78 on 01/2019.)    RTC 3 months with Pricila, 6 months with Dr. Óscar Mello, MS3

## 2020-02-03 NOTE — LETTER
Patient:  Mallory Barajas  :   1990  MRN:     [id number]        Ms.Emma Barajas  [address]  [facility] [address]        2020    Dear ,    Here is the result of the tests done on Tuesday. The urine test has a very small amount of albumin in it.  I don't think this is anything to worry about.  We will repeat it next time.      Resulted Orders   Hemoglobin A1c POCT   Result Value Ref Range    Hemoglobin A1C 7.3 (A) 4.3 - 6 %   Albumin Random Urine Quantitative with Creat Ratio   Result Value Ref Range    Creatinine Urine 51 mg/dL    Albumin Urine mg/L 17 mg/L    Albumin Urine mg/g Cr 33.79 (H) 0 - 25 mg/g Cr       It was a pleasure to see you in clinic.  Please let me know if you have any questions or concerns about this information.  The best way to reach me or one of the endocrine nurses is to send a Meetmeals message or call .  Sincerely,    Josiane Cantrell MD    [id number]  1990

## 2020-02-03 NOTE — LETTER
2/3/2020       RE: Mallory Barajas  [address]  [facility] [address]     Dear Colleague,    Thank you for referring your patient, Mallory Barajas, to the [facility] ENDOCRINOLOGY at [facility]. Please see a copy of my visit note below.    Mallory Barajas is a 29 year old with type 1 diabetes mellitus (dx 05/2015) here today for a follow up visit. Her primary concern today is that her morning BGs are trending high.     Currently, Mallory uses a Tandem pump. She uses her sensor intermittently due to previous history of the sensor falling off. Her pump settings are as follows:    Basal insulin: 0.15 units/hr x 24 hrs   I:C - 1:15    Correction: 1 for 50 if >110  Sensitivity 50.    Her 24 hr basal insulin dose is low at 3.6 units/24 hrs.     We downloaded her insulin pump data today. Her average glucose was 174 mg/dl (range ) over the past month. She has BGs ranging from 119-205 in the mornings. BGs during mid-day are missing (she uses a different meter at work), but she consistently enters estimated carbs and administers bolus as indicated. She is also not correcting frequently throughout the day.      She continues to be very active. She checks her BGs before breakfast, which she eats at 5:45am (usually an egg bake). She uses a meter at work, which she uses to check her sugars ~8am and again before lunch. She has not noticed  A range beween  during these checks, except when she eats a sugary snack. She checks before and after dinner most days; on days that she has late games, she may eat late and not check her sugars after dinner.    She has noticed some lows, but she says these are usually associated with taking a bolus and an unfinished or delayed meall. Most recently, she had a BG ~50s after biking home and walking her dog. She ate fruit snakes, and was fine afterwards. She has not needed help with any of her lows.       ROS  Gen: No change in weight, hair, nails, or  "temperature intolerance  Eyes: No diplopia, redness, blurriness.   ENT: No dysphagia, dysphonia  Resp: No SOB, MENA, cough  CV: No CP, tachycardia, palpitations  GI: No abd pain, n/v/, diarrhea  : No hesitancy, frequency, dysuria, hematuria  Neuro: No numbness, tingling, loss of sensation.   Endo: Regular menses. LMP 01/15    Current Outpatient Medications   Medication     ACCU-CHEK MACARIO PLUS test strip     acetone, Urine, test STRP     blood glucose monitoring (ACCU-CHEK MACARIO PLUS) meter device kit     glucagon 1 MG injection     HUMALOG 100 UNIT/ML soln     insulin lispro (HUMALOG) 100 UNIT/ML Cartridge     insulin pen needle 30G X 5 MM     No current facility-administered medications for this visit.      Patient Active Problem List   Diagnosis     Hyperglycemia     Diabetes mellitus type 1 (H)     Right knee pain     Type 1 diabetes mellitus without complication (H)       FamHx: Mother with hx of type 2 diabetes.    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     Physical Exam  Vital signs:      BP: 119/76 Pulse: 66           Height: 177.8 cm (5' 10\") Weight: 72.1 kg (159 lb)  Estimated body mass index is 22.81 kg/m  as calculated from the following:    Height as of this encounter: 1.778 m (5' 10\").    Weight as of this encounter: 72.1 kg (159 lb).    Gen: NAD, VSS  Eyes: EOM grossly intact. Anicteric sclera. No   Resp: CTAB  CV: RRR, no m/r/g. No pedal edema.  Feet: Intact to monofilament bilaterally. 2+ DP, PT pulses bilatearlly. No lesions.  Neuro: 2+ DTR biceps bilaterally      Hemoglobin A1C   Date Value Ref Range Status   08/07/2019 6.3 (A) 4.3 - 6 % Final   07/31/2018 6.5 (A) 4.3 - 6 % Final   03/09/2018 6.10 (A) 4.3 - 6 % Final   05/28/2015 14.0 (H) 4.3 - 6.0 % Final     Creatinine   Date Value Ref Range Status   01/24/2019 0.85 0.52 - 1.04 mg/dL Final     GFR Estimate   Date Value Ref Range Status   01/24/2019 >90 >60 mL/min/[1.73_m2] Final     Comment:     Non  GFR " Calc  Starting 12/18/2018, serum creatinine based estimated GFR (eGFR) will be   calculated using the Chronic Kidney Disease Epidemiology Collaboration   (CKD-EPI) equation.       TSH   Date Value Ref Range Status   01/24/2019 0.96 0.40 - 4.00 mU/L Final   11/23/2016 1.05 0.40 - 4.00 mU/L Final       A1c today was 7.3.     Assessment and plan:  #Type 1 DM: Mallory's A1c today is 7.3, up from 6.3 at her last visit in 08/2019. Her am fasting sugars are generally within target, but sometimes high. She has no frequent hypoglycemia. However, she wears her sensor infrequently, and identifying trends with coverage during meals is therefore limited.   -- She has avoided wearing her sensor due to issues with the sensor falling off during her daily activities. We discussed using mastisol to help with adhesion issues   -- We also discussed updating to a Tandem control IQ pump, which she will revisit at a later visit depending on warranty on her current pump.    #Diabetes complications: Will   -- Neuropathy: Feet ok today.  -- Retinopathy: No concerns; up-to-date with optho.  -- Nephropathy: Creat and GFR wnl, urine microalbuminuria negative 01/2019. Will recheck today.     #CVD risk: Normotensive. Previously on a statin when originally diagnosed, but since discontinued. LDL is within target off the statin (LDL 67, chol 152, HDL 78 on 01/2019.)    RTC 3 months with Pricila, 6 months with Dr. Óscar Mello, MS3      Mallory was seen and examined by me with medical student Carissa Mello.  Please see student's note of the same date for full details.  In brief she isn't wearing the sensor because it falls off.  Hasn't tried mastisol.  No lows.    ACCU-CHEK MACARIO PLUS test strip, USE TO TEST BLOOD SUGARS SIX TIMES DAILY OR AS DIRECTED  acetone, Urine, test STRP, Use 1 strip to check urine ketones in the event of unexplained high blood glucose, or in the case of illness.90 day supply 50 strips  blood glucose monitoring (ACCU-CHEK  "MACARIO PLUS) meter device kit, Use to test blood sugar 6 times daily or as directed.  glucagon 1 MG injection, Inject 1 mg into the muscle once for 1 dose  HUMALOG 100 UNIT/ML soln, USE TO FILL TANDEM T-SLIM INSULIN PUMP. USE APPROXIMATELY 30 UNITS DAILY  insulin lispro (HUMALOG) 100 UNIT/ML Cartridge, Use in pump aprox 30 units daily  insulin pen needle 30G X 5 MM, Use 4 pen needles daily or as directed.    No current facility-administered medications on file prior to visit.       ROS: 10 point ROS neg other than the symptoms noted above in the HPI.    Fm Hx - no thyroid disease    Vital signs:   /76   Pulse 66   Ht 1.778 m (5' 10\")   Wt 72.1 kg (159 lb)   BMI 22.81 kg/m     Estimated body mass index is 22.81 kg/m  as calculated from the following:    Height as of this encounter: 1.778 m (5' 10\").    Weight as of this encounter: 72.1 kg (159 lb).    VSS  NAD  Eyes - no periorbital edema, conjunctival injection, scleral icterus  Neck - no thyromegaly  Skin - normal texture   Mood - upbeat    Recent Labs   Lab Test 02/03/20 08/07/19 01/24/19  0831 01/24/19  0821  11/23/16  0939 11/23/16  0931  05/28/15  8219   A1C  --   --   --   --   --   --   --   --  14.0*   HEMOGLOBINA1 7.3* 6.3*  --   --    < >  --   --    < >  --    TSH  --   --   --  0.96  --   --  1.05  --   --    T4  --   --   --   --   --   --  1.05  --   --    LDL  --   --   --  67  --   --  86  --   --    HDL  --   --   --  78  --   --  76  --   --    TRIG  --   --   --  36  --   --  40  --   --    CR  --   --   --  0.85  --   --  0.88   < > 0.64   MICROL  --   --  7  --   --  8  --    < >  --     < > = values in this interval not displayed.       Assessment and plan:    1.  Diabetes control. A1c has gone up but still is acceptable.  Suggested she wear her sensor more regularly so she can be sure she is covering meals correctly.  Will prescribe mastisol to see if that helps her keep the sensor on.  Discussed upgrading to tandem control IQ pump. " She will think about it.     2.  Diabetes complications.  Will check renal function today. Eyes and feet are OK.    3. CVD risk.  BP is fine.  LDL in target off statin    F/u with Pricila Larry in 3 mo and f/u with me in 6 mo      I spent 25 minutes with this patient face to face and explained the conditions and plans (more than 50% of time was counseling/coordination of diabetes care with emphasis on how post meal BG contribute to A1c rise and how tandem control IQ works) . The patient understood and is satisfied with today's visit.     Josiane Cantrell MD                      Again, thank you for allowing me to participate in the care of your patient.      Sincerely,    Josiane Cantrell MD

## 2020-02-03 NOTE — PROGRESS NOTES
"Mallory was seen and examined by me with medical student Carissa Mello.  Please see student's note of the same date for full details.  In brief she isn't wearing the sensor because it falls off.  Hasn't tried mastisol.  No lows.    ACCU-CHEK MACARIO PLUS test strip, USE TO TEST BLOOD SUGARS SIX TIMES DAILY OR AS DIRECTED  acetone, Urine, test STRP, Use 1 strip to check urine ketones in the event of unexplained high blood glucose, or in the case of illness.90 day supply 50 strips  blood glucose monitoring (ACCU-CHEK MACARIO PLUS) meter device kit, Use to test blood sugar 6 times daily or as directed.  glucagon 1 MG injection, Inject 1 mg into the muscle once for 1 dose  HUMALOG 100 UNIT/ML soln, USE TO FILL TANDEM T-SLIM INSULIN PUMP. USE APPROXIMATELY 30 UNITS DAILY  insulin lispro (HUMALOG) 100 UNIT/ML Cartridge, Use in pump aprox 30 units daily  insulin pen needle 30G X 5 MM, Use 4 pen needles daily or as directed.    No current facility-administered medications on file prior to visit.       ROS: 10 point ROS neg other than the symptoms noted above in the HPI.    Fm Hx - no thyroid disease    Vital signs:   /76   Pulse 66   Ht 1.778 m (5' 10\")   Wt 72.1 kg (159 lb)   BMI 22.81 kg/m    Estimated body mass index is 22.81 kg/m  as calculated from the following:    Height as of this encounter: 1.778 m (5' 10\").    Weight as of this encounter: 72.1 kg (159 lb).    VSS  NAD  Eyes - no periorbital edema, conjunctival injection, scleral icterus  Neck - no thyromegaly  Skin - normal texture   Mood - upbeat    Recent Labs   Lab Test 02/03/20 08/07/19 01/24/19  0831 01/24/19  0821  11/23/16  0939 11/23/16  0931  05/28/15  7336   A1C  --   --   --   --   --   --   --   --  14.0*   HEMOGLOBINA1 7.3* 6.3*  --   --    < >  --   --    < >  --    TSH  --   --   --  0.96  --   --  1.05  --   --    T4  --   --   --   --   --   --  1.05  --   --    LDL  --   --   --  67  --   --  86  --   --    HDL  --   --   --  78  --   --  76  " --   --    TRIG  --   --   --  36  --   --  40  --   --    CR  --   --   --  0.85  --   --  0.88   < > 0.64   MICROL  --   --  7  --   --  8  --    < >  --     < > = values in this interval not displayed.       Assessment and plan:    1.  Diabetes control. A1c has gone up but still is acceptable.  Suggested she wear her sensor more regularly so she can be sure she is covering meals correctly.  Will prescribe mastisol to see if that helps her keep the sensor on.  Discussed upgrading to tandem control IQ pump. She will think about it.     2.  Diabetes complications.  Will check renal function today. Eyes and feet are OK.    3. CVD risk.  BP is fine.  LDL in target off statin    F/u with Pricila Larry in 3 mo and f/u with me in 6 mo      I spent 25 minutes with this patient face to face and explained the conditions and plans (more than 50% of time was counseling/coordination of diabetes care with emphasis on how post meal BG contribute to A1c rise and how tandem control IQ works) . The patient understood and is satisfied with today's visit.     Josiane Cantrell MD

## 2020-03-04 ENCOUNTER — TELEPHONE (OUTPATIENT)
Dept: ENDOCRINOLOGY | Facility: CLINIC | Age: 30
End: 2020-03-04

## 2020-03-04 NOTE — TELEPHONE ENCOUNTER
[facility] Call Center    Phone Message    May a detailed message be left on voicemail: yes     Reason for Call: Other: Patient states the cost of her CGM has gone up over $400 each month.  Please call to discuss.      Action Taken: Message routed to:  [facility] Endo    Travel Screening: Not Applicable

## 2020-03-05 NOTE — TELEPHONE ENCOUNTER
Advised patient to call insurance plan to see if she can get this billed under prescription plan and see what is bring covered.

## 2020-03-18 ENCOUNTER — TELEPHONE (OUTPATIENT)
Dept: ENDOCRINOLOGY | Facility: CLINIC | Age: 30
End: 2020-03-18

## 2020-03-18 NOTE — TELEPHONE ENCOUNTER
[facility] Call Center    Phone Message    May a detailed message be left on voicemail: yes     Reason for Call: Pt called and requested to speak with a nurse in chaning the method of getting pt's continous glucose monitor. Pt stated pt received a call from a rep stating pt can get this from pt's choice of pharmacy. Pt would like the prescription to be sent to [facility] pharmacy on [address] [facility] [facility] [facility]. Please call back pt for updates. Thanks.    Action Taken: Message routed to:  [facility] Endo    Travel Screening: Not Applicable

## 2020-03-19 DIAGNOSIS — E10.9 TYPE 1 DIABETES MELLITUS WITHOUT COMPLICATION (H): Primary | ICD-10-CM

## 2020-03-19 RX ORDER — PROCHLORPERAZINE 25 MG/1
SUPPOSITORY RECTAL
Qty: 3 EACH | Refills: 11 | Status: SHIPPED | OUTPATIENT
Start: 2020-03-19 | End: 2020-04-01

## 2020-03-19 NOTE — TELEPHONE ENCOUNTER
I spoke with Mallory and she  Asked me to send the  DEXCOM g6 sensors to [facility].  If they do not carry them then the plan is to send to [facility] mail order. Currently  She was quoted $500 for the sensors  But was told it is cheaper to go through your pharmacy . Alexa Stuart, RN on 3/19/2020 at 11:39 AM

## 2020-03-20 ENCOUNTER — TELEPHONE (OUTPATIENT)
Dept: ENDOCRINOLOGY | Facility: CLINIC | Age: 30
End: 2020-03-20

## 2020-03-20 NOTE — TELEPHONE ENCOUNTER
Prior Authorization Retail Medication Request    Medication/Dose: Dexcom g6  ICD code (if different than what is on RX):  [id number]  Previously Tried and Failed: As a back testing strip    Rationale: Type 1 diabetes mellitus without complication      Insurance Name:  [employer]   Insurance ID:  [id number]       Pharmacy Information (if different than what is on RX)  Name:  [facility]   Phone:  [phone]

## 2020-03-20 NOTE — TELEPHONE ENCOUNTER
I personally reviewed the [place] prescription drug monitoring program online database.  I refilled the prescription.        He is due for recheck too.    PA Initiation    Medication: Dexcom G6  -   Insurance Company: [employer] - Phone [phone] Fax [phone]  [id number] - [place] [id number] [facility] AT [address]  Filling Pharmacy Phone: [phone]  Filling Pharmacy Fax: [phone]  Start Date: 3/20/2020

## 2020-03-20 NOTE — TELEPHONE ENCOUNTER
PA Initiation    Medication: Dexcom G6 Transmitter -   Insurance Company: [employer] - Phone [phone] Fax [phone]  [id number] - [place] [address] [facility] AT [address]  Filling Pharmacy Phone: [phone]  Filling Pharmacy Fax: [phone]  Start Date: 3/20/2020

## 2020-03-20 NOTE — TELEPHONE ENCOUNTER
Prior Authorization Approval    Medication: Dexcom G6  - APPROVED was approved on 3/20/2020  Effective: 2/19/2020 to 3/20/2023  Reference #:    Approved Dose/Quantity:   Insurance Company: [employer] - Phone [phone] Fax [phone]  Expected CoPay:    Pharmacy Filling the Rx: [facility] #[id number] - [place], [place] - [address] [place] [address] AT [address]  Pharmacy Notified: Yes  Patient Notified: Comment:  **Instructed pharmacy to notify patient when script is ready to /ship.**

## 2020-03-20 NOTE — TELEPHONE ENCOUNTER
Prior Authorization Approval    Medication: Dexcom G6 Transmitter - APPROVED was approved on 3/20/2020  Effective: 2/19/2020 to 3/20/2023  Reference #:    Approved Dose/Quantity:   Insurance Company: [employer] - Phone [phone] Fax [phone]  Expected CoPay:    Pharmacy Filling the Rx: [facility] #[id number] - [place], [place] - [address] [place] [address] AT [address]  Pharmacy Notified: Yes  Patient Notified: Comment:  **Instructed pharmacy to notify patient when script is ready to /ship.**

## 2020-03-30 ENCOUNTER — DOCUMENTATION ONLY (OUTPATIENT)
Dept: ENDOCRINOLOGY | Facility: CLINIC | Age: 30
End: 2020-03-30

## 2020-03-30 NOTE — PROGRESS NOTES
Forms received from: [employer]     Forms were [employer] for insulin pump    Emailed to [employer] 3/30/20

## 2020-03-31 ENCOUNTER — TELEPHONE (OUTPATIENT)
Dept: ENDOCRINOLOGY | Facility: CLINIC | Age: 30
End: 2020-03-31

## 2020-03-31 DIAGNOSIS — E10.9 TYPE 1 DIABETES MELLITUS WITHOUT COMPLICATION (H): Primary | ICD-10-CM

## 2020-03-31 NOTE — TELEPHONE ENCOUNTER
[facility] Call Center    Phone Message    May a detailed message be left on voicemail: yes     Reason for Call: Other: Pt is calling back for an update on the status/progress of her dexcom G6 supplies, and also about trying to get her supplies through [employer]. Pt had spoken with Nurse Liu a few times about it, but stated she hadn't heard back with any updates recently.  Please call Pt back to update asap.     Action Taken: Message routed to:  [facility] endo    Travel Screening: Not Applicable

## 2020-04-01 DIAGNOSIS — E10.9 TYPE 1 DIABETES MELLITUS WITHOUT COMPLICATION (H): ICD-10-CM

## 2020-04-01 RX ORDER — PROCHLORPERAZINE 25 MG/1
SUPPOSITORY RECTAL
Qty: 1 EACH | Refills: 0 | Status: SHIPPED | OUTPATIENT
Start: 2020-04-01 | End: 2022-09-27

## 2020-04-01 RX ORDER — PROCHLORPERAZINE 25 MG/1
SUPPOSITORY RECTAL
Qty: 3 EACH | Refills: 11 | Status: SHIPPED | OUTPATIENT
Start: 2020-04-01 | End: 2021-04-27

## 2020-04-01 RX ORDER — PROCHLORPERAZINE 25 MG/1
SUPPOSITORY RECTAL
Qty: 1 EACH | Refills: 0 | Status: SHIPPED | OUTPATIENT
Start: 2020-04-01

## 2020-04-01 RX ORDER — PROCHLORPERAZINE 25 MG/1
SUPPOSITORY RECTAL
Qty: 1 EACH | Refills: 3 | Status: SHIPPED | OUTPATIENT
Start: 2020-04-01 | End: 2021-05-04

## 2020-04-01 RX ORDER — PROCHLORPERAZINE 25 MG/1
SUPPOSITORY RECTAL
Qty: 1 EACH | Refills: 3 | Status: SHIPPED | OUTPATIENT
Start: 2020-04-01 | End: 2020-04-01

## 2020-04-01 NOTE — TELEPHONE ENCOUNTER
Phone call to pt. No answer. [employer] form was emailed to rep 3/30/20. Pt Dexcom Pa was approved 3/20/20. RX for Dexcom [facility] and pharmacy was instructed to notify pt when Rx is ready. Will follow up with [facility].

## 2020-04-09 ENCOUNTER — DOCUMENTATION ONLY (OUTPATIENT)
Dept: CARE COORDINATION | Facility: CLINIC | Age: 30
End: 2020-04-09

## 2020-05-06 NOTE — PROGRESS NOTES
"Mallory Barajas is a 30 year old female who is being evaluated via a billable video visit.      The patient has been notified of following:     \"This video visit will be conducted via a call between you and your physician/provider. We have found that certain health care needs can be provided without the need for an in-person physical exam.  This service lets us provide the care you need with a video conversation.  If a prescription is necessary we can send it directly to your pharmacy.  If lab work is needed we can place an order for that and you can then stop by our lab to have the test done at a later time.    Video visits are billed at different rates depending on your insurance coverage.  Please reach out to your insurance provider with any questions.    If during the course of the call the physician/provider feels a video visit is not appropriate, you will not be charged for this service.\"    Patient has given verbal consent for Video visit? Yes    How would you like to obtain your AVS? E-Mail (inform patient AVS not encrypted)    Patient would like the video invitation sent by: Send to e-mail at: [email or web address]      Video-Visit Details    Type of service:  Video Visit    Distant Location (provider location):  [facility] ENDOCRINOLOGY     Brenda Herrera MA        "

## 2020-05-07 ENCOUNTER — VIRTUAL VISIT (OUTPATIENT)
Dept: ENDOCRINOLOGY | Facility: CLINIC | Age: 30
End: 2020-05-07
Payer: COMMERCIAL

## 2020-05-07 DIAGNOSIS — E10.9 TYPE 1 DIABETES MELLITUS WITHOUT COMPLICATION (H): Primary | ICD-10-CM

## 2020-05-07 NOTE — PROGRESS NOTES
Mallory Barajas is a 30 year old female who is being evaluated via a billable video visit.      The patient has been notified of following:     This video visit will be conducted via a call between you and your physician/provider. We have found that certain health care needs can be provided without the need for an in-person physical exam.  This service lets us provide the care you need with a video conversation.  If a prescription is necessary we can send it directly to your pharmacy.  If lab work is needed we can place an order for that and you can then stop by our lab to have the test done at a later time.    Video visits are billed at different rates depending on your insurance coverage.  Please reach out to your insurance provider with any questions.    If during the course of the call the physician/provider feels a video visit is not appropriate, you will not be charged for this service.    Patient has given verbal consent for Video visit? Yes    Patient would like the video invitation sent by: Send to e-mail at: [email or web address]      Video-Visit Details    Type of service:  Video Visit    Distant Location (provider location):  [facility] ENDOCRINOLOGY     Brenda Herrera MA    Due to the COVID 19 pandemic this visit was converted to a video visit in order to help prevent spread of infection in this patient and the general population.    Time of start: 3:00 pm  Time of end: 3:25 pm.  Total duration of video visit: 25 minutes.    This visit would have been as a 29150 visit today.    HPI  Mallory Barajas is a 30 year old female with type 1 diabetes mellitus.  Video visit today for diabetes follow up.  Pt was dx having type 1 diabetes mellitus in May 2015.   Mallory is currently taking using a Tandem insulin pump- control IQ and DexcomG6 sensor.  Her current basal insulin rates are:  Midnight= 0.3 units/hr.  6:30 am = 0.150 units/hr.  9 pm= 0.3 units/hr.  Her 24 hr basal insulin dose is low at 5.03 units/24 hrs.  Her I/C  ratio is 1:15 and sensitivity 50.  Mallory's A1C was 7.3 % on 2/3/2020.   Her previous A1C was 6.9 %.  Her average blood glucose was 140 from 4/22-5/5/2020.  She had a few blood sugars following exercise.  Her Dexcom showed an average blood sugar of 125 with SD 36 with an estimated A1C of 6.3 %.  Her blood sugar was in target 90 %; above target 7 % and below target 3 %.  On ROS today, Mallory eats healthy and is very active with sports.  She denies frequent headaches, blurred vision, n/v, SOB at rest or cough. No chest pain, abd pain, diarrhea, dysuria or hematuria.  She denies numbness or tingling in her feet or hands.  Mallory denies any foot ulcers at this time.    DIABETES CARE:   Retinopathy: none; will schedule her for an annual eye exam.  Nephropathy: none; urine microalbuminuria was + in 2/2020 and negative in the past. Will recheck.  Neuropathy: none.  Foot exam: not done today.  Lipids: LDL 67 in Jan 2019.  Insulin: Tandem insulin pump- control IQ.  Testing: DexcomG6 sensor.    ROS  Please see under HPI.    Allergies  No Known Allergies    MEDICATIONS:  Reviewed with pt today.    Family History  Mother with hx of type 2 diabetes.  First cousin with hx of type 1 diabetes.    Social History  Smoke: none.  ETOH: rare.  Single and no children.  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    Past Medical History  Past Medical History:   Diagnosis Date     NO ACTIVE PROBLEMS (aka NONE)      Past Surgical History:   Procedure Laterality Date     ARTHROSCOPY KNEE BILATERAL         Physical Exam    No exam today.    RESULTS  Creatinine   Date Value Ref Range Status   02/03/2020 0.78 0.52 - 1.04 mg/dL Final     GFR Estimate   Date Value Ref Range Status   02/03/2020 >90 >60 mL/min/[1.73_m2] Final     Comment:     Non  GFR Calc  Starting 12/18/2018, serum creatinine based estimated GFR (eGFR) will be   calculated using the Chronic Kidney Disease Epidemiology Collaboration    (CKD-EPI) equation.       Hemoglobin A1C   Date Value Ref Range Status   05/28/2015 14.0 (H) 4.3 - 6.0 % Final     Potassium   Date Value Ref Range Status   05/29/2015 3.5 3.4 - 5.3 mmol/L Final     ALT   Date Value Ref Range Status   01/24/2019 13 0 - 50 U/L Final     AST   Date Value Ref Range Status   01/24/2019 10 0 - 45 U/L Final     TSH   Date Value Ref Range Status   01/24/2019 0.96 0.40 - 4.00 mU/L Final     T4 Free   Date Value Ref Range Status   11/23/2016 1.05 0.76 - 1.46 ng/dL Final     A1C      6.3    8/7/2019  A1C      6.9    1/17/2019  A1C      6.5    7/31/2018  A1C      6.5    3/9/2018  A1C      6.3    7/14/2017  A1C      6.2    3/6/2017  A1C      5.7    11/23/2016  A1C      6.4    8/22/2016  A1C      6.5    1/13/2016  A1C      7.3    8/20/2015  A1C      7.2    8/7/2015.  A1C     14.0   5/28/2015    ASSESSMENT/PLAN:    1. TYPE 1 DIABETES MELLITUS: Mallory continues to do a good job with her diabetes care and her blood sugar values remain good. No insulin changes today.  Will schedule her to see Oph here later this summer for her annual diabetic eye exam.  Pt's urine microalbuminuria was + in 2/2020 and had been negative in the past. She exercises daily. Will recheck.  Mallory denies any foot ulcers at this time.  Her LDL was 67 with HDL 78 in Jan 2019.    2.  Return to Endocrine Clinic to see Dr. Cantrell in Aug 2020.  I placed an order for annual fasting diabetes labs including an A1C.

## 2020-05-12 ENCOUNTER — TELEPHONE (OUTPATIENT)
Dept: ENDOCRINOLOGY | Facility: CLINIC | Age: 30
End: 2020-05-12

## 2020-06-07 ENCOUNTER — NURSE TRIAGE (OUTPATIENT)
Dept: NURSING | Facility: CLINIC | Age: 30
End: 2020-06-07

## 2020-06-07 NOTE — TELEPHONE ENCOUNTER
Patient needs prescription for Humalog and Aparna Plus test strips sent to [facility] on [address] in [place].  Originally filled at [facility] and due to [facility] on [facility] being destroyed unable to request transfer via telephone.  Any questions/concerns, contact patient at [phone]; okay to leave message at that number.    Ana Hernandez RN  [facility] Triage Nurse Advisor    Please close encounter when completed.

## 2020-06-08 DIAGNOSIS — E10.9 DIABETES MELLITUS TYPE 1 (H): ICD-10-CM

## 2020-06-08 RX ORDER — INSULIN LISPRO 100 [IU]/ML
INJECTION, SOLUTION INTRAVENOUS; SUBCUTANEOUS
Qty: 30 ML | Refills: 3 | Status: SHIPPED | OUTPATIENT
Start: 2020-06-08 | End: 2021-11-23

## 2020-06-08 RX ORDER — BLOOD SUGAR DIAGNOSTIC
STRIP MISCELLANEOUS
Qty: 600 EACH | Refills: 3 | Status: SHIPPED | OUTPATIENT
Start: 2020-06-08

## 2020-08-11 ENCOUNTER — VIRTUAL VISIT (OUTPATIENT)
Dept: ENDOCRINOLOGY | Facility: CLINIC | Age: 30
End: 2020-08-11
Payer: COMMERCIAL

## 2020-08-11 DIAGNOSIS — E10.9 TYPE 1 DIABETES MELLITUS WITHOUT COMPLICATION (H): Primary | ICD-10-CM

## 2020-08-11 RX ORDER — GLUCAGON 3 MG/1
1 POWDER NASAL PRN
Qty: 2 EACH | Refills: 3 | Status: SHIPPED | OUTPATIENT
Start: 2020-08-11 | End: 2022-03-31

## 2020-08-11 NOTE — LETTER
"8/11/2020       RE: Mallory Barajas  [address]  [facility] [address]     Dear Colleague,    Thank you for referring your patient, Mallory Barajas, to the [facility] ENDOCRINOLOGY at [facility]. Please see a copy of my visit note below.    Mallory Barajas is a 30 year old female who is being evaluated via a billable video visit.      The patient has been notified of following:     \"This video visit will be conducted via a call between you and your physician/provider. We have found that certain health care needs can be provided without the need for an in-person physical exam.  This service lets us provide the care you need with a video conversation.  If a prescription is necessary we can send it directly to your pharmacy.  If lab work is needed we can place an order for that and you can then stop by our lab to have the test done at a later time.    Video visits are billed at different rates depending on your insurance coverage.  Please reach out to your insurance provider with any questions.    If during the course of the call the physician/provider feels a video visit is not appropriate, you will not be charged for this service.\"    Patient has given verbal consent for Video visit? Yes  How would you like to obtain your AVS? Mail a copy  If you are dropped from the video visit, the video invite should be resent to: Send to e-mail at: [email or web address]  Will anyone else be joining your video visit? No        Video-Visit Details    Type of service:  Video Visit    Video Start Time:10:50  Video End Time: 11:11 AM    Originating Location (pt. Location): Home    Distant Location (provider location):  [facility] ENDOCRINOLOGY     Platform used for Video Visit: Gabe Cantrell MD      Mallory Barajas is a 30 year old female with type 1 diabetes mellitus.  Video visit today for diabetes follow up.  Pt was dx having type 1 diabetes mellitus in May 2015.     Mallory is currently taking " using a Tandem insulin pump- control IQ and DexcomG6 sensor.  Over the last month she used the control IQ 86% of the time.  Her current basal insulin rates are:  Midnight= 0.3 units/hr.  6:30 am = 0.150 units/hr.  9 pm= 0.3 units/hr.  Her 24 hr basal insulin dose is low at 5.03 units/24 hrs.  Her I/C ratio is 1:15 and sensitivity 50.  Target is 110    She boluses 8.33 times a day on average.    Over the last month she wore the cgm 97% of the time.  She was in range 89% of the time, above range 5% of the time, 54-70 4% of the time, and < 54 1% of the time.  Calculated A1c was 6.2%.  Average sensor glucose is 120.    Reviewing the daily information shows that she frequently is low after meals.  She recovers from this very easily and stays in target most of the time.  She is in target overnight.  She reports she does exercise pretty much daily.  When she does more aerobic exercise, she disconnects the pump.  When she goes for a walk she does not do anything.  She has not had any trouble recognizing her hypoglycemia.    She has not seen the eye doctor yet this year but has no visual complaints.  Her feet are fine.  She has normal menses.      Current Outpatient Medications   Medication     acetone, Urine, test STRP     blood glucose (ACCU-CHEK MACARIO PLUS) test strip     blood glucose monitoring (ACCU-CHEK MACARIO PLUS) meter device kit     Continuous Blood Gluc  (DEXCOM G6 ) GHADA     Continuous Blood Gluc  (DEXCOM G6 ) GHADA     Continuous Blood Gluc Sensor (DEXCOM G6 SENSOR) MISC     Continuous Blood Gluc Transmit (DEXCOM G6 TRANSMITTER) MISC     glucagon 1 MG injection     insulin lispro (HUMALOG) 100 UNIT/ML Cartridge     insulin lispro (HUMALOG) 100 UNIT/ML Cartridge     insulin pen needle 30G X 5 MM     Wound Dressing Adhesive (MASTISOL ADHESIVE) LIQD     No current facility-administered medications for this visit.      Social history is significant for being a .  She will  be working from home this fall.    On exam she is in no acute distress.  Her mood is upbeat.  She is without periorbital edema.  Cranial nerves are grossly intact.        Recent Labs   Lab Test 02/03/20  1750 02/03/20  1745 02/03/20 08/07/19 01/24/19  0831 01/24/19  0821  11/23/16  0931  05/28/15  2129   A1C  --   --   --   --   --   --   --   --   --  14.0*   HEMOGLOBINA1  --   --  7.3* 6.3*  --   --    < >  --    < >  --    TSH  --   --   --   --   --  0.96  --  1.05  --   --    T4  --   --   --   --   --   --   --  1.05  --   --    LDL  --   --   --   --   --  67  --  86  --   --    HDL  --   --   --   --   --  78  --  76  --   --    TRIG  --   --   --   --   --  36  --  40  --   --    CR  --  0.78  --   --   --  0.85  --  0.88   < > 0.64   MICROL 17  --   --   --  7  --    < >  --    < >  --     < > = values in this interval not displayed.     Assessment and plan:    1.  Diabetes control.  Overall she is doing very well.  She is very happy with her overall level of control.  I think it is possible her carb ratio is a little bit aggressive and I recommended she change it to 20.  She will consider that.  No other changes were made in the pump settings.    2.  Diabetes complications.  She is only had diabetes for 5 years.  Her renal function is normal.  She has no problems with her feet.  She will see her eye doctor in the next few months.    3.CVD risk.  Cholesterol has been checked in the past and is in target.  Blood pressure was normal last time.    Follow-up 6 months.    I spent 20 minutes with her more than 50% of which was spent counseling about diabetes management.    Josiane Cantrell MD

## 2020-08-11 NOTE — PROGRESS NOTES
"Mallory Barajas is a 30 year old female who is being evaluated via a billable video visit.      The patient has been notified of following:     \"This video visit will be conducted via a call between you and your physician/provider. We have found that certain health care needs can be provided without the need for an in-person physical exam.  This service lets us provide the care you need with a video conversation.  If a prescription is necessary we can send it directly to your pharmacy.  If lab work is needed we can place an order for that and you can then stop by our lab to have the test done at a later time.    Video visits are billed at different rates depending on your insurance coverage.  Please reach out to your insurance provider with any questions.    If during the course of the call the physician/provider feels a video visit is not appropriate, you will not be charged for this service.\"    Patient has given verbal consent for Video visit? Yes  How would you like to obtain your AVS? Mail a copy  If you are dropped from the video visit, the video invite should be resent to: Send to e-mail at: [email or web address]  Will anyone else be joining your video visit? No        Video-Visit Details    Type of service:  Video Visit    Video Start Time:10:50  Video End Time: 11:11 AM    Originating Location (pt. Location): Home    Distant Location (provider location):  [facility] ENDOCRINOLOGY     Platform used for Video Visit: Gabe Cantrell MD      Mallory Barajas is a 30 year old female with type 1 diabetes mellitus.  Video visit today for diabetes follow up.  Pt was dx having type 1 diabetes mellitus in May 2015.     Mallory is currently taking using a Tandem insulin pump- control IQ and DexcomG6 sensor.  Over the last month she used the control IQ 86% of the time.  Her current basal insulin rates are:  Midnight= 0.3 units/hr.  6:30 am = 0.150 units/hr.  9 pm= 0.3 units/hr.  Her 24 hr basal insulin dose is low at " 5.03 units/24 hrs.  Her I/C ratio is 1:15 and sensitivity 50.  Target is 110    She boluses 8.33 times a day on average.    Over the last month she wore the cgm 97% of the time.  She was in range 89% of the time, above range 5% of the time, 54-70 4% of the time, and < 54 1% of the time.  Calculated A1c was 6.2%.  Average sensor glucose is 120.    Reviewing the daily information shows that she frequently is low after meals.  She recovers from this very easily and stays in target most of the time.  She is in target overnight.  She reports she does exercise pretty much daily.  When she does more aerobic exercise, she disconnects the pump.  When she goes for a walk she does not do anything.  She has not had any trouble recognizing her hypoglycemia.    She has not seen the eye doctor yet this year but has no visual complaints.  Her feet are fine.  She has normal menses.      Current Outpatient Medications   Medication     acetone, Urine, test STRP     blood glucose (ACCU-CHEK MACARIO PLUS) test strip     blood glucose monitoring (ACCU-CHEK MACARIO PLUS) meter device kit     Continuous Blood Gluc  (DEXCOM G6 ) GHADA     Continuous Blood Gluc  (DEXCOM G6 ) GHADA     Continuous Blood Gluc Sensor (DEXCOM G6 SENSOR) MISC     Continuous Blood Gluc Transmit (DEXCOM G6 TRANSMITTER) MISC     glucagon 1 MG injection     insulin lispro (HUMALOG) 100 UNIT/ML Cartridge     insulin lispro (HUMALOG) 100 UNIT/ML Cartridge     insulin pen needle 30G X 5 MM     Wound Dressing Adhesive (MASTISOL ADHESIVE) LIQD     No current facility-administered medications for this visit.      Social history is significant for being a .  She will be working from home this fall.    On exam she is in no acute distress.  Her mood is upbeat.  She is without periorbital edema.  Cranial nerves are grossly intact.        Recent Labs   Lab Test 02/03/20  1750 02/03/20  1745 02/03/20 08/07/19 01/24/19  0831 01/24/19  0821   11/23/16  0931  05/28/15  2129   A1C  --   --   --   --   --   --   --   --   --  14.0*   HEMOGLOBINA1  --   --  7.3* 6.3*  --   --    < >  --    < >  --    TSH  --   --   --   --   --  0.96  --  1.05  --   --    T4  --   --   --   --   --   --   --  1.05  --   --    LDL  --   --   --   --   --  67  --  86  --   --    HDL  --   --   --   --   --  78  --  76  --   --    TRIG  --   --   --   --   --  36  --  40  --   --    CR  --  0.78  --   --   --  0.85  --  0.88   < > 0.64   MICROL 17  --   --   --  7  --    < >  --    < >  --     < > = values in this interval not displayed.     Assessment and plan:    1.  Diabetes control.  Overall she is doing very well.  She is very happy with her overall level of control.  I think it is possible her carb ratio is a little bit aggressive and I recommended she change it to 20.  She will consider that.  No other changes were made in the pump settings.    2.  Diabetes complications.  She is only had diabetes for 5 years.  Her renal function is normal.  She has no problems with her feet.  She will see her eye doctor in the next few months.    3.CVD risk.  Cholesterol has been checked in the past and is in target.  Blood pressure was normal last time.    Follow-up 6 months.    I spent 20 minutes with her more than 50% of which was spent counseling about diabetes management.    Josiane Cantrell MD

## 2020-08-17 ENCOUNTER — OFFICE VISIT (OUTPATIENT)
Dept: OPHTHALMOLOGY | Facility: CLINIC | Age: 30
End: 2020-08-17
Payer: COMMERCIAL

## 2020-08-17 DIAGNOSIS — E10.9 TYPE 1 DIABETES MELLITUS WITHOUT RETINOPATHY (H): Primary | ICD-10-CM

## 2020-08-17 DIAGNOSIS — H52.13 MYOPIA OF BOTH EYES: ICD-10-CM

## 2020-08-17 ASSESSMENT — VISUAL ACUITY
OD_SC: 20/20
OS_SC+: -2
OS_SC: 20/20
METHOD: SNELLEN - LINEAR

## 2020-08-17 ASSESSMENT — EXTERNAL EXAM - LEFT EYE: OS_EXAM: NORMAL

## 2020-08-17 ASSESSMENT — CUP TO DISC RATIO
OS_RATIO: 0.2
OD_RATIO: 0.2

## 2020-08-17 ASSESSMENT — TONOMETRY
OS_IOP_MMHG: 14
OD_IOP_MMHG: 14
IOP_METHOD: ICARE

## 2020-08-17 ASSESSMENT — REFRACTION_MANIFEST
OS_CYLINDER: SPHERE
OD_SPHERE: -0.25
OD_CYLINDER: SPHERE
OS_SPHERE: -0.50

## 2020-08-17 ASSESSMENT — CONF VISUAL FIELD
METHOD: COUNTING FINGERS
OS_NORMAL: 1
OD_NORMAL: 1

## 2020-08-17 ASSESSMENT — EXTERNAL EXAM - RIGHT EYE: OD_EXAM: NORMAL

## 2020-08-17 ASSESSMENT — SLIT LAMP EXAM - LIDS
COMMENTS: NORMAL
COMMENTS: NORMAL

## 2020-08-17 NOTE — PROGRESS NOTES
Assessment/Plan  (E10.9) Type 1 diabetes mellitus without retinopathy (H)  (primary encounter diagnosis)  Comment: Last HbA1c: 6.3%  Plan: Discussed findings with patient. Encouraged continued blood glucose control. Continue following treatment recommendations of endocrinology/PCP.     (H52.13) Myopia of both eyes  Plan: REFRACTION [93234]        Minimal Rx. Monitor.       Complete documentation of historical and exam elements from today's encounter can  be found in the full encounter summary report (not reduplicated in this progress  note). I personally obtained the chief complaint(s) and history of present illness. I  confirmed and edited as necessary the review of systems, past medical/surgical  history, family history, social history, and examination findings as documented by  others; and I examined the patient myself. I personally reviewed the relevant tests,  images, and reports as documented above. I formulated and edited as necessary the  assessment and plan and discussed the findings and management plan with the  patient and family.    Rubens Sigala, OD

## 2020-08-17 NOTE — NURSING NOTE
Chief Complaints and History of Present Illnesses   Patient presents with     Diabetic Eye Exam     Chief Complaint(s) and History of Present Illness(es)     Diabetic Eye Exam     Vision: is stable    Associated symptoms: Negative for blurred vision    Diabetes Type: Type 1    Duration: 5 years    Blood Sugars: is controlled    Treatments tried: no treatments    Pain scale: 0/10              Comments     Type I diabetes, No vision changes. No pain. Last A1c 6.3%    Lab Results       Component                Value               Date                       A1C                      14.0                05/28/2015              Padao Reed, COT COT 1:21 PM August 17, 2020

## 2020-10-29 DIAGNOSIS — E10.9 TYPE 1 DIABETES MELLITUS WITHOUT COMPLICATION (H): Primary | ICD-10-CM

## 2020-10-29 RX ORDER — INSULIN LISPRO 100 [IU]/ML
INJECTION, SOLUTION INTRAVENOUS; SUBCUTANEOUS
Qty: 36 ML | Refills: 3 | Status: SHIPPED | OUTPATIENT
Start: 2020-10-29 | End: 2022-11-01

## 2020-12-27 ENCOUNTER — HEALTH MAINTENANCE LETTER (OUTPATIENT)
Age: 30
End: 2020-12-27

## 2021-01-12 ENCOUNTER — TELEPHONE (OUTPATIENT)
Dept: ENDOCRINOLOGY | Facility: CLINIC | Age: 31
End: 2021-01-12

## 2021-01-12 NOTE — TELEPHONE ENCOUNTER
Prior Authorization Retail Medication Request    Medication/Dose: Dexcom transmitter  ICD code (if different than what is on RX):E10.9  Rationale: Patient needs to communicate with diabetic devices     Insurance Name:[employer]   Insurance ID: [id number]       Pharmacy Information (if different than what is on RX)  Name:   Phone:

## 2021-01-13 NOTE — TELEPHONE ENCOUNTER
PA Initiation    Medication: Dexcom transmitter -   Insurance Company: [employer] ([facility]) - Phone [phone] Fax [phone]  Pharmacy Filling the Rx: [facility] #[id number] - [place] [address] [address] [facility] [address]  Filling Pharmacy Phone: [phone]  Filling Pharmacy Fax: [phone]  Start Date: 1/13/2021

## 2021-01-14 ENCOUNTER — TELEPHONE (OUTPATIENT)
Dept: ENDOCRINOLOGY | Facility: CLINIC | Age: 31
End: 2021-01-14

## 2021-01-14 NOTE — TELEPHONE ENCOUNTER
[facility] Call Center    Phone Message    May a detailed message be left on voicemail: yes     Reason for Call:     Due to new insurance pt will need prior auth for dexcom g6 sensor and transmitter. Pt not sure if she needs an auth for the pump supplies. Insurance in chart updated.     Action Taken: Message routed to:  [facility] endo    Travel Screening: Not Applicable

## 2021-01-19 NOTE — TELEPHONE ENCOUNTER
Prior Authorization Approval    Medication: Dexcom transmitter - APPROVED was approved on 1/15/2021  Effective:   to 1/13/2022  Reference #:    Approved Dose/Quantity:   Insurance Company: [employer] ([facility]) - Phone [phone] Fax [phone]  Expected CoPay:    Pharm[facility]illing the Rx: [id number] - [place], [place] - [address] [place] [address] AT [address]  Pharmacy Notified: Yes  Patient Notified: Comment:  **Instructed pharmacy to notify patient when script is ready to /ship.**

## 2021-02-11 DIAGNOSIS — E10.9 TYPE 1 DIABETES MELLITUS WITHOUT COMPLICATION (H): Primary | ICD-10-CM

## 2021-02-11 RX ORDER — BISMUTH SUBSALICYLATE 262MG/15ML
SUSPENSION, ORAL (FINAL DOSE FORM) ORAL
Qty: 540 EACH | Refills: 3 | Status: SHIPPED | OUTPATIENT
Start: 2021-02-11

## 2021-02-18 ENCOUNTER — MEDICAL CORRESPONDENCE (OUTPATIENT)
Dept: HEALTH INFORMATION MANAGEMENT | Facility: CLINIC | Age: 31
End: 2021-02-18

## 2021-03-19 ENCOUNTER — MYC MEDICAL ADVICE (OUTPATIENT)
Dept: ENDOCRINOLOGY | Facility: CLINIC | Age: 31
End: 2021-03-19

## 2021-04-26 DIAGNOSIS — E10.9 TYPE 1 DIABETES MELLITUS WITHOUT COMPLICATION (H): ICD-10-CM

## 2021-04-27 RX ORDER — PROCHLORPERAZINE 25 MG/1
SUPPOSITORY RECTAL
Qty: 3 EACH | Refills: 1 | Status: SHIPPED | OUTPATIENT
Start: 2021-04-27 | End: 2021-07-09

## 2021-05-04 ENCOUNTER — MYC MEDICAL ADVICE (OUTPATIENT)
Dept: ENDOCRINOLOGY | Facility: CLINIC | Age: 31
End: 2021-05-04

## 2021-05-04 DIAGNOSIS — E10.9 TYPE 1 DIABETES MELLITUS WITHOUT COMPLICATION (H): ICD-10-CM

## 2021-05-04 RX ORDER — PROCHLORPERAZINE 25 MG/1
SUPPOSITORY RECTAL
Qty: 1 EACH | Refills: 3 | Status: SHIPPED | OUTPATIENT
Start: 2021-05-04 | End: 2022-05-13

## 2021-07-07 DIAGNOSIS — E10.9 TYPE 1 DIABETES MELLITUS WITHOUT COMPLICATION (H): ICD-10-CM

## 2021-07-09 RX ORDER — PROCHLORPERAZINE 25 MG/1
SUPPOSITORY RECTAL
Qty: 3 EACH | Refills: 5 | Status: SHIPPED | OUTPATIENT
Start: 2021-07-09 | End: 2021-09-15

## 2021-08-08 ENCOUNTER — HEALTH MAINTENANCE LETTER (OUTPATIENT)
Age: 31
End: 2021-08-08

## 2021-09-15 DIAGNOSIS — E10.9 TYPE 1 DIABETES MELLITUS WITHOUT COMPLICATION (H): ICD-10-CM

## 2021-09-15 RX ORDER — PROCHLORPERAZINE 25 MG/1
SUPPOSITORY RECTAL
Qty: 3 EACH | Refills: 0 | Status: SHIPPED | OUTPATIENT
Start: 2021-09-15 | End: 2021-12-18

## 2021-09-15 NOTE — TELEPHONE ENCOUNTER
Continuous Blood Gluc Sensor (DEXCOM G6 SENSOR) MISC  Change every 10 days.      Last Written Prescription Date:  7/9/21  Last Fill Quantity: 3 each,   # refills: 5  Last Office Visit : 8/11/20  Future Office visit: none    Routing refill request to provider for review/approval because:    Refills by Clinic RN.

## 2021-10-03 ENCOUNTER — HEALTH MAINTENANCE LETTER (OUTPATIENT)
Age: 31
End: 2021-10-03

## 2021-11-23 DIAGNOSIS — E10.9 DIABETES MELLITUS TYPE 1 (H): ICD-10-CM

## 2021-11-23 RX ORDER — INSULIN LISPRO 100 [IU]/ML
INJECTION, SOLUTION INTRAVENOUS; SUBCUTANEOUS
Qty: 30 ML | Refills: 1 | Status: SHIPPED | OUTPATIENT
Start: 2021-11-23 | End: 2022-09-27

## 2021-12-18 DIAGNOSIS — E10.9 TYPE 1 DIABETES MELLITUS WITHOUT COMPLICATION (H): ICD-10-CM

## 2021-12-18 RX ORDER — PROCHLORPERAZINE 25 MG/1
SUPPOSITORY RECTAL
Qty: 9 EACH | Refills: 0 | Status: SHIPPED | OUTPATIENT
Start: 2021-12-18 | End: 2022-03-18

## 2021-12-18 NOTE — TELEPHONE ENCOUNTER
"Continuous Blood Gluc Sensor (DEXCOM G6 SENSOR) [facility]    8/11/2020  [facility] Endocrinology     Josiane Cantrell MD    Internal Medicine     \" Follow-up 6 months\"     Nv: 12/22/21  "

## 2021-12-21 NOTE — PROGRESS NOTES
Outcome for 12/21/21 9:05 AM: Data emailed to provider    Mallory Barajas  is being evaluated via a billable video visit.      How would you like to obtain your AVS? MyChart  For the video visit, send the invitation by: Send to e-mail at: [email or web address]  Will anyone else be joining your video visit? No

## 2021-12-22 ENCOUNTER — VIRTUAL VISIT (OUTPATIENT)
Dept: ENDOCRINOLOGY | Facility: CLINIC | Age: 31
End: 2021-12-22
Payer: COMMERCIAL

## 2021-12-22 ENCOUNTER — TELEPHONE (OUTPATIENT)
Dept: ENDOCRINOLOGY | Facility: CLINIC | Age: 31
End: 2021-12-22

## 2021-12-22 DIAGNOSIS — E10.9 TYPE 1 DIABETES MELLITUS WITHOUT COMPLICATION (H): Primary | ICD-10-CM

## 2021-12-22 PROCEDURE — 99214 OFFICE O/P EST MOD 30 MIN: CPT | Mod: GT | Performed by: PHYSICIAN ASSISTANT

## 2021-12-22 PROCEDURE — 95251 CONT GLUC MNTR ANALYSIS I&R: CPT | Mod: GT | Performed by: PHYSICIAN ASSISTANT

## 2021-12-22 RX ORDER — INSULIN GLARGINE 100 [IU]/ML
INJECTION, SOLUTION SUBCUTANEOUS
Qty: 10 ML | Refills: 1 | Status: SHIPPED | OUTPATIENT
Start: 2021-12-22 | End: 2023-02-24

## 2021-12-22 NOTE — TELEPHONE ENCOUNTER
Attempted to reach patient to schedule follow up in the Endocrinology Clinic. No answer, LM on VM to call office back.    Schedule with Josiane Cantrell MD in 6 month.

## 2021-12-22 NOTE — LETTER
12/22/2021       RE: Mallory Barajas  [address] [facility] [address]     Dear Colleague,    Thank you for referring your patient, Mallory Barajas, to the [facility]OCRINOLOG[facility]. Please see a copy of my visit note below.    Outcome for 12/21/21 9:05 AM: Data emailed to provider    Mallory Barajas  is being evaluated via a billable video visit.      How would you like to obtain your AVS? MyChart  For the video visit, send the invitation by: Send to e-mail at: [email or web address]  Will anyone else be joining your video visit? No      Due to the COVID 19 pandemic this visit was converted to a video visit in order to help prevent spread of infection in this patient and the general population.    Time of start: 7:52 am   Time of end: 8:09 am  Total duration of video visit:16 minutes.    HPI  Mallory Barajas is a 31 year old female with type 1 diabetes mellitus.  Video visit today for diabetes follow up.  Pt had a virtual visit with Dr. Cantrell in Aug 2020.  Pt was dx having type 1 diabetes mellitus in May 2015. No hx of retinopathy, nephropathy or neuropathy.  Mallory is currently using a Tandem insulin pump- control IQ and DexcomG6 sensor.  Her current basal insulin rate is set at 0.25 units/hr. Her 24 hr basal insulin dose is low at 6 units/24 hrs.  Her I/C ratio is 1:15 and CF 50.  Most recent A1C was 6.2 % on 11/24/20221.  I do not have her Tandem insulin pump data at this time.  I did review her DexcomG6 sensor data and scanned this data in her note below.  Her average glucose is 128 with SD 42 and estimated A1C 6.4 % for the past 14 days.  Her blood sugar is in target 80 % of the time, above target 13 % of the time and below target 7 % of the time.  She has been having low blood sugars following bolus insulin.   Mallory is able to recognize and self treat her hypoglycemia.  On ROS today, Mallory eats healthy and is very active with sports. She  continues to teach and .  She denies frequent headaches, blurred vision, n/v, SOB at rest or cough. No chest pain, abd pain, diarrhea, dysuria or hematuria.  She denies numbness or tingling in her feet or hands.  Mallory denies any foot ulcers at this time.  Pt received the COVID vaccines and COVID booster.    DIABETES CARE:   Retinopathy: none; seen here by Oph in Aug 2020 without retinopathy.  Nephropathy: none; urine microalbuminuria negative in Nov 2021.  Neuropathy: none.  Foot exam: no exam today.  Lipids: LDL 67 in Jan 2019.  CAD: no.  Mental mera: no depression.  Insulin: Tandem insulin pump- control IQ.  Testing: DexcomG6 sensor.  Hypoglycemia: has Baqsimi at home and work- she will check expiration dates.  Back up insulin: RX for Lantus vial/syringes sent to pharmacy today to use in case of insulin pump failure.          ROS  Please see under HPI.    Allergies  No Known Allergies    MEDICATIONS:  Reviewed with pt today.    Family History  Mother with hx of type 2 diabetes.  First cousin with hx of type 1 diabetes.    Social History  Smoke: none.  ETOH: rare.  Single and no children.  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    Past Medical History  Past Medical History:   Diagnosis Date     NO ACTIVE PROBLEMS (aka NONE)      Past Surgical History:   Procedure Laterality Date     ARTHROSCOPY KNEE BILATERAL         Physical Exam    No exam today.    RESULTS  Creatinine   Date Value Ref Range Status   02/03/2020 0.78 0.52 - 1.04 mg/dL Final     GFR Estimate   Date Value Ref Range Status   02/03/2020 >90 >60 mL/min/[1.73_m2] Final     Comment:     Non  GFR Calc  Starting 12/18/2018, serum creatinine based estimated GFR (eGFR) will be   calculated using the Chronic Kidney Disease Epidemiology Collaboration   (CKD-EPI) equation.       Hemoglobin A1C POCT   Date Value Ref Range Status   05/28/2015 14.0 (H) 4.3 - 6.0 % Final     Potassium   Date Value Ref Range  Status   05/29/2015 3.5 3.4 - 5.3 mmol/L Final     ALT   Date Value Ref Range Status   01/24/2019 13 0 - 50 U/L Final     AST   Date Value Ref Range Status   01/24/2019 10 0 - 45 U/L Final     TSH   Date Value Ref Range Status   01/24/2019 0.96 0.40 - 4.00 mU/L Final     T4 Free   Date Value Ref Range Status   11/23/2016 1.05 0.76 - 1.46 ng/dL Final     A1C      6.2    11/24/2021  A1C      6.3    8/7/2019  A1C      6.9    1/17/2019  A1C      6.5    7/31/2018  A1C      6.5    3/9/2018  A1C      6.3    7/14/2017  A1C      6.2    3/6/2017  A1C      5.7    11/23/2016  A1C      6.4    8/22/2016  A1C      6.5    1/13/2016  A1C      7.3    8/20/2015  A1C      7.2    8/7/2015.  A1C     14.0   5/28/2015    ASSESSMENT/PLAN:    1. TYPE 1 DIABETES MELLITUS: Mallory continues to do a good job with her diabetes care.  She has been having low blood sugars following insulin bolus.  I had her change her I/C ratio to 1:18 today ( was 1:15 ) and if she continues to have low blood sugars following bolus insulin, she was instructed to change her I/C ratio to 1:20.  She continues to eat healthy and remains active.  Pt was seen by Oph here in Aug 2020 with no retinopathy. No visual complaints today.  Pt's urine microalbuminuria negative in Nov 2021. Creat and GFR normal 11/20221.  Mallory denies sx of diabetic peripheral neuropathy or foot ulcers at this time.  Her LDL was 67 with HDL 78 in Jan 2019.  No vitals today.  Mallory received the COVID vaccines and COVID booster.  She also received the flu vaccine this Fall.    2.  FOLLOW UP: With Dr Cantrell in 6 months.    Time spent reviewing chart, labs and DexcomG6 sensor data today = 10 minutes.  Time for video visit today= 16 minutes.  Time for documentation today = 15 minutes    TOTAL TIME FOR VISIT TODAY = 41 minutes.    Answers for HPI/ROS submitted by the patient on 12/16/2021  General Symptoms: No  Skin Symptoms: No  HENT Symptoms: No  EYE SYMPTOMS: No  HEART SYMPTOMS: No  LUNG SYMPTOMS:  No  INTESTINAL SYMPTOMS: No  URINARY SYMPTOMS: No  GYNECOLOGIC SYMPTOMS: No  BREAST SYMPTOMS: No  SKELETAL SYMPTOMS: No  BLOOD SYMPTOMS: No  NERVOUS SYSTEM SYMPTOMS: No  MENTAL HEALTH SYMPTOMS: No          Again, thank you for allowing me to participate in the care of your patient.      Sincerely,    Sara Larry PA-C

## 2021-12-22 NOTE — PROGRESS NOTES
Due to the COVID 19 pandemic this visit was converted to a video visit in order to help prevent spread of infection in this patient and the general population.    Time of start: 7:52 am   Time of end: 8:09 am  Total duration of video visit:16 minutes.    HPI  Mallory Barajas is a 31 year old female with type 1 diabetes mellitus.  Video visit today for diabetes follow up.  Pt had a virtual visit with Dr. Cantrell in Aug 2020.  Pt was dx having type 1 diabetes mellitus in May 2015. No hx of retinopathy, nephropathy or neuropathy.  Mallory is currently using a Tandem insulin pump- control IQ and DexcomG6 sensor.  Her current basal insulin rate is set at 0.25 units/hr. Her 24 hr basal insulin dose is low at 6 units/24 hrs.  Her I/C ratio is 1:15 and CF 50.  Most recent A1C was 6.2 % on 11/24/20221.  I do not have her Tandem insulin pump data at this time.  I did review her DexcomG6 sensor data and scanned this data in her note below.  Her average glucose is 128 with SD 42 and estimated A1C 6.4 % for the past 14 days.  Her blood sugar is in target 80 % of the time, above target 13 % of the time and below target 7 % of the time.  She has been having low blood sugars following bolus insulin.   Mallory is able to recognize and self treat her hypoglycemia.  On ROS today, Mallory eats healthy and is very active with sports. She continues to teach and .  She denies frequent headaches, blurred vision, n/v, SOB at rest or cough. No chest pain, abd pain, diarrhea, dysuria or hematuria.  She denies numbness or tingling in her feet or hands.  Mallory denies any foot ulcers at this time.  Pt received the COVID vaccines and COVID booster.    DIABETES CARE:   Retinopathy: none; seen here by Oph in Aug 2020 without retinopathy.  Nephropathy: none; urine microalbuminuria negative in Nov 2021.  Neuropathy: none.  Foot exam: no exam today.  Lipids: LDL 67 in Jan 2019.  CAD: no.  Mental mera: no depression.  Insulin: Tandem insulin pump- control  IQ.  Testing: DexcomG6 sensor.  Hypoglycemia: has Baqsimi at home and work- she will check expiration dates.  Back up insulin: RX for Lantus vial/syringes sent to pharmacy today to use in case of insulin pump failure.          ROS  Please see under HPI.    Allergies  No Known Allergies    MEDICATIONS:  Reviewed with pt today.    Family History  Mother with hx of type 2 diabetes.  First cousin with hx of type 1 diabetes.    Social History  Smoke: none.  ETOH: rare.  Single and no children.  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    Past Medical History  Past Medical History:   Diagnosis Date     NO ACTIVE PROBLEMS (aka NONE)      Past Surgical History:   Procedure Laterality Date     ARTHROSCOPY KNEE BILATERAL         Physical Exam    No exam today.    RESULTS  Creatinine   Date Value Ref Range Status   02/03/2020 0.78 0.52 - 1.04 mg/dL Final     GFR Estimate   Date Value Ref Range Status   02/03/2020 >90 >60 mL/min/[1.73_m2] Final     Comment:     Non  GFR Calc  Starting 12/18/2018, serum creatinine based estimated GFR (eGFR) will be   calculated using the Chronic Kidney Disease Epidemiology Collaboration   (CKD-EPI) equation.       Hemoglobin A1C POCT   Date Value Ref Range Status   05/28/2015 14.0 (H) 4.3 - 6.0 % Final     Potassium   Date Value Ref Range Status   05/29/2015 3.5 3.4 - 5.3 mmol/L Final     ALT   Date Value Ref Range Status   01/24/2019 13 0 - 50 U/L Final     AST   Date Value Ref Range Status   01/24/2019 10 0 - 45 U/L Final     TSH   Date Value Ref Range Status   01/24/2019 0.96 0.40 - 4.00 mU/L Final     T4 Free   Date Value Ref Range Status   11/23/2016 1.05 0.76 - 1.46 ng/dL Final     A1C      6.2    11/24/2021  A1C      6.3    8/7/2019  A1C      6.9    1/17/2019  A1C      6.5    7/31/2018  A1C      6.5    3/9/2018  A1C      6.3    7/14/2017  A1C      6.2    3/6/2017  A1C      5.7    11/23/2016  A1C      6.4    8/22/2016  A1C      6.5     1/13/2016  A1C      7.3    8/20/2015  A1C      7.2    8/7/2015.  A1C     14.0   5/28/2015    ASSESSMENT/PLAN:    1. TYPE 1 DIABETES MELLITUS: Mallory continues to do a good job with her diabetes care.  She has been having low blood sugars following insulin bolus.  I had her change her I/C ratio to 1:18 today ( was 1:15 ) and if she continues to have low blood sugars following bolus insulin, she was instructed to change her I/C ratio to 1:20.  She continues to eat healthy and remains active.  Pt was seen by Oph here in Aug 2020 with no retinopathy. No visual complaints today.  Pt's urine microalbuminuria negative in Nov 2021. Creat and GFR normal 11/20221.  Mallory denies sx of diabetic peripheral neuropathy or foot ulcers at this time.  Her LDL was 67 with HDL 78 in Jan 2019.  No vitals today.  Mallory received the COVID vaccines and COVID booster.  She also received the flu vaccine this Fall.    2.  FOLLOW UP: With Dr Cantrell in 6 months.    Time spent reviewing chart, labs and DexcomG6 sensor data today = 10 minutes.  Time for video visit today= 16 minutes.  Time for documentation today = 15 minutes    TOTAL TIME FOR VISIT TODAY = 41 minutes.    Sara Larry PA-C    Answers for HPI/ROS submitted by the patient on 12/16/2021  General Symptoms: No  Skin Symptoms: No  HENT Symptoms: No  EYE SYMPTOMS: No  HEART SYMPTOMS: No  LUNG SYMPTOMS: No  INTESTINAL SYMPTOMS: No  URINARY SYMPTOMS: No  GYNECOLOGIC SYMPTOMS: No  BREAST SYMPTOMS: No  SKELETAL SYMPTOMS: No  BLOOD SYMPTOMS: No  NERVOUS SYSTEM SYMPTOMS: No  MENTAL HEALTH SYMPTOMS: No

## 2022-01-23 ENCOUNTER — HEALTH MAINTENANCE LETTER (OUTPATIENT)
Age: 32
End: 2022-01-23

## 2022-02-07 ENCOUNTER — TELEPHONE (OUTPATIENT)
Dept: ENDOCRINOLOGY | Facility: CLINIC | Age: 32
End: 2022-02-07
Payer: COMMERCIAL

## 2022-02-07 NOTE — TELEPHONE ENCOUNTER
Prior Authorization Retail Medication Request     Medication/Dose: Dexcom G6 transmitter. 1 every 90 days.   ICD code (if different than what is on RX):E10.9  Rationale: Patient needs to communicate with diabetic devices      Insurance Name:  Insurance ID:         Pharmacy Information (if different than what is on RX)  Name:   Phone:

## 2022-02-10 NOTE — TELEPHONE ENCOUNTER
Central Prior Authorization Team   Phone: [phone]      PA Initiation    Medication: Dexcom G6 transmitter -PA initiated  Insurance Company: [employer] ([facility]) - Phone [phone] Fax [phone]  Pharmacy Filling the Rx: [facility] - [facility] [id number] [facility], [address]  Filling Pharmacy Phone: [phone]  Filling Pharmacy Fax:    Start Date: 2/10/2022

## 2022-03-17 DIAGNOSIS — E10.9 TYPE 1 DIABETES MELLITUS WITHOUT COMPLICATION (H): ICD-10-CM

## 2022-03-18 RX ORDER — PROCHLORPERAZINE 25 MG/1
SUPPOSITORY RECTAL
Qty: 9 EACH | Refills: 3 | Status: SHIPPED | OUTPATIENT
Start: 2022-03-18 | End: 2023-03-17

## 2022-03-18 NOTE — TELEPHONE ENCOUNTER
Continuous Blood Gluc Sensor (DEXCOM G6 SENSOR) [facility] [place]    12/22/2021  [facility] Sara Davenport PA-C    Endocrinology, Diabetes, and Metabolism

## 2022-03-20 ENCOUNTER — HEALTH MAINTENANCE LETTER (OUTPATIENT)
Age: 32
End: 2022-03-20

## 2022-03-29 DIAGNOSIS — E10.9 TYPE 1 DIABETES MELLITUS WITHOUT COMPLICATION (H): ICD-10-CM

## 2022-03-31 RX ORDER — GLUCAGON 3 MG/1
1 POWDER NASAL PRN
Qty: 2 EACH | Refills: 3 | Status: SHIPPED | OUTPATIENT
Start: 2022-03-31 | End: 2024-07-11

## 2022-03-31 NOTE — TELEPHONE ENCOUNTER
Glucagon (BAQSIMI TWO PACK) 3 MG/DOSE POWD  Last Written Prescription Date:  8/11/20  Last Fill Quantity: 2,   # refills: 3  Last Office Visit : 12/22/21  Future Office visit:  9/27/22    Routing refill request to provider for review/approval because: not on protocol

## 2022-05-12 DIAGNOSIS — E10.9 TYPE 1 DIABETES MELLITUS WITHOUT COMPLICATION (H): ICD-10-CM

## 2022-05-13 RX ORDER — PROCHLORPERAZINE 25 MG/1
SUPPOSITORY RECTAL
Qty: 1 EACH | Refills: 2 | Status: SHIPPED | OUTPATIENT
Start: 2022-05-13 | End: 2023-03-21

## 2022-05-13 NOTE — TELEPHONE ENCOUNTER
Continuous Blood Gluc Transmit (DEXCOM G6 TRANSMITTER) [facility] [place]    12/22/2021  [facility] [place]     Sara Larry PA-C    Endocrinology, Diabetes, and Metabolism

## 2022-09-11 ENCOUNTER — HEALTH MAINTENANCE LETTER (OUTPATIENT)
Age: 32
End: 2022-09-11

## 2022-09-23 NOTE — PROGRESS NOTES
Outcome for 09/23/22 11:54 AM :Patient is sharing data via clinic device website and patient has been instructed to update data on website. Find login information by using .[email or web address]    Dexcom connected waiting on Tandem data   Dorys Johnson CMA    Outcome for 09/26/22 10:45 AM :Glucose sent via Email   Marian Logan Barajas is a 32 ear old female with type 1 diabetes mellitus.  Pt was dx having type 1 diabetes mellitus in May 2015.     Mallory is currently taking using a Tandem insulin pump- control IQ and DexcomG6 sensor.  Her Dexcom data are below.    Mallory recognizes that she is having several episodes of hypoglycemia.  She reports that she tends to enter more grams of carbs into her pump than she actually ends up eating and this often causes her to be low.  She also will consistently bolus 15 minutes before she plans to eat, even if she does not know that she will be able to eat at that time.  She has had some troubling episodes because of this behavior.  Specifically, she became very symptomatic when lunch was delayed by a shopping trip at 1 point this summer and when the line for getting food turned out to be longer than she expected at a bakery.  She also had an episode of severe hypoglycemia that happened at home.  This was recognized by her fiancé who called the paramedics.  Mallory was treated with IV glucose and did not need to go to the hospital.  They do have Baqsimi on hand.  They discussed how she should use that next time.    When asked why Mallory is so aggressive with her insulin dosing, she says she does worry a lot about hyperglycemia and the impact this will have on her body as she ages.  She always feels as if she can just eat something to keep her self from getting too low and therefore over bolusing is not really a problem.  She is beginning to think that perhaps this is not the correct approach.    She continues to be very active.  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  She usually just removes her pump when she is exercising.    Data collected from the pump shows that 75% of her insulin is delivered as a food bolus.  15% of her daily insulin is given as basal.  Correction but plus food boluses constitute 21% of her boluses and correction only boluses constitute 1% of her boluses.  Her total daily insulin dose is 32 units.  She gives on average 13 boluses a day.  The download says she had averages 423 g of carb each day.  When I looked at the grams entered on a daily basis, I can see where that average comes from although Mallory herself does not think she eats that many carbs.    Her pump settings are midnight basal 0.25, correction 1 for 50, carb ratio 1 for 18, and target 100.    She has not seen her eye doctor in over a year but has no visual concerns.  She denies having any paresthesias.  She denies chest pain or shortness of breath.  She has no GI symptoms.  Her menstrual cycle is regular.               0.25 a                        Current Outpatient Medications   Medication     acetone, Urine, test STRP     blood glucose (ACCU-CHEK MACARIO PLUS) test strip     blood glucose monitoring (ACCU-CHEK MACARIO PLUS) meter device kit     blood glucose monitoring (NO BRAND SPECIFIED) meter device kit     blood glucose monitoring (ULTRA THIN 30G) lancets     Continuous Blood Gluc  (DEXCOM G6 ) GHADA     Continuous Blood Gluc Sensor (DEXCOM G6 SENSOR) MISC     Continuous Blood Gluc Transmit (DEXCOM G6 TRANSMITTER) MISC     Glucagon (BAQSIMI TWO PACK) 3 MG/DOSE POWD     insulin glargine (LANTUS VIAL) 100 UNIT/ML vial     insulin lispro (HUMALOG) 100 UNIT/ML Cartridge     insulin pen needle 30G X 5 MM     insulin syringes, disposable, U-100 0.3 ML MISC     Wound Dressing Adhesive (MASTISOL ADHESIVE) LIQD     No current facility-administered medications for this visit.     Social history: She continues to work as a schoolteacher.   She is engaged to be .       /76   Pulse 67   Wt 78.6 kg (173 lb 3.2 oz)   BMI 24.85 kg/m    NAD  Eyes - no periorbital edema, conjunctival injection, scleral icterus  Neck - no thyromegaly  CV - RRR.  Normal pulses in feet.  No edema  Neuro - sensation intact to monofilament on soles of feet.  DTR 2/4 biceps  Skin - normal texture   Feet - no ulcers     Recent Labs   Lab Test 09/27/22  1632 09/27/22  1537 02/03/20  1750 02/03/20  1745 02/03/20  0000 08/07/19  0000 01/24/19  0831 01/24/19  0821 11/23/16  0939 11/23/16  0931 05/29/15  1738 05/28/15  2129   A1C  --   --   --   --   --   --   --   --   --   --   --  14.0*   HEMOGLOBINA1  --  6.1*  --   --  7.3*   < >  --   --    < >  --    < >  --    TSH  --   --   --   --   --   --   --  0.96  --  1.05  --   --    T4  --   --   --   --   --   --   --   --   --  1.05  --   --    LDL  --   --   --   --   --   --   --  67  --  86  --   --    HDL  --   --   --   --   --   --   --  78  --  76  --   --    TRIG  --   --   --   --   --   --   --  36  --  40  --   --    CR 0.86  --   --  0.78  --   --   --  0.85  --  0.88   < > 0.64   MICROL  --   --  17  --   --   --  7  --    < >  --    < >  --     < > = values in this interval not displayed.       Assessment and plan:    1.  Diabetes control.  Mallory's  A1c is unnecessarily low and she is having severe hypoglycemia.  She is very fearful of long-term complications of diabetes.  We had a discussion about the evidence that supports the level of glycemic control she needs to minimize her risk of complications.  I told her that she only needs to get an A1c down to 7%.  She is entering many grams of carb a day into her pump.  Its not clear that she is really eating that many carbs.  It would be unusual to eat that many carbs each day for someone her size.  I recommended she talk with the dietitian to review carb counting.  I encouraged her to take pictures of her meals so she can review those with the dietitian.   Her basal to bolus proportion is clearly weighted towards bolus.  This is probably because she is entering too many carbs.  I think this is the problem that is causing her hypoglycemia .  We also talked about the importance of not bolusing too early for food you plan to eat in the future. She has been doing this on the way to restaurants and other places and finds that she often gets low before the food comes.  I asked her to be cautious on those days when she is not certain the timing at which her food will arrive.  Finally, we talked about the episode of severe hypoglycemia and how it was managed.  She does have Baqsimi at home and I recommended she review that again with her fiancé.  I told her fiancé could even carry it in her bag so it is available when they are away from home.  I recommended she ask her financially if she would like to come in and have a session with the diabetes educators just to learn more about diabetes and its management.  She will talk to her fiancé and let me know.    2.  Diabetes complications.  She needs to see the eye doctor.  Her feet are fine.  I will check her renal function today.    3.CVD risk.  Her LDL was low on past checks and I will repeat it today since it probably will not change enough to warrant treatment.  Her blood pressure is in target.    Follow-up with Sara Larry in about 3 months and me in 6 to 8 months.      I spent 30 minutes with the patient today.  On the same day of visit I spent an additional 20 minutes reviewing and interpreting her pump and CGM data, ordering tests, reviewing her chart, and doing documentation.  Josiane Cantrell MD

## 2022-09-27 ENCOUNTER — OFFICE VISIT (OUTPATIENT)
Dept: ENDOCRINOLOGY | Facility: CLINIC | Age: 32
End: 2022-09-27
Payer: COMMERCIAL

## 2022-09-27 ENCOUNTER — LAB (OUTPATIENT)
Dept: LAB | Facility: CLINIC | Age: 32
End: 2022-09-27

## 2022-09-27 VITALS
DIASTOLIC BLOOD PRESSURE: 76 MMHG | HEART RATE: 67 BPM | BODY MASS INDEX: 24.85 KG/M2 | SYSTOLIC BLOOD PRESSURE: 129 MMHG | WEIGHT: 173.2 LBS

## 2022-09-27 DIAGNOSIS — E10.9 TYPE 1 DIABETES MELLITUS WITHOUT COMPLICATION (H): ICD-10-CM

## 2022-09-27 DIAGNOSIS — E10.9 TYPE 1 DIABETES MELLITUS WITHOUT COMPLICATION (H): Primary | ICD-10-CM

## 2022-09-27 LAB
CREAT SERPL-MCNC: 0.86 MG/DL (ref 0.51–0.95)
CREAT UR-MCNC: 25.2 MG/DL
GFR SERPL CREATININE-BSD FRML MDRD: >90 ML/MIN/1.73M2
HBA1C MFR BLD: 6.1 % (ref 4.3–?)
MICROALBUMIN UR-MCNC: <12 MG/L
MICROALBUMIN/CREAT UR: NORMAL MG/G{CREAT}

## 2022-09-27 PROCEDURE — 83036 HEMOGLOBIN GLYCOSYLATED A1C: CPT | Performed by: INTERNAL MEDICINE

## 2022-09-27 PROCEDURE — 99215 OFFICE O/P EST HI 40 MIN: CPT | Performed by: INTERNAL MEDICINE

## 2022-09-27 PROCEDURE — 36415 COLL VENOUS BLD VENIPUNCTURE: CPT | Performed by: PATHOLOGY

## 2022-09-27 PROCEDURE — 82043 UR ALBUMIN QUANTITATIVE: CPT | Performed by: INTERNAL MEDICINE

## 2022-09-27 PROCEDURE — 82565 ASSAY OF CREATININE: CPT | Performed by: PATHOLOGY

## 2022-09-27 ASSESSMENT — PAIN SCALES - GENERAL: PAINLEVEL: NO PAIN (0)

## 2022-09-27 NOTE — LETTER
Date:September 28, 2022      Patient was self referred, no letter generated. Do not send.        [facility] Health Information

## 2022-09-27 NOTE — PATIENT INSTRUCTIONS
Only bolus for the minimum amount of food you think you are going to eat.  If you eat more than that go ahead and bolus again for the additional food.    If the time you are going to eat is unpredictable, try not to bolus so far before the meal.  Better to just bolus before you eat than too early.

## 2022-09-27 NOTE — LETTER
9/27/2022       RE: Mallory Barajas  [address] [facility] [address]     Dear Colleague,    Thank you for referring your patient, Mallory Barajas, to the [facility] ENDOCRINOLOGY [facility] [place] at [facility]. Please see a copy of my visit note below.    Outcome for 09/23/22 11:54 AM :Patient is sharing data via clinic device website and patient has been instructed to update data on website. Find login information by using .[employer]    Dexcom connected waiting on Tandem data   Dorys Johnson CMA    Outcome for 09/26/22 10:45 AM :Glucose sent via Email   Marian Ford    Mallory Barajas is a 32 ear old female with type 1 diabetes mellitus.  Pt was dx having type 1 diabetes mellitus in May 2015.     Mallory is currently taking using a Tandem insulin pump- control IQ and DexcomG6 sensor.  Her Dexcom data are below.    Mallory recognizes that she is having several episodes of hypoglycemia.  She reports that she tends to enter more grams of carbs into her pump than she actually ends up eating and this often causes her to be low.  She also will consistently bolus 15 minutes before she plans to eat, even if she does not know that she will be able to eat at that time.  She has had some troubling episodes because of this behavior.  Specifically, she became very symptomatic when lunch was delayed by a shopping trip at 1 point this summer and when the line for getting food turned out to be longer than she expected at a bakery.  She also had an episode of severe hypoglycemia that happened at home.  This was recognized by her fiancé who called the paramedics.  Mallory was treated with IV glucose and did not need to go to the hospital.  They do have Baqsimi on hand.  They discussed how she should use that next time.    When asked why Mallory is so aggressive with her insulin dosing, she says she does worry a lot about hyperglycemia and the impact this will have on her body as she ages.  She always  feels as if she can just eat something to keep her self from getting too low and therefore over bolusing is not really a problem.  She is beginning to think that perhaps this is not the correct approach.    She continues to be very active.  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  She usually just removes her pump when she is exercising.    Data collected from the pump shows that 75% of her insulin is delivered as a food bolus.  15% of her daily insulin is given as basal.  Correction but plus food boluses constitute 21% of her boluses and correction only boluses constitute 1% of her boluses.  Her total daily insulin dose is 32 units.  She gives on average 13 boluses a day.  The download says she had averages 423 g of carb each day.  When I looked at the grams entered on a daily basis, I can see where that average comes from although Mallory herself does not think she eats that many carbs.    Her pump settings are midnight basal 0.25, correction 1 for 50, carb ratio 1 for 18, and target 100.    She has not seen her eye doctor in over a year but has no visual concerns.  She denies having any paresthesias.  She denies chest pain or shortness of breath.  She has no GI symptoms.  Her menstrual cycle is regular.               0.25 a                        Current Outpatient Medications   Medication     acetone, Urine, test STRP     blood glucose (ACCU-CHEK MACARIO PLUS) test strip     blood glucose monitoring (ACCU-CHEK MACARIO PLUS) meter device kit     blood glucose monitoring (NO BRAND SPECIFIED) meter device kit     blood glucose monitoring (ULTRA THIN 30G) lancets     Continuous Blood Gluc  (DEXCOM G6 ) GHADA     Continuous Blood Gluc Sensor (DEXCOM G6 SENSOR) MISC     Continuous Blood Gluc Transmit (DEXCOM G6 TRANSMITTER) MISC     Glucagon (BAQSIMI TWO PACK) 3 MG/DOSE POWD     insulin glargine (LANTUS VIAL) 100 UNIT/ML vial      insulin lispro (HUMALOG) 100 UNIT/ML Cartridge     insulin pen needle 30G X 5 MM     insulin syringes, disposable, U-100 0.3 ML MISC     Wound Dressing Adhesive (MASTISOL ADHESIVE) LIQD     No current facility-administered medications for this visit.     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       /76   Pulse 67   Wt 78.6 kg (173 lb 3.2 oz)   BMI 24.85 kg/m    NAD  Eyes - no periorbital edema, conjunctival injection, scleral icterus  Neck - no thyromegaly  CV - RRR.  Normal pulses in feet.  No edema  Neuro - sensation intact to monofilament on soles of feet.  DTR 2/4 biceps  Skin - normal texture   Feet - no ulcers     Recent Labs   Lab Test 09/27/22  1632 09/27/22  1537 02/03/20  1750 02/03/20  1745 02/03/20  0000 08/07/19  0000 01/24/19  0831 01/24/19  0821 11/23/16  0939 11/23/16  0931 05/29/15  1738 05/28/15  2129   A1C  --   --   --   --   --   --   --   --   --   --   --  14.0*   HEMOGLOBINA1  --  6.1*  --   --  7.3*   < >  --   --    < >  --    < >  --    TSH  --   --   --   --   --   --   --  0.96  --  1.05  --   --    T4  --   --   --   --   --   --   --   --   --  1.05  --   --    LDL  --   --   --   --   --   --   --  67  --  86  --   --    HDL  --   --   --   --   --   --   --  78  --  76  --   --    TRIG  --   --   --   --   --   --   --  36  --  40  --   --    CR 0.86  --   --  0.78  --   --   --  0.85  --  0.88   < > 0.64   MICROL  --   --  17  --   --   --  7  --    < >  --    < >  --     < > = values in this interval not displayed.       Assessment and plan:    1.  Diabetes control.  Mallory's  A1c is unnecessarily low and she is having severe hypoglycemia.  She is very fearful of long-term complications of diabetes.  We had a discussion about the evidence that supports the level of glycemic control she needs to minimize her risk of complications.  I told her that she only needs to get an A1c down to 7%.  She is entering many grams of carb a  day into her pump.  Its not clear that she is really eating that many carbs.  It would be unusual to eat that many carbs each day for someone her size.  I recommended she talk with the dietitian to review carb counting.  I encouraged her to take pictures of her meals so she can review those with the dietitian.  Her basal to bolus proportion is clearly weighted towards bolus.  This is probably because she is entering too many carbs.  I think this is the problem that is causing her hypoglycemia .  We also talked about the importance of not bolusing too early for food you plan to eat in the future. She has been doing this on the way to restaurants and other places and finds that she often gets low before the food comes.  I asked her to be cautious on those days when she is not certain the timing at which her food will arrive.  Finally, we talked about the episode of severe hypoglycemia and how it was managed.  She does have Baqsimi at home and I recommended she review that again with her fiancé.  I told her fiancé could even carry it in her bag so it is available when they are away from home.  I recommended she ask her financially if she would like to come in and have a session with the diabetes educators just to learn more about diabetes and its management.  She will talk to her fiancé and let me know.    2.  Diabetes complications.  She needs to see the eye doctor.  Her feet are fine.  I will check her renal function today.    3.CVD risk.  Her LDL was low on past checks and I will repeat it today since it probably will not change enough to warrant treatment.  Her blood pressure is in target.    Follow-up with Sara Larry in about 3 months and me in 6 to 8 months.      I spent 30 minutes with the patient today.  On the same day of visit I spent an additional 20 minutes reviewing and interpreting her pump and CGM data, ordering tests, reviewing her chart, and doing documentation.  Josiane Cantrell MD      Again,  thank you for allowing me to participate in the care of your patient.      Sincerely,    Josiane Cantrell MD

## 2022-10-01 ENCOUNTER — OFFICE VISIT (OUTPATIENT)
Dept: OPHTHALMOLOGY | Facility: CLINIC | Age: 32
End: 2022-10-01
Attending: INTERNAL MEDICINE
Payer: COMMERCIAL

## 2022-10-01 DIAGNOSIS — E10.9 TYPE 1 DIABETES MELLITUS WITHOUT COMPLICATION (H): ICD-10-CM

## 2022-10-01 PROCEDURE — 92004 COMPRE OPH EXAM NEW PT 1/>: CPT | Performed by: STUDENT IN AN ORGANIZED HEALTH CARE EDUCATION/TRAINING PROGRAM

## 2022-10-01 ASSESSMENT — SLIT LAMP EXAM - LIDS
COMMENTS: NORMAL
COMMENTS: NORMAL

## 2022-10-01 ASSESSMENT — CONF VISUAL FIELD
METHOD: COUNTING FINGERS
OS_NORMAL: 1
OD_NORMAL: 1

## 2022-10-01 ASSESSMENT — CUP TO DISC RATIO
OD_RATIO: 0.2
OS_RATIO: 0.2

## 2022-10-01 ASSESSMENT — EXTERNAL EXAM - RIGHT EYE: OD_EXAM: NORMAL

## 2022-10-01 ASSESSMENT — TONOMETRY
OS_IOP_MMHG: 14
IOP_METHOD: ICARE
OD_IOP_MMHG: 14

## 2022-10-01 ASSESSMENT — VISUAL ACUITY
OS_SC: 20/20
OS_SC+: +2
OD_SC: 20/15
METHOD: SNELLEN - LINEAR

## 2022-10-01 ASSESSMENT — EXTERNAL EXAM - LEFT EYE: OS_EXAM: NORMAL

## 2022-10-01 NOTE — NURSING NOTE
Chief Complaints and History of Present Illnesses   Patient presents with     Diabetic Eye Exam     Chief Complaint(s) and History of Present Illness(es)     Diabetic Eye Exam     Vision: is stable    Associated symptoms: Negative for headaches, nausea, flashes and floaters    Diabetes Type: Type 1 and on insulin    Blood Sugars: is controlled    Treatments tried: no treatments    Pain scale: 0/10              Comments     Pt here today for annual routine diabetic eye examination.  DM2 treated with insulin.  GEOVANNY was 8/17/2020  States vision is stable. No eye health or vision concerns.  No pain or discomfort in eyes.    Last A1C Lab Results:       Component                Value               Date                       A1C                      6.1               09/27/2022    Ocular meds = none    Alejandro BAUMANN, MAR 9:21 AM 10/01/2022

## 2022-10-01 NOTE — PROGRESS NOTES
CC -   diabetic eye exam    INTERVAL HISTORY - Initial visit    PMH -   Mallory Barajas is a 32 year old female with PMH of DM1 who is here for a diabetic eye exam.     she was diagnosed with diabetes at age 25. She is on insulin.     Last A1c:   Hemoglobin A1C   Date Value Ref Range Status   05/28/2015 14.0 (H) 4.3 - 6.0 % Final       Past Medical History:  DM1      Past Ocular History:  None, No surgeries, no LASIK      ASSESSMENT & PLAN    #DM1 without retinopathy   - discussed blood sugar and blood glucose control   - A1c 6.1   - no retinopathy  10/01/22 - There is no retinopathy on exam today. Most recent A1c was 6.1. Recommend annual follow up. Will request staff to call and make a follow up for 1 year.     return to clinic: 1 year for VTD      ATTESTATION     Attending Attestation:     Complete documentation of historical and exam elements from today's encounter can be found in the full encounter summary report (not reduplicated in this progress note).  I personally obtained the chief complaint(s) and history of present illness.  I confirmed and edited as necessary the review of systems, past medical/surgical history, family history, social history, and examination findings as documented by others; and I examined the patient myself.  I personally reviewed the relevant tests, images, and reports as documented above.  I formulated and edited as necessary the assessment and plan and discussed the findings and management plan with the patient and family      Luis Felipe Vila MD  Retina Fellow  Department of Ophthalmology  [facility]  Pager: [phone]

## 2022-11-01 DIAGNOSIS — E10.9 TYPE 1 DIABETES MELLITUS WITHOUT COMPLICATION (H): Primary | ICD-10-CM

## 2022-11-02 RX ORDER — INSULIN LISPRO 100 [IU]/ML
INJECTION, SOLUTION INTRAVENOUS; SUBCUTANEOUS
Qty: 50 ML | Refills: 3 | Status: SHIPPED | OUTPATIENT
Start: 2022-11-02 | End: 2024-02-12

## 2023-02-24 ENCOUNTER — VIRTUAL VISIT (OUTPATIENT)
Dept: EDUCATION SERVICES | Facility: CLINIC | Age: 33
End: 2023-02-24
Payer: COMMERCIAL

## 2023-02-24 DIAGNOSIS — E10.9 TYPE 1 DIABETES MELLITUS WITHOUT COMPLICATION (H): ICD-10-CM

## 2023-02-24 PROCEDURE — G0108 DIAB MANAGE TRN  PER INDIV: HCPCS | Mod: VID | Performed by: DIETITIAN, REGISTERED

## 2023-02-24 RX ORDER — INSULIN GLARGINE 100 [IU]/ML
INJECTION, SOLUTION SUBCUTANEOUS
Qty: 10 ML | Refills: 1 | Status: SHIPPED | OUTPATIENT
Start: 2023-02-24 | End: 2024-07-11

## 2023-02-24 NOTE — TELEPHONE ENCOUNTER
Mallory Mclean Dr.'s Lantus vial for her MDI back up plan is about 2 months .  I pended a reorder of the prescription in the medication list to get filled again if you agree.       Thanks  Melisa Sanchez RD, LD, [facility]  Diabetes Education

## 2023-02-24 NOTE — LETTER
"    2/24/2023         RE: Mallory Barajas  [address] [facility] [address]        Dear Colleague,    Thank you for referring your patient, Mallory Barajas, to the [facility] DIABETES EDUCATION [place]. Please see a copy of my visit note below.    Diabetes Self-Management Education & Support    Presents for: Individual review  Type of service:  Video Visit  Originating Location (pt. Location): Home  Distant Location (provider location): Home  Mode of Communication:  Video Conference via [employer]    How would patient like to obtain AVS? Angel    ASSESSMENT:  First visit with Diabetes Education in quite some time.  Overall control is excellent, A1c in the 6s.   Went through many different subjects for risk reduction, control etc.   8 years Diabetes   7 years on pump    Two lows in the past year     bolused and didn't eat   bolused twice for a snack     Low alarm  55    baqsimi  Watch video on [employer] website   Watch IOB   Talked about different algoritms in control IQ and how they work   micocarbing   Carbohydrate sources all     Basketball - takes off completely due to nature of the sports    Walking dogs - hasn't turned it on     Activity mode    Reviewed target range     Low alarm 80 to 85     Check on standard deviation - is their a goal for time in target     MDI back up plan   Syringes picking up & ketnoes - sresnet   Has a vial of lanuts in the friege    Has a meter in all spots  - has and good here     Lantus - ask dr. alves to put another vial in to [facility]     aglorithim graph  On Sequence website     Suspend when not in use very important to get all autocorrections   If 225 after basketball see how the autocorrection handles this first after supsending       Patient's most recent   Lab Results   Component Value Date    A1C 14.0 05/28/2015    HEMOGLOBINA1 6.1 09/27/2022     {is/is not:566359::\"is\"} meeting goal of {A1C GOALS:897250}    Diabetes knowledge and skills assessment:   Patient is " "knowledgeable in diabetes management concepts related to: {AADE 7:152593}    Continue education with the following diabetes management concepts: {AADE 7:037860}    Based on learning assessment above, most appropriate setting for further diabetes education would be: {Visit Type:637366} setting.      PLAN    ***  Topics to cover at upcoming visits: {AADE 7:974300}    Follow-up: ***    See Care Plan for co-developed, patient-state behavior change goals.  AVS provided for patient today.    Education Materials Provided:  {CDE EDUCATION MATERIALS:672398}      SUBJECTIVE/OBJECTIVE:  Presents for: Individual review  Accompanied by: Self, Spouse  Diabetes education in the past 24mo: Yes  Focus of Visit: Monitoring, Reducing Risks, Taking Medication, Problem Solving  Diabetes type: Type 1  Disease course: Stable  How confident are you filling out medical forms by yourself:: Extremely  Other concerns:: None  Cultural Influences/Ethnic Background:  Not  or   ***    Diabetes Symptoms & Complications:          Patient Problem List and Family Medical History reviewed for relevant medical history, current medical status, and diabetes risk factors.    Vitals:  There were no vitals taken for this visit.  Estimated body mass index is 24.85 kg/m  as calculated from the following:    Height as of 2/3/20: 1.778 m (5' 10\").    Weight as of 9/27/22: 78.6 kg (173 lb 3.2 oz).   Last 3 BP:   BP Readings from Last 3 Encounters:   09/27/22 129/76   02/03/20 119/76   08/07/19 120/73       History   Smoking Status     Never   Smokeless Tobacco     Never       Labs:  Lab Results   Component Value Date    A1C 14.0 05/28/2015    HEMOGLOBINA1 6.1 09/27/2022     Lab Results   Component Value Date     05/29/2015     Lab Results   Component Value Date    LDL 67 01/24/2019     HDL Cholesterol   Date Value Ref Range Status   01/24/2019 78 >49 mg/dL Final   ]  GFR Estimate   Date Value Ref Range Status   09/27/2022 >90 >60 " mL/min/1.73m2 Final     Comment:     Effective December 21, 2021 eGFRcr in adults is calculated using the 2021 CKD-EPI creatinine equation which includes age and gender (Lamberto almonte al., [place], DOI: 10.1056/QSHAqm9001755)   02/03/2020 >90 >60 mL/min/[1.73_m2] Final     Comment:     Non  GFR Calc  Starting 12/18/2018, serum creatinine based estimated GFR (eGFR) will be   calculated using the Chronic Kidney Disease Epidemiology Collaboration   (CKD-EPI) equation.       GFR Estimate If Black   Date Value Ref Range Status   02/03/2020 >90 >60 mL/min/[1.73_m2] Final     Comment:      GFR Calc  Starting 12/18/2018, serum creatinine based estimated GFR (eGFR) will be   calculated using the Chronic Kidney Disease Epidemiology Collaboration   (CKD-EPI) equation.       Lab Results   Component Value Date    CR 0.86 09/27/2022    CR 0.78 02/03/2020     No results found for: MICROALBUMIN    Healthy Eating:  Healthy Eating Assessed Today: Yes  Meal planning/habits: Carb counting    Being Active:  Being Active Assessed Today: Yes  Exercise:: Yes  How intense was your typical exercise? : Very heavy (like fast running or stair climbing)    Monitoring:  Monitoring Assessed Today: Yes  Blood Glucose Meter: CGM    ***    Taking Medications:  Diabetes Medication(s)     Diabetic Other       Glucagon (BAQSIMI TWO PACK) 3 MG/DOSE POWD    Spray 1 each in nostril as needed (severe hypoglycemia)    Insulin       insulin glargine (LANTUS VIAL) 100 UNIT/ML vial    Inject 6 units subcutaneous daily ONLY IF INSULIN PUMP FAILS.     insulin lispro (HUMALOG) 100 UNIT/ML Cartridge    Use in pump aprox 50 units daily               Problem Solving:                 Reducing Risks:  Reducing Risks Assessed Today: Yes    Healthy Coping:  Healthy Coping Assessed Today: Yes  Emotional response to diabetes: Ready to learn  Informal Support system:: Spouse  Stage of change: ACTION (Actively working towards change)  Support  resources: Websites  Patient Activation Measure Survey Score:  No flowsheet data found.      Care Plan and Education Provided:  {Care Plan and Eduction Provided:197214}    ***    Time Spent: {cde time spent:343651} minutes  Encounter Type: Individual    Any diabetes medication dose changes were made via the CDE Protocol per the patient's {diabetes education provider list:498718}. A copy of this encounter was shared with the provider.

## 2023-02-24 NOTE — PROGRESS NOTES
Diabetes Self-Management Education & Support    Presents for: Individual review  Type of service:  Video Visit  Originating Location (pt. Location): Home  Distant Location (provider location): Home  Mode of Communication:  Video Conference via Pensqr    How would patient like to obtain AVS? My[employer]ogan    ASSESSMENT:  First visit with Diabetes Education in quite some time.  Overall control is excellent, A1c in the 6s.   Went through many different  Advanced subjects for insulin use,  risk reduction, control etc.  Has had Diabetes for 8 years and been on a pump for most of the time.  Had two bigger lows in the last year, but was able to identify reasons why they happened (bolused and didn't eat / bolused twice for a snack).  Reviewed alerts and alarms, watching trends arrows and microcarbing to help level the sensor glucose line; carry a source of carbohydrate that can be divided into known smaller amounts of grams and keep an eye on IOB.  Mallory's wife will watch the video on Baqsimi administration a few times to feel comfortable with it and train other friends / family as needed.   Change low alarm from 80 to 85mg/dL for an earlier warning to see how this goes (with dexcom only sending 1 reading every 5 minutes, dicussed how this can warn of a drop significantly earlier)  Reviewed how the different algorithms of Xylohart work; see SandForcet with table.Basketball - takes off completely due to nature of the sport and talked about how suspending the pump is more important now than in the past with the potential to miss an autocorrection.  Runs sometimes low 200s after basketball; will have to experiment and see what works best for bringing blood sugars down afterwards; allowing an autocorretcion + more control IQ basal versus a full correction.  Utilize activity mode to lessen the ControlIQ basal as needed.   Reviewed Ambulatory Glucose Profile reports, standard deviation and time in target.       MDI back up plan  "reviewed;   has a vial of lantus that is 2 months .  Telephone encounter sent to endocrinologist.  Reviewed safety with travel and supplies.  Syringes picking up & ketones strips resent. Has a glucometer in all spots incase of sensor fail      Overall excellent control and understanding of Diabetes, type 1, insulin use/action and sensor glucose data. No changes to pump settings.            Patient's most recent   is meeting goal of <7.0 - see lab values from [facility]    Diabetes knowledge and skills assessment:   Patient is knowledgeable in diabetes management concepts related to: Healthy Eating, Being Active, Monitoring, Taking Medication, Problem Solving, Reducing Risks and Healthy Coping  Continue education with the following diabetes management concepts: any topics as needed, ongoing pump/cgm reviews.   Based on learning assessment above, most appropriate setting for further diabetes education would be: Individual setting.      PLAN  No changes to pump settings   See detailed AVS  Telephone encounter to Endocrinologist about new Lantus     SUBJECTIVE/OBJECTIVE:  Presents for: Individual review  Accompanied by: Self, Spouse  Diabetes education in the past 24mo: Yes  Focus of Visit: Monitoring, Reducing Risks, Taking Medication, Problem Solving  Diabetes type: Type 1  Disease course: Stable  How confident are you filling out medical forms by yourself:: Extremely  Other concerns:: None  Cultural Influences/Ethnic Background:  Not  or     Diabetes Symptoms & Complications:  Not assessed at this visit     Patient Problem List and Family Medical History reviewed for relevant medical history, current medical status, and diabetes risk factors.    Vitals:  There were no vitals taken for this visit.  Estimated body mass index is 24.85 kg/m  as calculated from the following:    Height as of 2/3/20: 1.778 m (5' 10\").    Weight as of 22: 78.6 kg (173 lb 3.2 oz).   Last 3 BP:   BP Readings from Last 3 " Encounters:   09/27/22 129/76   02/03/20 119/76   08/07/19 120/73       History   Smoking Status     Never   Smokeless Tobacco     Never       Labs:  Lab Results   Component Value Date    A1C 14.0 05/28/2015    HEMOGLOBINA1 6.1 09/27/2022     Lab Results   Component Value Date     05/29/2015     Lab Results   Component Value Date    LDL 67 01/24/2019     HDL Cholesterol   Date Value Ref Range Status   01/24/2019 78 >49 mg/dL Final   ]  GFR Estimate   Date Value Ref Range Status   09/27/2022 >90 >60 mL/min/1.73m2 Final     Comment:     Effective December 21, 2021 eGFRcr in adults is calculated using the 2021 CKD-EPI creatinine equation which includes age and gender (Lamberto et al., NEJM, DOI: 10.1056/UJJFhb3283761)   02/03/2020 >90 >60 mL/min/[1.73_m2] Final     Comment:     Non  GFR Calc  Starting 12/18/2018, serum creatinine based estimated GFR (eGFR) will be   calculated using the Chronic Kidney Disease Epidemiology Collaboration   (CKD-EPI) equation.       GFR Estimate If Black   Date Value Ref Range Status   02/03/2020 >90 >60 mL/min/[1.73_m2] Final     Comment:      GFR Calc  Starting 12/18/2018, serum creatinine based estimated GFR (eGFR) will be   calculated using the Chronic Kidney Disease Epidemiology Collaboration   (CKD-EPI) equation.       Lab Results   Component Value Date    CR 0.86 09/27/2022    CR 0.78 02/03/2020     No results found for: MICROALBUMIN    Healthy Eating:  Healthy Eating Assessed Today: Yes  Meal planning/habits: Carb counting    Being Active:  Being Active Assessed Today: Yes  Exercise:: Yes  How intense was your typical exercise? : Very heavy (like fast running or stair climbing)    Monitoring:  Monitoring Assessed Today: Yes  Blood Glucose Meter: CGM      Taking Medications:  Diabetes Medication(s)     Diabetic Other       Glucagon (BAQSIMI TWO PACK) 3 MG/DOSE POWD    Spray 1 each in nostril as needed (severe hypoglycemia)    Insulin        insulin glargine (LANTUS VIAL) 100 UNIT/ML vial    Inject 6 units subcutaneous daily ONLY IF INSULIN PUMP FAILS.     insulin lispro (HUMALOG) 100 UNIT/ML Cartridge    Use in pump aprox 50 units daily      Problem Solving:  yes     Reducing Risks:  Reducing Risks Assessed Today: Yes    Healthy Coping:  Healthy Coping Assessed Today: Yes  Emotional response to diabetes: Ready to learn  Informal Support system:: Spouse  Stage of change: ACTION (Actively working towards change)  Support resources: Websites  Patient Activation Measure Survey Score:  No flowsheet data found.      Care Plan and Education Provided:  Care Plan: Diabetes   Updates made by Josiane Sanchez RD since 3/5/2023 12:00 AM      Problem: HbA1C Not In Goal       Goal: Establish Regular Follow-Ups with PCP       Task: Discuss with PCP the recommended timing for patient's next follow up visit(s)    Responsible User: Josiane Sanchez RD      Task: Discuss schedule for PCP visits with patient    Responsible User: Josiane Sanchez RD      Goal: Get HbA1C Level in Goal       Task: Educate patient on diabetes education self-management topics    Responsible User: Josiane Sanchez RD      Task: Educate patient on benefits of regular glucose monitoring    Responsible User: Josiane Sanchez RD      Task: Refer patient to appropriate extended care team member, as needed (Medication Therapy Management, Behavioral Health, Physical Therapy, etc.)    Responsible User: Josiane Sanchez RD      Task: Discuss diabetes treatment plan with patient    Responsible User: Josiane Sanchez RD      Problem: Diabetes Self-Management Education Needed to Optimize Self-Care Behaviors       Goal: Understand diabetes pathophysiology and disease progression       Task: Provide education on diabetes pathophysiology and disease progression specfic to patient's diabetes type    Responsible User: Josiane Sanchez RD      Goal: Healthy Eating - follow a  healthy eating pattern for diabetes       Task: Provide education on portion control and consistency in amount, composition and timing of food intake    Responsible User: Josiane Sanchez RD      Task: Provide education on managing carbohydrate intake (carbohydrate counting, plate planning method, etc.)    Responsible User: Josiane Sanchez RD      Task: Provide education on weight management    Responsible User: Josiane Sanchez RD      Task: Provide education on heart healthy eating    Responsible User: Josiane Sanchez RD      Task: Provide education on eating out    Responsible User: Josiane Sanchez RD      Task: Develop individualized healthy eating plan with patient    Responsible User: Josiane Sanchez RD      Goal: Being Active - get regular physical activity, working up to at least 150 minutes per week       Task: Provide education on relationship of activity to glucose and precautions to take if at risk for low glucose    Responsible User: Josiane Sanchez RD      Task: Discuss barriers to physical activity with patient    Responsible User: Josiane Sanchez RD      Task: Develop physical activity plan with patient    Responsible User: Josiane Sanchez RD      Task: Explore community resources including walking groups, assistance programs, and home videos    Responsible User: Josiane Sanchez RD      Goal: Monitoring - monitor glucose and ketones as directed    Note:    Advanced CGM review, time in target, Ambulatory Glucose Profile etc.      Task: Provide education on blood glucose monitoring (purpose, proper technique, frequency, glucose targets, interpreting results, when to use glucose control solution, sharps disposal)    Responsible User: Josiane Sanchez RD      Task: Provide education on continuous glucose monitoring (sensor placement, use of kush or /reader, understanding glucose trends, alerts and alarms, differences between sensor glucose and  blood glucose)    Responsible User: Josiane Sanchez RD      Task: Provide education on ketone monitoring (when to monitor, frequency, etc.)    Responsible User: Josiane Sanchez RD      Goal: Taking Medication - patient is consistently taking medications as directed       Task: Provide education on action of prescribed medication, including when to take and possible side effects    Responsible User: Josiane Sanchez RD      Task: Provide education on insulin and injectable diabetes medications, including administration, storage, site selection and rotation for injection sites    Responsible User: Josiane Sanchez RD      Task: Discuss barriers to medication adherence with patient and provide management technique ideas as appropriate    Responsible User: Josiane Sanchez RD      Task: Provide education on frequency and refill details of medications    Responsible User: Josiane Sanchez RD      Goal: Problem Solving - know how to prevent and manage short-term diabetes complications       Task: Provide education on high blood glucose - causes, signs/symptoms, prevention and treatment    Responsible User: Josiane Sanchez RD      Task: Provide education on low blood glucose - causes, signs/symptoms, prevention, treatment, carrying a carbohydrate source at all times, and medical identification Completed 3/5/2023   Responsible User: Josiane Sanchez RD      Task: Provide education on safe travel with diabetes Completed 3/5/2023   Responsible User: Josiane Sanchez RD      Task: Provide education on how to care for diabetes on sick days    Responsible User: Josiane Sanchez RD      Task: Provide education on when to call a health care provider    Responsible User: Josiane Sanchez RD      Goal: Reducing Risks - know how to prevent and treat long-term diabetes complications       Task: Provide education on major complications of diabetes, prevention, early diagnostic measures and  treatment of complications    Responsible User: Josiane Sanchez RD      Task: Provide education on recommended care for dental, eye and foot health    Responsible User: Josiane Sanchez RD      Task: Provide education on Hemoglobin A1c - goals and relationship to blood glucose levels    Responsible User: Josiane Sanchez RD      Task: Provide education on recommendations for heart health - lipid levels and goals, blood pressure and goals, and aspirin therapy, if indicated    Responsible User: Josiane Sanchez RD      Task: Provide education on tobacco cessation    Responsible User: Josiane Sanchez RD      Goal: Healthy Coping - use available resources to cope with the challenges of managing diabetes       Task: Discuss recognizing feelings about having diabetes    Responsible User: Josiane Sanchez RD      Task: Provide education on the benefits of making appropriate lifestyle changes    Responsible User: Josiane Sanchez RD      Task: Provide education on benefits of utilizing support systems    Responsible User: Josiane Sanchez RD      Task: Discuss methods for coping with stress    Responsible User: Josiane Sanchez RD      Task: Provide education on when to seek professional counseling    Responsible User: Josiane Sanchez RD Elizabeth Swartout RD, WAYNE, [facility]  Diabetes Education      Time Spent: 50 minutes  Encounter Type: Individual    A copy of this encounter was shared with the provider.

## 2023-03-15 DIAGNOSIS — E10.9 TYPE 1 DIABETES MELLITUS WITHOUT COMPLICATION (H): ICD-10-CM

## 2023-03-17 RX ORDER — PROCHLORPERAZINE 25 MG/1
SUPPOSITORY RECTAL
Qty: 9 EACH | Refills: 2 | Status: SHIPPED | OUTPATIENT
Start: 2023-03-17 | End: 2023-12-30

## 2023-03-17 NOTE — TELEPHONE ENCOUNTER
Last Clinic Visit: 9/27/2022  [facility] [place]

## 2023-03-19 DIAGNOSIS — E10.9 TYPE 1 DIABETES MELLITUS WITHOUT COMPLICATION (H): ICD-10-CM

## 2023-03-21 RX ORDER — PROCHLORPERAZINE 25 MG/1
SUPPOSITORY RECTAL
Qty: 1 EACH | Refills: 1 | Status: SHIPPED | OUTPATIENT
Start: 2023-03-21 | End: 2023-10-20

## 2023-03-24 NOTE — PROGRESS NOTES
Outcome for 03/24/23 1:17 PM : Tandem & Dexcom data will be in notes on 3/27.  Marian Ford  Tandem data goes to March 23rd. Dexcom data goes to March 27th.  Marian Barajas is a 89 ear old female with type 1 diabetes mellitus.  Pt was dx having type 1 diabetes mellitus in May 2015.     Mallory is currently taking using a Tandem insulin pump- control IQ and DexcomG6 sensor.  Her Dexcom data are below.  Overall she reports things are going well.  She did have a pump failure 4 days ago when the pump told her she had a cartridge error.  She called the company and had a new pump sent to her over the weekend.  She got it yesterday.  When she was without her pump she followed the plan of taking Lantus and using her mealtime insulin for correction.  Overall things went pretty well.  She met with the nurse educator about a month ago and found the session to be very helpful.  As a result of this visit, she now has all of her diabetes supplies in 1 place and her wife knows where the glucagon is.  The nurse educator also noted that she has more lows than many people.  She asked her to keep that value less than 5%.  Mallory thinks that will be helpful for her in managing this problem.  She thinks her lows generally occur because she waits too long between bolusing and eating.  She tries to do her bolus 15 minutes before hand but sometimes she gets busy doing something before the meal and then will get low.  She wonders if she should set an alarm to remind her to eat.  She has no problems recognizing her lows.  She has not needed glucagon.  She plays basketball at least once a week, usually more.  She suspends her pump and takes it off for this activity.  Usually this manages her glucoses quite well.  Sometimes she finds she is a bit high after the activity.  She has no problems with lows overnight after activity.    She saw the eye doctor in the fall and has no retinopathy.  She denies chest pain or shortness of breath.   She denies paresthesias.  She is not planning any pregnancies.       DEVICE SETTINGS  Me Profile Active at the time of upload  Start Time Basal Rate Correction Factor Carb Ratio Target BG  Midnight 0.250 u/hr 1u:50 mg/dL 1u:18.0 g 100 mg/dL  Calculated Total Daily Basal 6.0 units  Duration of Insulin: 3:00 hours  Carbohydrates: On  Max Bolus: 10 units                     Current Outpatient Medications   Medication     acetone urine (KETOSTIX) test strip     blood glucose (ACCU-CHEK MACARIO PLUS) test strip     blood glucose monitoring (ACCU-CHEK MACARIO PLUS) meter device kit     blood glucose monitoring (NO BRAND SPECIFIED) meter device kit     blood glucose monitoring (ULTRA THIN 30G) lancets     Continuous Blood Gluc  (DEXCOM G6 ) GHADA     Continuous Blood Gluc Sensor (DEXCOM G6 SENSOR) MISC     Continuous Blood Gluc Transmit (DEXCOM G6 TRANSMITTER) MISC     Glucagon (BAQSIMI TWO PACK) 3 MG/DOSE POWD     insulin glargine (LANTUS VIAL) 100 UNIT/ML vial     insulin lispro (HUMALOG) 100 UNIT/ML Cartridge     insulin pen needle 30G X 5 MM     insulin syringes, disposable, U-100 0.3 ML MISC     Wound Dressing Adhesive (MASTISOL ADHESIVE) LIQD     No current facility-administered medications for this visit.     Social history significant for being .  She works as a .    /76 (BP Location: Right arm, Patient Position: Sitting, Cuff Size: Adult Regular)   Pulse 61   Wt 76.2 kg (168 lb)   LMP 03/14/2023 (Exact Date)   BMI 24.11 kg/m    VSS  NAD  Eyes - no periorbital edema, conjunctival injection, scleral icterus  Neck - no thyromegaly  Skin - normal texture     Recent Labs   Lab Test 03/28/23  1617 09/27/22  1641 09/27/22  1632 09/27/22  1537 02/03/20  1750 02/03/20  1745 01/24/19  0831 01/24/19  0821 11/23/16  0939 11/23/16  0931 05/29/15  1738 05/28/15  [id number]   A1C  --   --   --   --   --   --   --   --   --   --   --  14.0*   HEMOGLOBINA1 6.4*  --   --  6.1*  --   --     < >  --    < >  --    < >  --    TSH  --   --   --   --   --   --   --  0.96  --  1.05  --   --    T4  --   --   --   --   --   --   --   --   --  1.05  --   --    LDL  --   --   --   --   --   --   --  67  --  86  --   --    HDL  --   --   --   --   --   --   --  78  --  76  --   --    TRIG  --   --   --   --   --   --   --  36  --  40  --   --    CR  --   --  0.86  --   --  0.78  --  0.85  --  0.88   < > 0.64   MICROL  --  <12.0  --   --  17  --    < >  --    < >  --    < >  --     < > = values in this interval not displayed.     Assessment and plan:    1.  Diabetes control.  Overall she is doing very well.  She followed the pump failure plan quite successfully.  She is now back on her pump.  I think she is having too many lows and asked her to be sure to not prolong the time between bolusing and eating.  She is going to set a timer on her watch.  I made no changes in her insulin doses today.    2.  Diabetes complications.  She has been screened and has none.    3.CVD risk.  Risks 10-year risk is sufficiently low she does not need to be on a statin.  Her blood pressure is fine.    Follow-up in 6 months with Pricila Larry and 12 months with me.    Josiane Cantrell MD

## 2023-03-28 ENCOUNTER — OFFICE VISIT (OUTPATIENT)
Dept: ENDOCRINOLOGY | Facility: CLINIC | Age: 33
End: 2023-03-28
Payer: COMMERCIAL

## 2023-03-28 VITALS
WEIGHT: 168 LBS | SYSTOLIC BLOOD PRESSURE: 121 MMHG | BODY MASS INDEX: 24.11 KG/M2 | HEART RATE: 61 BPM | DIASTOLIC BLOOD PRESSURE: 76 MMHG

## 2023-03-28 DIAGNOSIS — E10.9 TYPE 1 DIABETES MELLITUS WITHOUT COMPLICATION (H): Primary | ICD-10-CM

## 2023-03-28 LAB — HBA1C MFR BLD: 6.4 % (ref 4.3–?)

## 2023-03-28 PROCEDURE — 99214 OFFICE O/P EST MOD 30 MIN: CPT | Performed by: INTERNAL MEDICINE

## 2023-03-28 PROCEDURE — 83036 HEMOGLOBIN GLYCOSYLATED A1C: CPT | Performed by: INTERNAL MEDICINE

## 2023-03-28 NOTE — LETTER
Date:March 29, 2023      Patient was self referred, no letter generated. Do not send.        [facility] Health Information

## 2023-03-28 NOTE — NURSING NOTE
Chief Complaint   Patient presents with     Diabetes     Blood pressure 121/76, pulse 61, weight 76.2 kg (168 lb), last menstrual period 03/14/2023.    Marian Ford

## 2023-03-28 NOTE — LETTER
3/28/2023       RE: Mallory Barajas  [address] [facility] [address]     Dear Colleague,    Thank you for referring your patient, Mallory Barajas, to the [facility] ENDO[facility]OLOG[facility] [place] at [facility]. Please see a copy of my visit note below.    Outcome for 03/24/23 1:17 PM : Tandem & Dexcom data will be in notes on 3/27.  Marian Ford  Tandem data goes to March 23rd. Dexcom data goes to March 27th.  Marian Barajas is a 89 ear old female with type 1 diabetes mellitus.  Pt was dx having type 1 diabetes mellitus in May 2015.     Mallory is currently taking using a Tandem insulin pump- control IQ and DexcomG6 sensor.  Her Dexcom data are below.  Overall she reports things are going well.  She did have a pump failure 4 days ago when the pump told her she had a cartridge error.  She called the company and had a new pump sent to her over the weekend.  She got it yesterday.  When she was without her pump she followed the plan of taking Lantus and using her mealtime insulin for correction.  Overall things went pretty well.  She met with the nurse educator about a month ago and found the session to be very helpful.  As a result of this visit, she now has all of her diabetes supplies in 1 place and her wife knows where the glucagon is.  The nurse educator also noted that she has more lows than many people.  She asked her to keep that value less than 5%.  Mallory thinks that will be helpful for her in managing this problem.  She thinks her lows generally occur because she waits too long between bolusing and eating.  She tries to do her bolus 15 minutes before hand but sometimes she gets busy doing something before the meal and then will get low.  She wonders if she should set an alarm to remind her to eat.  She has no problems recognizing her lows.  She has not needed glucagon.  She plays basketball at least once a week, usually more.  She suspends her pump  and takes it off for this activity.  Usually this manages her glucoses quite well.  Sometimes she finds she is a bit high after the activity.  She has no problems with lows overnight after activity.    She saw the eye doctor in the fall and has no retinopathy.  She denies chest pain or shortness of breath.  She denies paresthesias.  She is not planning any pregnancies.       DEVICE SETTINGS  Me Profile Active at the time of upload  Start Time Basal Rate Correction Factor Carb Ratio Target BG  Midnight 0.250 u/hr 1u:50 mg/dL 1u:18.0 g 100 mg/dL  Calculated Total Daily Basal 6.0 units  Duration of Insulin: 3:00 hours  Carbohydrates: On  Max Bolus: 10 units                     Current Outpatient Medications   Medication     acetone urine (KETOSTIX) test strip     blood glucose (ACCU-CHEK MACARIO PLUS) test strip     blood glucose monitoring (ACCU-CHEK MACARIO PLUS) meter device kit     blood glucose monitoring (NO BRAND SPECIFIED) meter device kit     blood glucose monitoring (ULTRA THIN 30G) lancets     Continuous Blood Gluc  (DEXCOM G6 ) GHADA     Continuous Blood Gluc Sensor (DEXCOM G6 SENSOR) MISC     Continuous Blood Gluc Transmit (DEXCOM G6 TRANSMITTER) MISC     Glucagon (BAQSIMI TWO PACK) 3 MG/DOSE POWD     insulin glargine (LANTUS VIAL) 100 UNIT/ML vial     insulin lispro (HUMALOG) 100 UNIT/ML Cartridge     insulin pen needle 30G X 5 MM     insulin syringes, disposable, U-100 0.3 ML MISC     Wound Dressing Adhesive (MASTISOL ADHESIVE) LIQD     No current facility-administered medications for this visit.     Social history significant for being .  She works as a .    /76 (BP Location: Right arm, Patient Position: Sitting, Cuff Size: Adult Regular)   Pulse 61   Wt 76.2 kg (168 lb)   LMP 03/14/2023 (Exact Date)   BMI 24.11 kg/m    VSS  NAD  Eyes - no periorbital edema, conjunctival injection, scleral icterus  Neck - no thyromegaly  Skin - normal texture     Recent  Labs   Lab Test 03/28/23  1617 09/27/22  1641 09/27/22  1632 09/27/22  1537 02/03/20  1750 02/03/20  1745 01/24/19  0831 01/24/19  0821 11/23/16  0939 11/23/16  0931 05/29/15  1738 05/28/15  2129   A1C  --   --   --   --   --   --   --   --   --   --   --  14.0*   HEMOGLOBINA1 6.4*  --   --  6.1*  --   --    < >  --    < >  --    < >  --    TSH  --   --   --   --   --   --   --  0.96  --  1.05  --   --    T4  --   --   --   --   --   --   --   --   --  1.05  --   --    LDL  --   --   --   --   --   --   --  67  --  86  --   --    HDL  --   --   --   --   --   --   --  78  --  76  --   --    TRIG  --   --   --   --   --   --   --  36  --  40  --   --    CR  --   --  0.86  --   --  0.78  --  0.85  --  0.88   < > 0.64   MICROL  --  <12.0  --   --  17  --    < >  --    < >  --    < >  --     < > = values in this interval not displayed.     Assessment and plan:    1.  Diabetes control.  Overall she is doing very well.  She followed the pump failure plan quite successfully.  She is now back on her pump.  I think she is having too many lows and asked her to be sure to not prolong the time between bolusing and eating.  She is going to set a timer on her watch.  I made no changes in her insulin doses today.    2.  Diabetes complications.  She has been screened and has none.    3.CVD risk.  Risks 10-year risk is sufficiently low she does not need to be on a statin.  Her blood pressure is fine.    Follow-up in 6 months with Pricila Larry and 12 months with me.    Josiane Cantrell MD          Again, thank you for allowing me to participate in the care of your patient.      Sincerely,    Josiane Cantrell MD

## 2023-03-28 NOTE — PATIENT INSTRUCTIONS
Insulin Back Up Plan in case of Pump Failure      - If your insulin pump fails, your body will not have any insulin available and your blood glucose levels can get dangerously high.    - This can result in diabetic ketoacidosis making you very sick (abdominal pain, confusion, vomiting, dehydration, positive urine ketones)    - You have an active long acting basal insulin (Degludec: Tresiba / Detemir: Levemir / Glargine: Lantus / Toujeo / Semglee / Basaglar) prescription available.    -  You should pick this up from your pharmacy in case your pump fails.     -  Immediately after your pump fails and until you can  the long acting insulin, use short acting insulin (Aspart: Novolog/Fiasp / Lispro: Humalog / Glulisine:Apidra) subcutaneous injections (pen or vial and syringe) per your correction scale every 4 hours and continue to cover carbohydrates ingested.     - You can stop this 4 hourly correction after you give yourself the basal insulin dose.    - Basal insulin dose:_____6_______ units once per day    - You should also administer short acting insulin subcutaneously to cover carbohydrates and per your correction scale prior to meals.     - Contact your diabetes provider for help transitioning back to your pump when this is available.

## 2023-04-30 ENCOUNTER — HEALTH MAINTENANCE LETTER (OUTPATIENT)
Age: 33
End: 2023-04-30

## 2023-06-02 ENCOUNTER — TELEPHONE (OUTPATIENT)
Dept: ENDOCRINOLOGY | Facility: CLINIC | Age: 33
End: 2023-06-02
Payer: COMMERCIAL

## 2023-06-02 NOTE — TELEPHONE ENCOUNTER
Communication Summary: LVM and sent MyC    Appointment type: Return Diabetes  Provider: Sara Larry  Return date: See below  Speciality phone number: [phone]  Additional appointment(s) needed: N/A  Additional notes: Patient will need to have their appointment on 09/28/2023 rescheduled.    Paula Benitez on 6/2/2023 at 10:03 AM

## 2023-06-05 ENCOUNTER — TELEPHONE (OUTPATIENT)
Dept: ENDOCRINOLOGY | Facility: CLINIC | Age: 33
End: 2023-06-05
Payer: COMMERCIAL

## 2023-06-05 NOTE — TELEPHONE ENCOUNTER
Communication Summary: LVM and sent MyC    Appointment type: Return Diabetes  Provider: Sara Larry  Return date: 09/28/2023  Speciality phone number: [phone]  Additional appointment(s) needed: N/A  Additional notes: The above appointment will need to be rescheduled.     Paula Benitez on 6/5/2023 at 3:20 PM

## 2023-10-07 ENCOUNTER — HEALTH MAINTENANCE LETTER (OUTPATIENT)
Age: 33
End: 2023-10-07

## 2023-10-20 ENCOUNTER — MYC MEDICAL ADVICE (OUTPATIENT)
Dept: ENDOCRINOLOGY | Facility: CLINIC | Age: 33
End: 2023-10-20
Payer: COMMERCIAL

## 2023-10-20 DIAGNOSIS — E10.9 TYPE 1 DIABETES MELLITUS WITHOUT COMPLICATION (H): ICD-10-CM

## 2023-10-20 RX ORDER — PROCHLORPERAZINE 25 MG/1
SUPPOSITORY RECTAL
Qty: 1 EACH | Refills: 1 | Status: SHIPPED | OUTPATIENT
Start: 2023-10-20 | End: 2024-05-29

## 2023-10-20 NOTE — TELEPHONE ENCOUNTER
Continuous Blood Gluc Transmit (DEXCOM G6 TRANSMITTER) MISC   Last Written Prescription Date:   3/21/2023  Last Fill Quantity: 1,   # refills: 1  Last Office Visit :  3/28/2023  Future Office visit:  3/26/2023  1 Each, 1 Refill sent to dave Rai RN  Central Triage Red Flags/Med Refills

## 2023-12-30 DIAGNOSIS — E10.9 TYPE 1 DIABETES MELLITUS WITHOUT COMPLICATION (H): ICD-10-CM

## 2023-12-30 RX ORDER — PROCHLORPERAZINE 25 MG/1
SUPPOSITORY RECTAL
Qty: 9 EACH | Refills: 3 | Status: SHIPPED | OUTPATIENT
Start: 2023-12-30

## 2023-12-30 NOTE — TELEPHONE ENCOUNTER
Sensor  9 each 2 3/17/2023       3/28/2023  [facility] [place]    Josiane Cantrell MD  Endocrinology, Diabetes, and Metabolism    Nv: 3/26/24

## 2024-02-12 ENCOUNTER — TELEPHONE (OUTPATIENT)
Dept: ENDOCRINOLOGY | Facility: CLINIC | Age: 34
End: 2024-02-12
Payer: COMMERCIAL

## 2024-02-12 DIAGNOSIS — E10.9 TYPE 1 DIABETES MELLITUS WITHOUT COMPLICATION (H): Primary | ICD-10-CM

## 2024-02-12 DIAGNOSIS — E10.9 TYPE 1 DIABETES MELLITUS WITHOUT COMPLICATION (H): ICD-10-CM

## 2024-02-12 RX ORDER — INSULIN LISPRO 100 [IU]/ML
INJECTION, SOLUTION INTRAVENOUS; SUBCUTANEOUS
Qty: 50 ML | Refills: 3 | Status: SHIPPED | OUTPATIENT
Start: 2024-02-12 | End: 2024-02-14

## 2024-02-12 NOTE — TELEPHONE ENCOUNTER
insulin lispro (HUMALOG) 100 UNIT/ML Cartridge         Last Written Prescription Date:  11/12/22  Last Fill Quantity: 50mL,   # refills: 3  Last Office Visit : 03/28/23  Future Office visit:  03/26/24    Routing refill request to provider for review/approval because:  Insulin - refilled per clinic

## 2024-02-14 ENCOUNTER — MYC REFILL (OUTPATIENT)
Dept: ENDOCRINOLOGY | Facility: CLINIC | Age: 34
End: 2024-02-14
Payer: COMMERCIAL

## 2024-02-14 DIAGNOSIS — E10.9 TYPE 1 DIABETES MELLITUS WITHOUT COMPLICATION (H): ICD-10-CM

## 2024-02-14 RX ORDER — INSULIN LISPRO 100 [IU]/ML
INJECTION, SOLUTION INTRAVENOUS; SUBCUTANEOUS
Qty: 50 ML | Refills: 3 | Status: SHIPPED | OUTPATIENT
Start: 2024-02-14

## 2024-02-20 ENCOUNTER — LAB (OUTPATIENT)
Dept: LAB | Facility: CLINIC | Age: 34
End: 2024-02-20
Payer: COMMERCIAL

## 2024-02-20 DIAGNOSIS — E10.9 TYPE 1 DIABETES MELLITUS WITHOUT COMPLICATION (H): ICD-10-CM

## 2024-02-20 LAB
CREAT SERPL-MCNC: 0.82 MG/DL (ref 0.51–0.95)
CREAT UR-MCNC: 190 MG/DL
EGFRCR SERPLBLD CKD-EPI 2021: >90 ML/MIN/1.73M2
HBA1C MFR BLD: 6.4 % (ref 0–5.6)
MICROALBUMIN UR-MCNC: <12 MG/L
MICROALBUMIN/CREAT UR: NORMAL MG/G{CREAT}

## 2024-02-20 PROCEDURE — 82570 ASSAY OF URINE CREATININE: CPT

## 2024-02-20 PROCEDURE — 82565 ASSAY OF CREATININE: CPT

## 2024-02-20 PROCEDURE — 82043 UR ALBUMIN QUANTITATIVE: CPT

## 2024-02-20 PROCEDURE — 83036 HEMOGLOBIN GLYCOSYLATED A1C: CPT

## 2024-02-20 PROCEDURE — 36415 COLL VENOUS BLD VENIPUNCTURE: CPT

## 2024-02-27 ENCOUNTER — TELEPHONE (OUTPATIENT)
Dept: PSYCHIATRY | Facility: CLINIC | Age: 34
End: 2024-02-27

## 2024-02-27 NOTE — TELEPHONE ENCOUNTER
PSYCHIATRY CLINIC PHONE INTAKE     SERVICES REQUESTED / INTERESTED IN          Med Management    Presenting Problem and Brief History                              What would you like to be seen for? (brief description):  Patient states that they currently experiencing symptoms of anxiety and depression. Patient states that they are currently experiencing fatigue, lack of interest in usual activities, over sleeping, and are finding it tough to start and engage in tasks. Patient is currently taking Sertraline through their PCP. Patient's therapist recommended that they see a psychiatrist to help manage medication and symptoms. Patient stated that their father has been diagnosed with autism and that their sister has been diagnosed with autism and ADHD in the past three years.     Have you received a mental health diagnosis? Yes  Which one (s): Patient stated that there therapist diagnosed them with Depression.   Is there any history of developmental delay?  No   Are you currently seeing a mental health provider?  Yes            Who / month last seen:  Patient sees   Do you have mental health records elsewhere?  Yes.   Will you sign a release so we can obtain them?  Yes    Have you ever been hospitalized for psychiatric reasons?  No  Describe:  N/A.     Do you have current thoughts of self-harm?  No    Do you currently have thoughts of harming others?  No    Do you have any safety concerns? No   If yes to these, offer to reach out to a  for follow up.      Substance Use History     Do you have any history of alcohol / illicit drug use?  No  Describe:  N/A.   Have you ever received treatment for this?  No    Describe:  N/A.      Social History     Who is the patient's a guardian?  Yes    Name / number: Self.   Have you had an ACT team in last 12 months?  No  Describe: N/A.    OK to leave a detailed voicemail?  Yes.     Would you be interested in learning more about research opportunities for which you or  your child may qualify? We can connect you with a team member for more information.  Yes  If yes, send an inbasket message to Rosana Herrera    Medical/ Surgical History                                   Patient Active Problem List   Diagnosis    Hyperglycemia    Diabetes mellitus type 1 (H)    Right knee pain    Type 1 diabetes mellitus without complication (H)          Medications             Have you taken >3 psychiatric medications in your past?  No  Do you currently take 5 or more medications, including prescriptions, supplements, and other over the counter products?  No.     If YES to at least one of these questions:   As part of your evaluation in our clinic, we have specially trained pharmacists as part of your care team. Your provider would like for you to meet with one of our pharmacists to review your current and past medications, ensure your med list is up to date, and queue up any questions or concerns you have about medications. They will review all of your medications, not just for mental health, to help ensure you know what you re taking and that everything is working together.     Please schedule patient in [facility] PSYCHIATRY (Catalina De Dios or Lavonne Vazquez) for 60m MTM in any green space as virtual (video), telephone, or in person (designated in person days per Epic templates).  -Appt notes can say  Psych eval on xx/xx   -Route telephone encounter to the pharmacist who will be seeing the patient.  If patient has questions about insurance coverage or billing, please still schedule the visit and refer them to call the [facility] coordinators at [phone].    Current Outpatient Medications   Medication Sig Dispense Refill    acetone urine (KETOSTIX) test strip Use 1 strip to check urine ketones in the event of unexplained high blood glucose, or in the case of illness.90 day supply 50 strips 50 strip 11    blood glucose (ACCU-CHEK MACARIO PLUS) test strip USE TO TEST BLOOD SUGARS SIX TIMES DAILY OR AS  DIRECTED 600 each 3    blood glucose monitoring (ACCU-CHEK MACARIO PLUS) meter device kit Use to test blood sugar 6 times daily or as directed. 1 kit 0    blood glucose monitoring (ULTRA THIN 30G) lancets Use to test blood sugar 6 times daily or as directed.whatever is covered 540 each 3    Continuous Blood Gluc  (DEXCOM G6 ) GHADA Use to read blood sugars as per 's instructions. 1 each 0    Continuous Blood Gluc Sensor (DEXCOM G6 SENSOR) MISC CHANGE SENSOR EVERY 10 DAYS 9 each 3    Continuous Blood Gluc Transmit (DEXCOM G6 TRANSMITTER) MISC CHANGE TRANSMITTER EVERY 3 MONTHS 1 each 1    Glucagon (BAQSIMI TWO PACK) 3 MG/DOSE POWD Spray 1 each in nostril as needed (severe hypoglycemia) 2 each 3    insulin glargine (LANTUS VIAL) 100 UNIT/ML vial Inject 6 units subcutaneous daily ONLY IF INSULIN PUMP FAILS. 10 mL 1    insulin lispro (HUMALOG) 100 UNIT/ML Cartridge Use in pump aprox 50 units daily 50 mL 3    insulin pen needle 30G X 5 MM Use 4 pen needles daily or as directed. 120 each 12    insulin syringes, disposable, U-100 0.3 ML MISC Use for insulin ONLY IF INSULIN PUMP FAILS. 50 each 1    Wound Dressing Adhesive (MASTISOL ADHESIVE) LIQD Use for securing sensor 15 mL 1         DISPOSITION      Patient is scheduled with Coral for a new patient appointment on 5/21/24 at  9:15AM virtually. New patient paperwork was sent via email.

## 2024-03-25 NOTE — PROGRESS NOTES
Outcome for 03/25/24 11:01 AM: Data obtained via Dexcom and Tandem website**unable to download tandem pump from home. Pt refreshed kush on phone but not currently synced.     Mallory Barajas is a 34 year old female with type 1 diabetes mellitus.  Pt was dx having type 1 diabetes mellitus in May 2015.     Mallory is currently taking using a Tandem insulin pump- control IQ and DexcomG6 sensor.  Her Dexcom data are below.  She continues to have problems with lows.  About half of them occur because she bolused her meal and then forgot to eat.  The other half are usually related to exercise.  She says she is easily distracted and will undergo evaluation to see if she has ADHD that should be treated.  She is very active.  She plays basketball in the winter and kickball and other sports in the summer.  When she plays basketball, she takes the pump off for the duration of the exercise.  This prevents her from getting too low.  Because of her frequent lows, she now keeps juice boxes or fruit snacks on hand at all times.  This is really helped prevent some of the lows from happening.  Her first symptom is usually a problem with thinking.  She does not get sweaty until she is down into the 50s.  She has not needed the help of another for some time but has in the distant past.  She tells me her pump is about to go out of warrantee and she asks about next steps.  She is interested in getting the G7 sensor.  She does have glucagon at home    She did sustain an injury to an MCL last summer.  She was doing physical therapy all fall and only returned to her sports activities in January.  She is not having any pain in her leg.  Overall she feels well.  She has not seen her eye doctor in a while.  She has no problems with her feet.  Her blood pressure was checked in January and was normal.          Current Outpatient Medications   Medication    acetone urine (KETOSTIX) test strip    blood glucose (ACCU-CHEK MACARIO PLUS) test strip    blood  glucose monitoring (ACCU-CHEK MACARIO PLUS) meter device kit    blood glucose monitoring (ULTRA THIN 30G) lancets    Continuous Blood Gluc  (DEXCOM G6 ) GHADA    Continuous Blood Gluc Sensor (DEXCOM G6 SENSOR) MISC    Continuous Blood Gluc Transmit (DEXCOM G6 TRANSMITTER) MISC    Glucagon (BAQSIMI TWO PACK) 3 MG/DOSE POWD    insulin glargine (LANTUS VIAL) 100 UNIT/ML vial    insulin lispro (HUMALOG) 100 UNIT/ML Cartridge    insulin pen needle 30G X 5 MM    insulin syringes, disposable, U-100 0.3 ML MISC    Wound Dressing Adhesive (MASTISOL ADHESIVE) LIQD     No current facility-administered medications for this visit.              61  as of 3/28/2023       BP: 121/76  as of 3/28/2023      Resp: 18  as of 5/29/2015      SpO2: 100%  as of 5/29/2015      On exam she is in no acute distress      Recent Labs   Lab Test 02/20/24  0752 03/28/23  1617 09/27/22  1641 09/27/22  1632 09/27/22  1537 01/24/19  0831 01/24/19  0821 11/23/16  0939 11/23/16  0931   A1C 6.4*  --   --   --   --   --   --   --   --    HEMOGLOBINA1  --  6.4*  --   --  6.1*   < >  --    < >  --    TSH  --   --   --   --   --   --  0.96  --  1.05   T4  --   --   --   --   --   --   --   --  1.05   LDL  --   --   --   --   --   --  67  --  86   HDL  --   --   --   --   --   --  78  --  76   TRIG  --   --   --   --   --   --  36  --  40   CR 0.82  --   --  0.86  --    < > 0.85  --  0.88   MICROL <12.0  --  <12.0  --   --    < >  --    < >  --     < > = values in this interval not displayed.     Assessment and plan:    1.  Diabetes control.  She continues to have well-controlled diabetes with more hypoglycemia than I think she should have.  I encouraged her to undergo the evaluation for ADHD.  People who have ADHD and type 1 diabetes often benefit from treatment of the ADHD.  There sugars become easier to manage.  She is uncomfortable with high blood sugars and will bolus if the blood sugar goes above the upper bar on her monitor.  I recommended  that when she sees that occurring after a meal, she first consider how successfully she was able to give her insulin 20 minutes before her meal at the preceding meal.  If she did it correctly and it is more than 2 hours after the insulin injection, she can safely do a correction.  If the insulin was not delivered until the mealtime, I suggested she wait another half hour before bolusing to see if the sugar comes down.  I will send her to the nurse educator to look at different pump options.  I made no changes in her pump settings today.    2.  Diabetes complications.  She needs to see the eye doctor.  She has no history of retinopathy.  Her kidneys and nerves are fine.    3.CVD risk.  Her LDL is low and her blood pressure is normal.    Follow-up in 6 months with Sara Larry and 12 months with me.    I spent 20 minutes on the video visit with the patient (start time 3: 3 0 and end time 3: 5 0).  The visit was done at my office away from clinic.  On the same day of visit I spent an additional 10 minutes reviewing and interpreting her CGM/pump data, current labs, and doing documentation.    Josiane Cantrell MD

## 2024-03-26 ENCOUNTER — VIRTUAL VISIT (OUTPATIENT)
Dept: ENDOCRINOLOGY | Facility: CLINIC | Age: 34
End: 2024-03-26
Payer: COMMERCIAL

## 2024-03-26 DIAGNOSIS — E10.9 TYPE 1 DIABETES MELLITUS WITHOUT COMPLICATION (H): Primary | ICD-10-CM

## 2024-03-26 PROCEDURE — 99214 OFFICE O/P EST MOD 30 MIN: CPT | Mod: 95 | Performed by: INTERNAL MEDICINE

## 2024-03-26 PROCEDURE — G2211 COMPLEX E/M VISIT ADD ON: HCPCS | Mod: 95 | Performed by: INTERNAL MEDICINE

## 2024-03-26 NOTE — LETTER
3/26/2024       RE: Mallory Barajas  [address] [facility] [address]     Dear Colleague,    Thank you for referring your patient, Mallory Barajas, to the [facility]. Please see a copy of my visit note below.    3/12/2024  PATIENT LAB/IMAGING STATUS : No pending lab orders   Awa Doherty CMA              Outcome for 03/25/24 11:01 AM: Data obtained via Dexcom and Tandem website**unable to download tandem pump from home. Pt refreshed kush on phone but not currently synced.     Mallory Barajas is a 34 year old female with type 1 diabetes mellitus.  Pt was dx having type 1 diabetes mellitus in May 2015.     Mallory is currently taking using a Tandem insulin pump- control IQ and DexcomG6 sensor.  Her Dexcom data are below.  She continues to have problems with lows.  About half of them occur because she bolused her meal and then forgot to eat.  The other half are usually related to exercise.  She says she is easily distracted and will undergo evaluation to see if she has ADHD that should be treated.  She is very active.  She plays basketball in the winter and kickball and other sports in the summer.  When she plays basketball, she takes the pump off for the duration of the exercise.  This prevents her from getting too low.  Because of her frequent lows, she now keeps juice boxes or fruit snacks on hand at all times.  This is really helped prevent some of the lows from happening.  Her first symptom is usually a problem with thinking.  She does not get sweaty until she is down into the 50s.  She has not needed the help of another for some time but has in the distant past.  She tells me her pump is about to go out of warrantee and she asks about next steps.  She is interested in getting the G7 sensor.  She does have glucagon at home    She did sustain an injury to an [facility] last summer.  She was doing physical therapy all fall and only  returned to her sports activities in January.  She is not having any pain in her leg.  Overall she feels well.  She has not seen her eye doctor in a while.  She has no problems with her feet.  Her blood pressure was checked in January and was normal.          Current Outpatient Medications   Medication    acetone urine (KETOSTIX) test strip    blood glucose (ACCU-CHEK MACARIO PLUS) test strip    blood glucose monitoring (ACCU-CHEK MACARIO PLUS) meter device kit    blood glucose monitoring (ULTRA THIN 30G) lancets    Continuous Blood Gluc  (DEXCOM G6 ) GHADA    Continuous Blood Gluc Sensor (DEXCOM G6 SENSOR) MISC    Continuous Blood Gluc Transmit (DEXCOM G6 TRANSMITTER) MISC    Glucagon (BAQSIMI TWO PACK) 3 MG/DOSE POWD    insulin glargine (LANTUS VIAL) 100 UNIT/ML vial    insulin lispro (HUMALOG) 100 UNIT/ML Cartridge    insulin pen needle 30G X 5 MM    insulin syringes, disposable, U-100 0.3 ML MISC    Wound Dressing Adhesive (MASTISOL ADHESIVE) LIQD     No current facility-administered medications for this visit.              61  as of 3/28/2023       BP: 121/76  as of 3/28/2023      Resp: 18  as of 5/29/2015      SpO2: 100%  as of 5/29/2015      On exam she is in no acute distress      Recent Labs   Lab Test 02/20/24  0752 03/28/23  1617 09/27/22  1641 09/27/22  1632 09/27/22  1537 01/24/19  0831 01/24/19  0821 11/23/16  0939 11/23/16  0931   A1C 6.4*  --   --   --   --   --   --   --   --    HEMOGLOBINA1  --  6.4*  --   --  6.1*   < >  --    < >  --    TSH  --   --   --   --   --   --  0.96  --  1.05   T4  --   --   --   --   --   --   --   --  1.05   LDL  --   --   --   --   --   --  67  --  86   HDL  --   --   --   --   --   --  78  --  76   TRIG  --   --   --   --   --   --  36  --  40   CR 0.82  --   --  0.86  --    < > 0.85  --  0.88   MICROL <12.0  --  <12.0  --   --    < >  --    < >  --     < > = values in this interval not displayed.     Assessment and plan:    1.  Diabetes control.  She  continues to have well-controlled diabetes with more hypoglycemia than I think she should have.  I encouraged her to undergo the evaluation for ADHD.  People who have ADHD and type 1 diabetes often benefit from treatment of the ADHD.  There sugars become easier to manage.  She is uncomfortable with high blood sugars and will bolus if the blood sugar goes above the upper bar on her monitor.  I recommended that when she sees that occurring after a meal, she first consider how successfully she was able to give her insulin 20 minutes before her meal at the preceding meal.  If she did it correctly and it is more than 2 hours after the insulin injection, she can safely do a correction.  If the insulin was not delivered until the mealtime, I suggested she wait another half hour before bolusing to see if the sugar comes down.  I will send her to the nurse educator to look at different pump options.  I made no changes in her pump settings today.    2.  Diabetes complications.  She needs to see the eye doctor.  She has no history of retinopathy.  Her kidneys and nerves are fine.    3.CVD risk.  Her LDL is low and her blood pressure is normal.    Follow-up in 6 months with Sara Larry and 12 months with me.    I spent 20 minutes on the video visit with the patient (start time 3: 3 0 and end time 3: 5 0).  The visit was done at my office away from clinic.  On the same day of visit I spent an additional 10 minutes reviewing and interpreting her CGM/pump data, current labs, and doing documentation.    Josiane Cantrell MD        Is the patient currently in the state of [place]? YES    Visit mode:VIDEO    If the visit is dropped, the patient can be reconnected by: TELEPHONE VISIT: Phone number:   Telephone Information:   Mobile [phone]       Will anyone else be joining the visit? NO  (If patient encounters technical issues they should call [phone])    How would you like to obtain your AVS? MyChart    Are changes  needed to the allergy or medication list? No    Reason for visit: Diabetes    Marian Shaw, EMT MA

## 2024-03-26 NOTE — PROGRESS NOTES
Is the patient currently in the state of [place]? YES    Visit mode:VIDEO    If the visit is dropped, the patient can be reconnected by: TELEPHONE VISIT: Phone number:   Telephone Information:   Mobile [phone]       Will anyone else be joining the visit? NO  (If patient encounters technical issues they should call [phone])    How would you like to obtain your AVS? MyChart    Are changes needed to the allergy or medication list? No    Reason for visit: Diabetes    Marian Shaw, EMT MA

## 2024-03-26 NOTE — PATIENT INSTRUCTIONS
The exercise physiologist I mention to you is Luis Felipe Maria.  You might want to look him up on the web.    The new pumps we talked about include the tandem control IQ, the Medtronic 780 G, and the OmniPod 5.  The beta bionic is another pump that is on the market but I am not sure you would like it.

## 2024-03-29 ENCOUNTER — TELEPHONE (OUTPATIENT)
Dept: ENDOCRINOLOGY | Facility: CLINIC | Age: 34
End: 2024-03-29
Payer: COMMERCIAL

## 2024-03-29 NOTE — TELEPHONE ENCOUNTER
Left Voicemail (1st Attempt) and Sent Mychart (1st Attempt) for the patient to call back and schedule the following:    Appointment type: One Year Follow Up   Provider: Dr. Cantrell  Return date: March 2025  Specialty phone number: [phone]  Additional appointment(s) needed:   Additonal Notes:

## 2024-04-30 ENCOUNTER — OFFICE VISIT (OUTPATIENT)
Dept: EDUCATION SERVICES | Facility: CLINIC | Age: 34
End: 2024-04-30
Attending: INTERNAL MEDICINE
Payer: COMMERCIAL

## 2024-04-30 DIAGNOSIS — E10.9 TYPE 1 DIABETES MELLITUS WITHOUT COMPLICATION (H): ICD-10-CM

## 2024-04-30 PROCEDURE — G0108 DIAB MANAGE TRN  PER INDIV: HCPCS | Performed by: DIETITIAN, REGISTERED

## 2024-04-30 NOTE — PROGRESS NOTES
Diabetes Self-Management Education & Support:  Pre Pump Education    SUBJECTIVE:  Mallory Barajas presents today for education related to planning for an insulin pump related to Type 1 diabetes    Patient is being treated with:  insulin pump, tandem.  On pump for the past 8 years.  Current pump out of warranty.  Met with Dr. Cantrell one month ago and informed of a study around hypoglycemia and receiving a free pump.  Interested in learning about different pumps.  Interested in a tracking system that shares detailed food intake, bolus insulin and sugar response.      She is accompanied by self    Year of diagnosis: 2015  Referring provider:  Dr. Cantrell  Living Situation:   Employment: teacher    CURRENT INSULIN REGIMEN:  Tandem Tslim Control IQ  Total Daily Dose: 33.3 units    MONITORING:    Dexcom G6    Last pump upload April 9th:      RAPID-ACTING INSULIN CALCULATIONS:  Does patient currently count carbs? yes, counts in grams  Is instruction indicated? NO  How is insulin determined for correction: bolus calculator in pump    PROBLEM SOLVING:    Frequency and treatment of hypoglycemia: daily.  Admits to overcorrecting high sugars with frequent bolusing  Trying to set a timer.      Physical Activity:    Weight lifting mornings before work.  Competitive sports on weekends (basket ball, soccer, kickball)  What accomodations are made for exercising:  Removes pump for basketball.  Not much changed for other sports.       EDUCATION PROVIDED TODAY    Demonstrated the operation of  Omnipod 5 & Tandem  Mobi with Control-IQ,   Explained the unique features of each pump.      Reviewed nutrition for pre- and post- exercise and evaluated nutrition supplements.  Educated Mallory on nutrition guidelines for flattening post-prandial glucose spikes.    PLAN:  Interested in the Omnipod and participating in Dr. Cantrell's study around identifying hypoglycemia    Follow-up:   Will reach out to Dr. Cantrell regarding next  steps    Time Spent: 60 minutes  Encounter Type: Individual     Any diabetes medication dose changes were made via the CDE Protocol Collaborative Practice Agreement with referring provider.  A copy of this encounter was provided to patient's referring provider.

## 2024-05-15 ASSESSMENT — ANXIETY QUESTIONNAIRES
5. BEING SO RESTLESS THAT IT IS HARD TO SIT STILL: NEARLY EVERY DAY
6. BECOMING EASILY ANNOYED OR IRRITABLE: SEVERAL DAYS
7. FEELING AFRAID AS IF SOMETHING AWFUL MIGHT HAPPEN: SEVERAL DAYS
GAD7 TOTAL SCORE: 15
7. FEELING AFRAID AS IF SOMETHING AWFUL MIGHT HAPPEN: SEVERAL DAYS
GAD7 TOTAL SCORE: 15
8. IF YOU CHECKED OFF ANY PROBLEMS, HOW DIFFICULT HAVE THESE MADE IT FOR YOU TO DO YOUR WORK, TAKE CARE OF THINGS AT HOME, OR GET ALONG WITH OTHER PEOPLE?: SOMEWHAT DIFFICULT
1. FEELING NERVOUS, ANXIOUS, OR ON EDGE: MORE THAN HALF THE DAYS
IF YOU CHECKED OFF ANY PROBLEMS ON THIS QUESTIONNAIRE, HOW DIFFICULT HAVE THESE PROBLEMS MADE IT FOR YOU TO DO YOUR WORK, TAKE CARE OF THINGS AT HOME, OR GET ALONG WITH OTHER PEOPLE: SOMEWHAT DIFFICULT
GAD7 TOTAL SCORE: 15
2. NOT BEING ABLE TO STOP OR CONTROL WORRYING: NEARLY EVERY DAY
4. TROUBLE RELAXING: NEARLY EVERY DAY
3. WORRYING TOO MUCH ABOUT DIFFERENT THINGS: MORE THAN HALF THE DAYS

## 2024-05-20 ASSESSMENT — PATIENT HEALTH QUESTIONNAIRE - PHQ9
SUM OF ALL RESPONSES TO PHQ QUESTIONS 1-9: 12
SUM OF ALL RESPONSES TO PHQ QUESTIONS 1-9: 12
10. IF YOU CHECKED OFF ANY PROBLEMS, HOW DIFFICULT HAVE THESE PROBLEMS MADE IT FOR YOU TO DO YOUR WORK, TAKE CARE OF THINGS AT HOME, OR GET ALONG WITH OTHER PEOPLE: SOMEWHAT DIFFICULT

## 2024-05-21 ENCOUNTER — VIRTUAL VISIT (OUTPATIENT)
Dept: PSYCHIATRY | Facility: CLINIC | Age: 34
End: 2024-05-21
Attending: NURSE PRACTITIONER
Payer: COMMERCIAL

## 2024-05-21 DIAGNOSIS — F41.1 GAD (GENERALIZED ANXIETY DISORDER): ICD-10-CM

## 2024-05-21 DIAGNOSIS — F33.1 MAJOR DEPRESSIVE DISORDER, RECURRENT EPISODE, MODERATE (H): Primary | ICD-10-CM

## 2024-05-21 PROCEDURE — G2211 COMPLEX E/M VISIT ADD ON: HCPCS | Mod: 95 | Performed by: NURSE PRACTITIONER

## 2024-05-21 PROCEDURE — 99204 OFFICE O/P NEW MOD 45 MIN: CPT | Mod: 95 | Performed by: NURSE PRACTITIONER

## 2024-05-21 PROCEDURE — 96127 BRIEF EMOTIONAL/BEHAV ASSMT: CPT | Mod: 95 | Performed by: NURSE PRACTITIONER

## 2024-05-21 RX ORDER — BUPROPION HYDROCHLORIDE 150 MG/1
150 TABLET ORAL EVERY MORNING
Qty: 30 TABLET | Refills: 1 | Status: SHIPPED | OUTPATIENT
Start: 2024-05-21 | End: 2024-06-26

## 2024-05-21 RX ORDER — SERTRALINE HYDROCHLORIDE 100 MG/1
1.5 TABLET, FILM COATED ORAL DAILY
COMMUNITY
Start: 2024-03-19

## 2024-05-21 NOTE — PATIENT INSTRUCTIONS
-Start Wellbutrin (Bupropion)  mg daily for mood. Monitor for irritability and anxiety.   -Continue current Zoloft (Sertraline) regimen for now.    Your next appointment is scheduled on 6/18/2024 (Tue) at 8am.    Thank you for coming to the [facility] [place] [facility].     Lab Testing:  If you had lab testing today and your results are reassuring or normal they will be mailed to you or sent through [employer] within 7 days. If the lab tests need quick action we will call you with the results. The phone number we will call with results is # [phone]. If this is not the best number please call our clinic and change the number.     Medication Refills:  If you need any refills please call your pharmacy and they will contact us. Our fax number for refills is [phone].   Three business days of notice are needed for general medication refill requests.   Five business days of notice are needed for controlled substance refill requests.   If you need to change to a different pharmacy, please contact the new pharmacy directly. The new pharmacy will help you get your medications transferred.     Contact Us:  Please call [phone] during business hours (8-5:00 M-F).   If you have medication related questions after clinic hours, or on the weekend, please call [phone].     Financial Assistance [phone]   Medical Records [phone]       MENTAL HEALTH CRISIS RESOURCES:  For a emergency help, please call 911 or go to the nearest Emergency Department.     Emergency Walk-In Options:   [facility] Unit @ [facility] ([place]): [phone] - Specialized mental health emergency area designed to be calming  [facility] ([place]): [phone]  [facility] ([place]): [phone]  [facility] ([place]): [phone]    [facility] Crisis Information:   [place]: [phone]  [place]: [phone]  [place] ([place]) - Adult:  [phone]     Child: [phone]  [place] - Adult: [phone]     Child: [phone]  [place]: [phone]  List of all [facility] resources:   https://[place].gov/dhs/people-we-serve/adults/health-care/mental-health/resources/crisis-contacts.jsp    National Crisis Information:   Crisis Text Line: Text  [place]  to 459887  Suicide & Crisis Lifeline: 988  National Suicide Prevention Lifeline: 6-380-418-TALK (1-743.669.6379)       For online chat options, visit https://suicidepreventionlifeline.org/chat/  Poison Control Center: 1-405.542.5960  Trans Lifeline: 1-233.421.2307 - Hotline for transgender people of all ages  The Jack[employer] Project: 4-521-596-2091 - Hotline for LGBT youth     For Non-Emergency Support:   Fast Tracker: Mental Health & Substance Use Disorder Resources -   https://www.[place].org/

## 2024-05-21 NOTE — NURSING NOTE
Is the patient currently in the state of [place]? YES    Visit mode:VIDEO    If the visit is dropped, the patient can be reconnected by: VIDEO VISIT: Text to cell phone:   Telephone Information:   Mobile [phone]       Will anyone else be joining the visit? NO  (If patient encounters technical issues they should call [phone])    How would you like to obtain your AVS? MyChart    Are changes needed to the allergy or medication list? Pt stated no changes to allergies and Pt stated no med changes    Are refills needed on medications prescribed by this physician? NO    Reason for visit: THEODORE DAVISF

## 2024-05-21 NOTE — PROGRESS NOTES
"Virtual Visit Details    Type of service:  Video Visit     Originating Location (pt. Location): Home  Distant Location (provider location):  On-site  Platform used for Video Visit: [facility]      Outpatient Psychiatry Diagnostic Assessment       Mallory Barajas is a 34 year old nonbinary adult, assigned female at birth who uses the name Mallory and pronoun she, solis, presenting for Diagnostic Assessment. Pt wants they pronoun used today for charting purpose.      Therapist: Shannan Chaudhary (weekly)  PCP: No Ref-Primary, Physician  Other Providers: None  Referred by therapist for evaluation of depression and anxiety.     History was provided by patient who was a good historian.     Chief Complaint                                                                                                        \" My therapist recommended to see psych provider.\"     History of Present Illness                                                                                4, 4     Pertinent Background:  Depression started around 18 in college. Passive SI also started during college, no hx of SA. Cutting started in college, last cutting in 2015. No psych hospitalization. Anxiety started around high school. Medical complication includes DM I.    Previous medication trials: Prozac (ineffective, early 20's)    Most Recent History:   -Depression exacerbation in Dec 2023, trial of Zoloft started. No triggering events. Typically does not experience SAD. Unstructured time in summer is more difficult typically.  -Misses Zoloft x1/week on and off. Does not experience WD.  -Has not noted any improvement of sxs since Zoloft was started. It was increased to 150 mg daily in Jan.  -Taking Zoloft in AM. Does not feel fatigue changed since increase Zoloft.  -Significantly struggling with motivation. Fatigue also, but this has been chronic since college. Also experiences anhedonia. Denies SI, SIB or HI.  -Sleeping well. Goes to bed 9:30pm, falls asleep within 30 " min, wakes up 4:45am. Sleep throughout the night mostly. May wake up x1 for bathroom, but able to go back to sleep easily. Feels rested in AM.  -Also notes fatigue improved after working out in AM and not snoozing the alarm.  -Snoozing alarm increased anxiety as it feels starting the day behind already.  -Anxiety with racing thoughts and some catastrophic thought patterns. Anxiety around anything to do with others, but also anxious about house work, taking care of pets.  -Typically anxiety exacerbates as the day goes. Worst at HS.  -Has [facility]  ASD testing not available.  -BG has been on average 140's.  -Denies nightmares, flashbacks, depersonalization or derealization.    Denies any symptoms suggestive of hypomania or psychosis.    Medication current trials: Zoloft  Current Suicidality/Hx of Suicide Attempts: Denies both  CoCominent Medical concerns: DM I, fluctuating BG.    Medical Review of Systems      Apart from the symptoms mentioned int he HPI, the 14 point review of systems, including constitutional, HEENT, cardiovascular, respiratory, gastrointestinal, genitourinary, musculoskeletal, skin, endocrine, neurologic, hematologic and allergic is entirely negative except for fluctuating BG.    Pregnant: None. Nursing: None, Contraception: partner not sperm producing.    Past Psychiatric History     Past Diagnosis and Age of Onset: Depression:college and Anxiety:high school  Outpatient Programs [ DBT, Day Treatment, Eating Disorder Tx etc]- None   Previous  admissions:  None  Previous providers:  None, PCP managing Zoloft.  ECT: None  Suicidal ideation: started around college.   Suicide Attempt: None  Most Recent- N/A  Self-injurious behavior: Cutting started around college, last 2015.  Violent behavior: None     Substance Use History     -Reports having 2 weeks stretch of >5 drinks x3/week, but not drinking for 3-4 months at a time.  Hx of DWI in 2018 in [place]. One  black out at home.  -Cannabis: gummies 5 mg every 4 months.  -Denies any other substance use.    Past Medical/Surgical History      The patient s primary care provider is as listed in the medical record.    Allergies are listed in the medical record.       Prior hospitalization:  Past Surgical History:   Procedure Laterality Date    ARTHROSCOPY KNEE BILATERAL          The patient reports no history of head injury.   The patient reports a history of loss of consciousness.   The patient reports no history of seizures.   The patient reports no history of of other neurological concerns.        Patient Active Problem List   Diagnosis    Hyperglycemia    Diabetes mellitus type 1 (H)    Right knee pain    Type 1 diabetes mellitus without complication (H)     Current Outpatient Medications Ordered in Epic   Medication Sig Dispense Refill    acetone urine (KETOSTIX) test strip Use 1 strip to check urine ketones in the event of unexplained high blood glucose, or in the case of illness.90 day supply 50 strips 50 strip 11    blood glucose (ACCU-CHEK MACARIO PLUS) test strip USE TO TEST BLOOD SUGARS SIX TIMES DAILY OR AS DIRECTED 600 each 3    blood glucose monitoring (ACCU-CHEK MACARIO PLUS) meter device kit Use to test blood sugar 6 times daily or as directed. 1 kit 0    blood glucose monitoring (ULTRA THIN 30G) lancets Use to test blood sugar 6 times daily or as directed.whatever is covered 540 each 3    Continuous Blood Gluc  (DEXCOM G6 ) GHADA Use to read blood sugars as per 's instructions. 1 each 0    Continuous Blood Gluc Sensor (DEXCOM G6 SENSOR) MISC CHANGE SENSOR EVERY 10 DAYS 9 each 3    Continuous Blood Gluc Transmit (DEXCOM G6 TRANSMITTER) MISC CHANGE TRANSMITTER EVERY 3 MONTHS 1 each 1    Glucagon (BAQSIMI TWO PACK) 3 MG/DOSE POWD Spray 1 each in nostril as needed (severe hypoglycemia) 2 each 3    insulin glargine (LANTUS VIAL) 100 UNIT/ML vial Inject 6 units subcutaneous daily ONLY IF  "INSULIN PUMP FAILS. 10 mL 1    insulin lispro (HUMALOG) 100 UNIT/ML Cartridge Use in pump aprox 50 units daily 50 mL 3    insulin pen needle 30G X 5 MM Use 4 pen needles daily or as directed. 120 each 12    insulin syringes, disposable, U-100 0.3 ML MISC Use for insulin ONLY IF INSULIN PUMP FAILS. 50 each 1    Wound Dressing Adhesive (MASTISOL ADHESIVE) LIQD Use for securing sensor 15 mL 1     No current [facility]-ordered facility-administered medications on file.           Social History       The patient was born in [place] and raised [place] age 3. [place] cared for by 2 parents and grew up with siblings (S +3, B -2).   Denies trauma history.  The patient is  and has 0 children.    The patient s social support system includes spouse, parents, siblings, close friends. Parents, sister, some friends in [place], otherwise they are all in [place].  The patient lives with spouse and feels safe.    The patient completed high school and did not participate in special education classes. Post high school education includes completing master's degree in education.  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 Summer schedule varies by year. This year, teaching summer school.    The patient has not had involvement with the legal system.   The patient has not served in the .   Access to Gun: None    Family History      Psychiatric:  Depression: M, S, Anxiety: B, S, PU: \"psychiatric hospitalizations, but don't know dx.\" Also reports S had psychotic break and had a manic episode in 2020. ADHD: S, ASD: S, F.  Chemical Dependency:  ETOH: F, Cocaine: B  Suicide:  sister with suicide attempt  Hereditary Major Medical:  DM: MGM, M (DM II), Cardiac: PGF (stroke), F (aorta valve replacement at 68), Cx: M (skin, breast >40), Dementia: PGM, MGF.     Allergy   Patient has no known allergies.     Current Medications     Current Outpatient Medications   Medication Sig Dispense Refill    acetone urine (KETOSTIX) test " strip Use 1 strip to check urine ketones in the event of unexplained high blood glucose, or in the case of illness.90 day supply 50 strips 50 strip 11    blood glucose (ACCU-CHEK MACARIO PLUS) test strip USE TO TEST BLOOD SUGARS SIX TIMES DAILY OR AS DIRECTED 600 each 3    blood glucose monitoring (ACCU-CHEK MACARIO PLUS) meter device kit Use to test blood sugar 6 times daily or as directed. 1 kit 0    blood glucose monitoring (ULTRA THIN 30G) lancets Use to test blood sugar 6 times daily or as directed.whatever is covered 540 each 3    Continuous Blood Gluc  (DEXCOM G6 ) GHADA Use to read blood sugars as per 's instructions. 1 each 0    Continuous Blood Gluc Sensor (DEXCOM G6 SENSOR) MISC CHANGE SENSOR EVERY 10 DAYS 9 each 3    Continuous Blood Gluc Transmit (DEXCOM G6 TRANSMITTER) MISC CHANGE TRANSMITTER EVERY 3 MONTHS 1 each 1    Glucagon (BAQSIMI TWO PACK) 3 MG/DOSE POWD Spray 1 each in nostril as needed (severe hypoglycemia) 2 each 3    insulin glargine (LANTUS VIAL) 100 UNIT/ML vial Inject 6 units subcutaneous daily ONLY IF INSULIN PUMP FAILS. 10 mL 1    insulin lispro (HUMALOG) 100 UNIT/ML Cartridge Use in pump aprox 50 units daily 50 mL 3    insulin pen needle 30G X 5 MM Use 4 pen needles daily or as directed. 120 each 12    insulin syringes, disposable, U-100 0.3 ML MISC Use for insulin ONLY IF INSULIN PUMP FAILS. 50 each 1    Wound Dressing Adhesive (MASTISOL ADHESIVE) LIQD Use for securing sensor 15 mL 1          Vitals                                                                                                                        3, 3     There were no vitals taken for this visit.        Mental Status Exam                                                                                   9, 14 cog        Alertness: alert  and oriented  Appearance:  Casually dressed and Adequately groomed  Behavior/Demeanor: cooperative and calm, with good  eye contact   Speech: regular rate  "and rhythm  Mood :  \"okay\" and anhedonia  Affect:  slightly subdued and restricted ; was congruent to mood; was congruent to content  Thought Process (Associations):  Logical, Linear, and Goal directed  Thought process (Rate):  Normal  Thought content:  no overt psychosis, denies suicidal ideation, intent or thoughts, and patient does not appear to be responding to internal stimuli  Perception:  Reports none;  Denies auditory hallucinations, visual hallucinations, depersonalization, and derealization  Attention/Concentration:  Normal  Memory:  Immediate recall intact, Short-term memory intact, and Long-term memory intact  Language: intact  Fund of Knowledge/Intelligence:  Average  Abstraction:  Normal  Insight:  Good  Judgment:  Good  Cognition: (6) does  appear grossly intact; formal cognitive testing was not done    Physical Exam     Motor activity/EPS:  Normal  Psychomotor: normal or unremarkable    Labs and Results      Pertinent findings on review include: Review of records with relevant information reported in the HPI.  Reviewed pt's past medical record and obtained collateral information.    [place] PRESCRIPTION MONITORING PROGRAM [] was checked today:  not using controlled substances.    Answers submitted by the patient for this visit:  Patient Health Questionnaire (Submitted on 5/20/2024)  If you checked off any problems, how difficult have these problems made it for you to do your work, take care of things at home, or get along with other people?: Somewhat difficult  PHQ9 TOTAL SCORE: 12  RIANNA-7 (Submitted on 5/15/2024)  RIANNA 7 TOTAL SCORE: 15        5/20/2024    10:43 AM   PHQ   PHQ-9 Total Score 12   Q9: Thoughts of better off dead/self-harm past 2 weeks Several days   F/U: Thoughts of suicide or self-harm No   F/U: Safety concerns No         Recent Labs   Lab Test 02/20/24  0752 09/27/22  1632 02/03/20  1745   CR 0.82 0.86 0.78   GFRESTIMATED >90 >90 >90     Recent Labs   Lab Test 01/24/19  0821 " 11/23/16  0931   AST 10  --    ALT 13 12       PSYCHOTROPIC DRUG INTERACTIONS:    Wellbutrin---Zoloft: Concurrent use of BUPROPION and SEIZURE THRESHOLD LOWERING AGENTS may result in increased risk of seizures.   MANAGEMENT:  routine monitoring and pt is aware of the risks.    Impression/Assessment      Mallory Barajas is a 34 year old adult  who presents for diagnostic assessment and establishment of care. Pt appears slightly subdued and restricted, but not anxious, denies SI, SIB or HI today. PHQ 9 moderately and RIANNA 7 significantly elevated today. Pt noted Zoloft has not been effective so far and struggles with anhedonia and lack of motivation along with chronic fatigue. Discussed possible trial of Wellbutrin, maximizing Zoloft, SNRI trials and pt decided to add Wellbutrin  mg daily while continuing on current Zoloft for now. Discussed in depth about seizure risk and alcohol use with Wellbutrin. Pt appears to have possible binge drinking patterns. But also noted this is to help with depression and anxiety. Hoping mood and anxiety improvement will reduce ETOH use. Also discussed Wellbutrin may help with ADHD sxs while pt is being tested for ADHD. If Wellbutrin causes anxiety exacerbation, may consider adding Gabapentin since pt has not seen any effects from Zoloft currently. Pt declined Zoloft refill today.      Diagnosis                                                                   MDD  RIANNA  R/out binge drinking    Treatment Recommendation & Plan       Medication Ordered/Consults/Labs/tests Ordered:     Medication:   -Start Wellbutrin (Bupropion)  mg daily for mood. Monitor for irritability and anxiety.   -Continue current Zoloft (Sertraline) regimen for now.  OTC Recommendations: none  Lab Orders:  none  Referrals: none  Release of Information: therapist in next visit  Future Treatment Considerations: per symptoms. Gabapentin if anxiety exacerbates with wellbutrin.  Return for Follow Up: in 1  month    -Discussed safety plan for suicidal thoughts  -Discussed plan for suicidality  -Discussed available emergency services  -Patient agrees with the treatment plan  -Encouraged to continue outpatient therapy to gain more coping mechanism for stress.      Treatment Risk Statement: Discussed with the patient my impressions, as well as recommended studies. I educated patient on the differential diagnosis and prognosis. I discussed with the patient the risks and benefits of medications versus no interventions, including efficacy, dose, possible side effects and length of treatment and the importance of medication compliance.  The patient understands the risks, benefits, adverse effects and alternatives. Agrees to treatment with the capacity to do so. No medical contraindications to treatment. The patient also understands the risks of using street drugs or alcohol.     CRISIS NUMBERS:   Provided routinely in AVS.    Diagnosis or treatment significantly limited by social determinants of health.    The longitudinal plan of care for the diagnosis(es)/condition(s) as documented were addressed during this visit. Due to the added complexity in care, I will continue to support Mallory in the subsequent management and with ongoing continuity of care.      Coral Adam, RANJEET,  5/21/2024

## 2024-05-29 ENCOUNTER — MYC REFILL (OUTPATIENT)
Dept: ENDOCRINOLOGY | Facility: CLINIC | Age: 34
End: 2024-05-29
Payer: COMMERCIAL

## 2024-05-29 DIAGNOSIS — E10.9 TYPE 1 DIABETES MELLITUS WITHOUT COMPLICATION (H): ICD-10-CM

## 2024-05-29 RX ORDER — PROCHLORPERAZINE 25 MG/1
SUPPOSITORY RECTAL
Qty: 1 EACH | Refills: 1 | Status: SHIPPED | OUTPATIENT
Start: 2024-05-29 | End: 2024-09-10

## 2024-06-24 ENCOUNTER — OFFICE VISIT (OUTPATIENT)
Dept: EDUCATION SERVICES | Facility: CLINIC | Age: 34
End: 2024-06-24
Payer: COMMERCIAL

## 2024-06-24 DIAGNOSIS — E10.9 TYPE 1 DIABETES MELLITUS WITHOUT COMPLICATION (H): Primary | ICD-10-CM

## 2024-06-24 PROCEDURE — G0108 DIAB MANAGE TRN  PER INDIV: HCPCS | Performed by: DIETITIAN, REGISTERED

## 2024-06-24 NOTE — PROGRESS NOTES
Outcome for 06/24/24 12:58 PM: Data uploaded on Dexcom  Breanna Vázquez LPN   Outcome for 06/24/24 12:58 PM: [employer] message sent  Breanna Vázquez LPN   Outcome for 07/09/24 10:19 AM: Data obtained via Dexcom website  Breanna Vázquez LPN   Outcome for 07/09/24 11:32 AM: Left Voicemail   Breanna Vázquez LPN   Outcome for 07/09/24 2:10 PM: Data obtained via [employer] website  Breanna Vázquez LPN

## 2024-06-24 NOTE — PROGRESS NOTES
DIABETES SELF-MANAGEMENT EDUCATION & SUPPORT    Presents for:  Omnipod 5 start.  Previously on the Tandem Control IQ      Omnipod Insulin Pump Training:  BG at beginning of appointment: 157 mg/dL  BG at end of appointment: 162 mg/dL  On Tandem Insulin Pump, which was stopped prior to starting a pod.    Introduction to Controller and Pod  Controller:  Basic settings - ID, personalized lock screen,    Basal settings- max basal, basal rates, temp basal   Bolus settings- target BG, I:C ratio, correction factor, reverse correction-off, duration of insulin action, extended bolus-off, max bolus.    POD:  Setting up and changing pod, using room temp insulin, fill syringe- minimum and maximum amounts, DO NOT prefill pods, site selection, rotation and prep, automated cannula insertion - check infusion site/viewing window and pink slide insert, when to change the pod.     Status and Home screen actions:   Status bar- menu icon, notifications/alarms, system mode indicator (automated/manual  Tabs - Dashboard, insulin, pod info  Bolus - use sensor glucose   Pause insulin  My records- insulin/BG/Alarm/carbohydrate, all history, event/day functionality  Settings- edit basal program, edit bolus, reminders  Advanced features- extended bolus, temp basal    Reminders/Alerts:  pod expiration, low reservoir, Advisory and hazard alarms Yes     Troubleshooting: hypoglycemia, sick day management, hyperglycemia and DKA prevention Yes     [employer] and uploading pump Yes Connected to our healthcare account    Additional topics: 24/7 Customer Care helpline [phone], removal for xray,CT, and MRI, back up plan, when and how to order pump supplies, when to call a healthcare provider. Yes      ASSESSMENT:  Mallory successfully started the Omnipod 5 in automated mode.  She is away the first 3 pods her sugars may be higher than normal.  Mallory's current basal rate is lower than expected, however Mallory is very active.  Will re-evaluate in 24 hours if  this needs to be adjusted as the system will only use the basal rate for the first pod.    Insulin pump was programmed according to the Pump Start Orders signed by Dr. Cantrell    Patient was able to start insulin pump today without difficulty. Yes       PLAN:  See Care Plan for co-developed, patient-stated behavior change goals.    Follow up with diabetes educator in 24 hours.  Follow up with diabetes educator in 2 weeks.  Follow up with endocrinology in 2 weeks.      Veronica Joseph RDN, WAYNE, [facility]  Dietitian and Diabetes  - Endocrinology  [facility] [place]    Time Spent: 90 minutes  Encounter Type: Individual    Any diabetes medication dose changes were made via the CDE Protocol and Collaborative Practice Agreement with the patient's endocrinology provider. A copy of this encounter was shared with the provider.

## 2024-06-25 ENCOUNTER — TELEPHONE (OUTPATIENT)
Dept: EDUCATION SERVICES | Facility: CLINIC | Age: 34
End: 2024-06-25
Payer: COMMERCIAL

## 2024-06-25 NOTE — TELEPHONE ENCOUNTER
Diabetes Educator Note for 24 hour follow up after Omnipod Start.    LM. Reviewed Mallory's [place] reports for the Omnipod 5 and Dexcom G6 sensor.  Overall, she is doing well.  One low blood sugar yesterday afternoon.  Encouraged Mallory to use activity mode for exercise if the low was due to exercise.  Also requested she confirm her follow up visit scheduled for July 8th @ 1 pm works for her.    Will await a call back or reply to [employer].    Reports:            Veronica Joseph RDN, LD, [facility]  Dietitian and Diabetes  - Endocrinology  [facility] [place]

## 2024-06-26 ENCOUNTER — VIRTUAL VISIT (OUTPATIENT)
Dept: PSYCHIATRY | Facility: CLINIC | Age: 34
End: 2024-06-26
Attending: NURSE PRACTITIONER
Payer: COMMERCIAL

## 2024-06-26 DIAGNOSIS — F33.1 MAJOR DEPRESSIVE DISORDER, RECURRENT EPISODE, MODERATE (H): Primary | ICD-10-CM

## 2024-06-26 PROCEDURE — G2211 COMPLEX E/M VISIT ADD ON: HCPCS | Mod: 95 | Performed by: NURSE PRACTITIONER

## 2024-06-26 PROCEDURE — 96127 BRIEF EMOTIONAL/BEHAV ASSMT: CPT | Mod: 95 | Performed by: NURSE PRACTITIONER

## 2024-06-26 PROCEDURE — 99214 OFFICE O/P EST MOD 30 MIN: CPT | Mod: 95 | Performed by: NURSE PRACTITIONER

## 2024-06-26 RX ORDER — BUPROPION HYDROCHLORIDE 150 MG/1
300 TABLET ORAL EVERY MORNING
Qty: 60 TABLET | Refills: 1 | Status: SHIPPED | OUTPATIENT
Start: 2024-06-26 | End: 2024-07-24 | Stop reason: DRUGHIGH

## 2024-06-26 ASSESSMENT — PATIENT HEALTH QUESTIONNAIRE - PHQ9
SUM OF ALL RESPONSES TO PHQ QUESTIONS 1-9: 10
SUM OF ALL RESPONSES TO PHQ QUESTIONS 1-9: 10
10. IF YOU CHECKED OFF ANY PROBLEMS, HOW DIFFICULT HAVE THESE PROBLEMS MADE IT FOR YOU TO DO YOUR WORK, TAKE CARE OF THINGS AT HOME, OR GET ALONG WITH OTHER PEOPLE: SOMEWHAT DIFFICULT

## 2024-06-26 NOTE — NURSING NOTE
Is the patient currently in the state of [place]? YES    Visit mode:VIDEO    If the visit is dropped, the patient can be reconnected by: VIDEO VISIT: Send to e-mail at: [email or web address]    Will anyone else be joining the visit? NO  (If patient encounters technical issues they should call [phone]    How would you like to obtain your AVS? MyChart    Are changes needed to the allergy or medication list? No    Are refills needed on medications prescribed by this physician? NO    Reason for visit: No chief complaint on file.    Mili DAVISF

## 2024-06-26 NOTE — PROGRESS NOTES
"Virtual Visit Details    Type of service:  Video Visit     Originating Location (pt. Location): {video visit patient location:819695::\"Home\"}  {PROVIDER LOCATION On-site should be selected for visits conducted from your clinic location or adjoining [facility] hospital, academic office, or other nearby [facility] building. Off-site should be selected for all other provider locations, including home:419166}  Distant Location (provider location):  {virtual location provider:509091}  Platform used for Video Visit: {Virtual Visit Platforms:617822::\"[employer]\"}    "

## 2024-06-26 NOTE — PATIENT INSTRUCTIONS
-Increase Wellbutrin XL to 300 mg daily .Monitor for irritability, appetite suppression (thus blood sugar change) and anxiety.   -Continue on current Zoloft (Sertraline) regimen for now.    Your next appointment is scheduled on 7/24/2024 (Wed) at 2pm.    Thank you for coming to the [facility].     Lab Testing:  If you had lab testing today and your results are reassuring or normal they will be mailed to you or sent through [employer] within 7 days. If the lab tests need quick action we will call you with the results. The phone number we will call with results is # [phone]. If this is not the best number please call our clinic and change the number.     Medication Refills:  If you need any refills please call your pharmacy and they will contact us. Our fax number for refills is [phone].   Three business days of notice are needed for general medication refill requests.   Five business days of notice are needed for controlled substance refill requests.   If you need to change to a different pharmacy, please contact the new pharmacy directly. The new pharmacy will help you get your medications transferred.     Contact Us:  Please call [phone] during business hours (8-5:00 M-F).   If you have medication related questions after clinic hours, or on the weekend, please call [phone].     Financial Assistance [phone]   Medical Records [phone]       MENTAL HEALTH CRISIS RESOURCES:  For a emergency help, please call 911 or go to the nearest Emergency Department.     Emergency Walk-In Options:   EmPATH Unit @ [facility] ([place]): [phone] - Specialized mental health emergency area designed to be calming  [facility] ([place]): [phone]  [facility] ([place]): [phone]  [facility] ([place]): [phone]    [facility] Crisis Information:   Keshav: [phone]  Yared:  [phone]  Alexa (COPE) - Adult: [phone]     Child: [phone]  Efren - Adult: [phone]     Child: [phone]  [place]: [phone]  List of all [facility] resources:   https://[place].gov/dhs/people-we-serve/adults/health-care/mental-health/resources/crisis-contacts.jsp    National Crisis Information:   Crisis Text Line: Text  [place]  to 101195  Suicide & Crisis Lifeline: 988  National Suicide Prevention Lifeline: 0-085-724-TALK (1-908.274.6090)       For online chat options, visit https://suicidepreventionlifeline.org/chat/  Poison Control Center: 1-730.426.4389  Trans Lifeline: 1-437.541.7204 - Hotline for transgender people of all ages  The Jacky Project: 1-860-447-3694 - Hotline for LGBT youth     For Non-Emergency Support:   Fast Tracker: Mental Health & Substance Use Disorder Resources -   https://www.myhomemoveckMindSet Rxn.org/

## 2024-06-26 NOTE — PROGRESS NOTES
Virtual Visit Details    Type of service:  Video Visit     Originating Location (pt. Location): Home  Distant Location (provider location):  Off-site  Platform used for Video Visit: [facility]      Psychiatry Clinic Progress Note                                                                  Patient Name: Mallory Barajas  YOB: 1990  MRN: [id number]  Date of Service:  06/26/2024  Last Seen:5/21/2024    Mallory Barajas is a 34 year old nonbinary adult, assigned female at birth who uses the name Mallory and pronoun she, they. Pt wants they pronoun used today for charting purpose.     At that time,     Medication Ordered/Consults/Labs/tests Ordered:      Medication:   -Start Wellbutrin (Bupropion)  mg daily for mood. Monitor for irritability and anxiety.   -Continue current Zoloft (Sertraline) regimen for now.  OTC Recommendations: none  Lab Orders:  none  Referrals: none  Release of Information: therapist in next visit  Future Treatment Considerations: per symptoms. Gabapentin if anxiety exacerbates with wellbutrin.  Return for Follow Up: in 1 month      Pertinent Background:  Depression started around 18 in college. Passive SI also started during college, no hx of SA. Cutting started in college, last cutting in 2015. No psych hospitalization. Anxiety started around high school. Medical complication includes DM I.     Previous medication trials: Prozac (ineffective, early 20's)    Therapist: Shannan Chaudhary (weekly)     Interim History                                                                                                        4, 4     On 5/22/2024, pt sent a [employer] message asking if it impacts performance on ADHD assessment. Discussed Wellbutrin is used for treatment of ADHD, takes 3-4 weeks for effects.    Since the last visit,  -Started Wellbutrin soon after last visit.  -ADHD assessment was rescheduled to August due to insurance.  -Noted improved fatigue and less brain fog. Has not noted significant  mood improvement, denies SI, SIB or HI.  -But wondering typically, experience worsening mood and anxiety due to unsttructured time and if this is contributing to mood not improving.  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  -Denies any ADR with Wellbutrin trial. Did not notice appetite suppression.  -Still sleeping well.Goes to bed 9:30pm, falls asleep within 30 min, wakes up 4:45am. Sleep throughout the night mostly. May wake up x1 for bathroom, but able to go back to sleep easily. Feels rested in AM.   -Had drink past weekend. Drank 8 drinks. Drinks wine, seltzer and beer typically to regulate BG elevation better.  -Wants to increase Wellbutrin further. Also feels this will give them reason not to drink.    Denies any symptoms suggestive of hypomania or psychosis.    Current Suicidality/Hx of Suicide Attempts: Denies both  CoCominent Medical concerns: DM I, fluctuating BG.    Medication Side Effects: The patient denies all medication side effects.      Medical Review of Systems     Apart from the symptoms mentioned int he HPI, the 14 point review of systems, including constitutional, HEENT, cardiovascular, respiratory, gastrointestinal, genitourinary, musculoskeletal, integumentary, endocrine, neurological, hematologic and allergic is entirely negative.    Pregnant: None. Nursing: None, Contraception: partner not sperm producing.     Substance Use     -Reports having 2 weeks stretch of >5 drinks x3/week, but not drinking for 3-4 months at a time.  Goes to bed 9:30pm, falls asleep within 30 min, wakes up 4:45am. Sleep throughout the night mostly. May wake up x1 for bathroom, but able to go back to sleep easily. Feels rested in AM. Hx of DWI in 2018 in [place]. One black out at home.  -Cannabis: gummies 5 mg every 4 months.  -Denies any other substance use.    Social/ Family History                                  [per patient report]                                 1ea,1ea     -Living arrangements: lives with  spouse and feels safe.   -Social Support: spouse, parents, siblings, close friends. Parents, sister, some friends in [place], otherwise they are all in [place].   -Access to gun: Denies  -Denies trauma history.   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 This year, teaching summer school.      Allergy                                Patient has no known allergies.    Current Medications                                                                                                       Current Outpatient Medications   Medication Sig Dispense Refill    acetone urine (KETOSTIX) test strip Use 1 strip to check urine ketones in the event of unexplained high blood glucose, or in the case of illness.90 day supply 50 strips 50 strip 11    blood glucose (ACCU-CHEK MACARIO PLUS) test strip USE TO TEST BLOOD SUGARS SIX TIMES DAILY OR AS DIRECTED 600 each 3    blood glucose monitoring (ACCU-CHEK MACARIO PLUS) meter device kit Use to test blood sugar 6 times daily or as directed. 1 kit 0    blood glucose monitoring (ULTRA THIN 30G) lancets Use to test blood sugar 6 times daily or as directed.whatever is covered 540 each 3    buPROPion (WELLBUTRIN XL) 150 MG 24 hr tablet Take 1 tablet (150 mg) by mouth every morning 30 tablet 1    Continuous Blood Gluc  (DEXCOM G6 ) GHADA Use to read blood sugars as per 's instructions. 1 each 0    Continuous Blood Gluc Sensor (DEXCOM G6 SENSOR) MISC CHANGE SENSOR EVERY 10 DAYS 9 each 3    Continuous Glucose Transmitter (DEXCOM G6 TRANSMITTER) MISC CHANGE TRANSMITTER EVERY 3 MONTHS 1 each 1    Glucagon (BAQSIMI TWO PACK) 3 MG/DOSE POWD Spray 1 each in nostril as needed (severe hypoglycemia) 2 each 3    Insulin Disposable Pump (OMNIPOD 5 G6 INTRO, GEN 5,) KIT 1 kit as needed 1 kit 0    Insulin Disposable Pump (OMNIPOD 5 G6 PODS, GEN 5,) MISC 1 pod every 3 days 30 each 4    insulin glargine (LANTUS VIAL) 100 UNIT/ML vial Inject 6 units  "subcutaneous daily ONLY IF INSULIN PUMP FAILS. 10 mL 1    insulin lispro (HUMALOG) 100 UNIT/ML Cartridge Use in pump aprox 50 units daily 50 mL 3    insulin pen needle 30G X 5 MM Use 4 pen needles daily or as directed. 120 each 12    insulin syringes, disposable, U-100 0.3 ML MISC Use for insulin ONLY IF INSULIN PUMP FAILS. 50 each 1    sertraline (ZOLOFT) 100 MG tablet Take 1.5 tablets by mouth daily      Wound Dressing Adhesive (MASTISOL ADHESIVE) LIQD Use for securing sensor 15 mL 1         Vitals                                                                                                                       3, 3   There were no vitals taken for this visit.        Mental Status Exam                                                                                   9, 14 cog      Alertness: alert  and oriented  Appearance:  Casually dressed and Adequately groomed  Behavior/Demeanor: cooperative, pleasant, and calm, with good  eye contact   Speech: regular rate and rhythm  Mood :  \"good\"  Affect:  mostly euthymic ; was congruent to mood; was congruent to content  Thought Process (Associations):  Logical, Linear, and Goal directed  Thought process (Rate):  Normal  Thought content:  no overt psychosis, denies suicidal ideation, intent or thoughts, and patient does not appear to be responding to internal stimuli  Perception:  Reports none;  Denies auditory hallucinations and visual hallucinations  Attention/Concentration:  Normal  Memory:  Immediate recall intact and Short-term memory intact  Language: intact  Fund of Knowledge/Intelligence:  Average  Abstraction:  Normal  Insight:  Good  Judgment:  Good  Cognition: (6) does  appear grossly intact; formal cognitive testing was not done    Physical Exam     Motor activity/EPS:  Normal  Psychomotor: normal or unremarkable    Labs and Results      Pertinent findings on review include: Review of records  with relevant information reported in the HPI.  Reviewed pt's " past medical record and obtained collateral information.      [place] PRESCRIPTION MONITORING PROGRAM [] was checked today:  not using controlled substances.    Answers submitted by the patient for this visit:  Patient Health Questionnaire (Submitted on 6/26/2024)  If you checked off any problems, how difficult have these problems made it for you to do your work, take care of things at home, or get along with other people?: Somewhat difficult  PHQ9 TOTAL SCORE: 10          5/20/2024    10:43 AM   PHQ   PHQ-9 Total Score 12   Q9: Thoughts of better off dead/self-harm past 2 weeks Several days   F/U: Thoughts of suicide or self-harm No   F/U: Safety concerns No       RIANNA 7 Today: N/A      5/15/2024     5:05 PM   RIANNA-7 SCORE   Total Score 15 (severe anxiety)   Total Score 15       Recent Labs   Lab Test 02/20/24  0752 09/27/22  1632 02/03/20  1745   CR 0.82 0.86 0.78   GFRESTIMATED >90 >90 >90     Recent Labs   Lab Test 01/24/19  0821 11/23/16  0931   AST 10  --    ALT 13 12     PSYCHOTROPIC DRUG INTERACTIONS:    Wellbutrin---Zoloft: Concurrent use of BUPROPION and SEIZURE THRESHOLD LOWERING AGENTS may result in increased risk of seizures.   MANAGEMENT:  routine monitoring and pt is aware of the risks.    Impression/Assessment      Mallory Barajas is a 34 year old adult  who presents for med management follow up.  Pt appears mostly stable in their mood and anxiety, denies SI, SIB or HI during the appointment.  PHQ 9 moderately elevated today. Pt noted improvement of fatigue and brain fog with Wellbutrin start, not experiencing any ADR. Pt noted drinking heavily past weekend, but otherwise no drinking. Discussed risk of alcohol use with Wellbutrin, and use of Gabapentin possibly. However, pt feels increase in Wellbutrin would help them reduce alcohol due to risk. Pt decided to increase Wellbutrin  mg daily and continue current Zoloft regimen for now.    Diagnosis                                                                     MDD  RIANNA  R/out binge drinking    Treatment Recommendation & Plan       Medication Ordered/Consults/Labs/tests Ordered:     Medication:   -Increase Wellbutrin XL to 300 mg daily .Monitor for irritability, appetite suppression (thus blood sugar change) and anxiety.   -Continue on current Zoloft (Sertraline) regimen for now.  OTC Recommendations: none  Lab Orders:  none  Referrals: none  Release of Information: none  Future Treatment Considerations: Per symptoms. Gabapentin?   Return for Follow Up: in 4 weeks    -Discussed safety plan for suicidal thoughts  -Discussed plan for suicidality  -Discussed available emergency services  -Patient agrees with the treatment plan  -Encouraged to continue outpatient therapy to gain more coping mechanism for stress.    Treatment Risk Statement: Discussed with the patient my impressions, as well as recommended studies. I educated patient on the differential diagnosis and prognosis. I discussed with the patient the risks and benefits of medications versus no interventions, including efficacy, dose, possible side effects and length of treatment and the importance of medication compliance.  The patient understands the risks, benefits, adverse effects and alternatives. Agrees to treatment with the capacity to do so. No medical contraindications to treatment. The patient also understands the risks of using street drugs or alcohol.     CRISIS NUMBERS:   Provided routinely in AVS.      Diagnosis or treatment significantly limited by social determinants of health.    The longitudinal plan of care for the diagnosis(es)/condition(s) as documented were addressed during this visit. Due to the added complexity in care, I will continue to support Mallory in the subsequent management and with ongoing continuity of care.        Coral Adam, RANJEET,  06/26/2024

## 2024-07-07 ENCOUNTER — HEALTH MAINTENANCE LETTER (OUTPATIENT)
Age: 34
End: 2024-07-07

## 2024-07-09 ENCOUNTER — TELEPHONE (OUTPATIENT)
Dept: ENDOCRINOLOGY | Facility: CLINIC | Age: 34
End: 2024-07-09
Payer: COMMERCIAL

## 2024-07-09 NOTE — TELEPHONE ENCOUNTER
Called patient and left voicemail. Patient has an appointment on 7/11/24. Need patient to upload their Tandem device to site for provider to review prior to their appointment.    Breanna Vázquez LPN 07/09/24 11:31 AM

## 2024-07-11 ENCOUNTER — VIRTUAL VISIT (OUTPATIENT)
Dept: ENDOCRINOLOGY | Facility: CLINIC | Age: 34
End: 2024-07-11
Payer: COMMERCIAL

## 2024-07-11 DIAGNOSIS — E10.9 TYPE 1 DIABETES MELLITUS WITHOUT COMPLICATION (H): ICD-10-CM

## 2024-07-11 PROCEDURE — 99214 OFFICE O/P EST MOD 30 MIN: CPT | Mod: 95 | Performed by: PHYSICIAN ASSISTANT

## 2024-07-11 RX ORDER — INSULIN GLARGINE 100 [IU]/ML
INJECTION, SOLUTION SUBCUTANEOUS
Qty: 10 ML | Refills: 1 | Status: SHIPPED | OUTPATIENT
Start: 2024-07-11

## 2024-07-11 RX ORDER — GLUCAGON 3 MG/1
3 POWDER NASAL PRN
Qty: 2 EACH | Refills: 3 | Status: SHIPPED | OUTPATIENT
Start: 2024-07-11

## 2024-07-11 ASSESSMENT — PAIN SCALES - GENERAL: PAINLEVEL: NO PAIN (0)

## 2024-07-11 NOTE — PROGRESS NOTES
Time of start: 2:09 PM   Time of end: 2:24 PM   Total duration of video visit:15 minutes.  Providers location: offsite.  Patients location: [place].    [facility]  Mallory Barajas is a 34 year old female with type 1 diabetes mellitus.  Video visit today for diabetes follow up.  Mallory recently started using an OmniPod 5 insulin pump. She is happy with this pump.  She has been seeing Veronica Joseph for diabetes education.  Mallory had a virtual visit with Dr. Cantrell in March 2024.  Pt was dx having type 1 diabetes mellitus in May 2015. No hx of retinopathy, nephropathy or neuropathy.  Mallory is currently using an OmniPod5 insulin pump and DexcomG6 sensor.  Her current basal insulin rate is set at 0.25 units/hr.   Her I/C ratio is 1:15 and CF 58.  Most recent A1C was 6.4 % on 2/20/2024.   I reviewed and scanned her OmniPod insulin pump and Dexcom sensor download data in her note below.  She continues to have some hypoglycemia with exercise.  She is very active in sports.  She will also at times give herself bolus insulin and then forget to eat and then have hypoglycemia.  She will be undergoing testing for ADHD.  She has not used the activity mode feature on her insulin pump.  Average glucose 141.  Blood sugar in target range 70% of the time and below target 8% of the time.  On ROS today, Mallory eats healthy and is very active with sports. She continues to teach and .  She denies blurred vision, n/v, SOB at rest or cough. No chest pain, abd pain, diarrhea, dysuria or hematuria.  She denies numbness or tingling in her feet or hands.  Mallory denies any foot ulcers at this time.    DIABETES CARE:   Retinopathy: none.  She is due for ophthalmology exam-ophthalmology referral placed today.  Nephropathy: none; urine microalbuminuria negative in February 2024.  Neuropathy: none.  Foot exam: no exam today.  Lipids: LDL 80 in January 2024.  CAD: no.  Mental mera: no depression.  Will be undergoing testing for ADHD.  Insulin: OmniPod 5 insulin  pump.  Testing: DexcomG6 sensor.  Hypoglycemia: Baqsimi renewed today.  Back up insulin: RX for Lantus vial sent to pharmacy today to use in case of insulin pump failure.            ROS  Please see under HPI.    Allergies  No Known Allergies    MEDICATIONS:  Reviewed with pt today.    Family History  Mother with hx of type 2 diabetes.  First cousin with hx of type 1 diabetes.    Social History  Smoke: none.  ETOH: rare.  Occupation: Math and .      Past Medical History  Past Medical History:   Diagnosis Date    NO ACTIVE PROBLEMS (aka NONE)      Past Surgical History:   Procedure Laterality Date    ARTHROSCOPY KNEE BILATERAL         Physical Exam    No exam today.    RESULTS  Creatinine   Date Value Ref Range Status   02/20/2024 0.82 0.51 - 0.95 mg/dL Final   02/03/2020 0.78 0.52 - 1.04 mg/dL Final     GFR Estimate   Date Value Ref Range Status   02/20/2024 >90 >60 mL/min/1.73m2 Final   02/03/2020 >90 >60 mL/min/[1.73_m2] Final     Comment:     Non  GFR Calc  Starting 12/18/2018, serum creatinine based estimated GFR (eGFR) will be   calculated using the Chronic Kidney Disease Epidemiology Collaboration   (CKD-EPI) equation.       Hemoglobin A1C   Date Value Ref Range Status   02/20/2024 6.4 (H) 0.0 - 5.6 % Final     Comment:     Normal <5.7%   Prediabetes 5.7-6.4%    Diabetes 6.5% or higher     Note: Adopted from ADA consensus guidelines.   05/28/2015 14.0 (H) 4.3 - 6.0 % Final     Potassium   Date Value Ref Range Status   05/29/2015 3.5 3.4 - 5.3 mmol/L Final     ALT   Date Value Ref Range Status   01/24/2019 13 0 - 50 U/L Final     AST   Date Value Ref Range Status   01/24/2019 10 0 - 45 U/L Final     TSH   Date Value Ref Range Status   01/24/2019 0.96 0.40 - 4.00 mU/L Final     T4 Free   Date Value Ref Range Status   11/23/2016 1.05 0.76 - 1.46 ng/dL Final       ASSESSMENT/PLAN:    1. TYPE 1 DIABETES MELLITUS: Mallory will try using the activity mode feature on her insulin pump 1/2-hour before,  during and one half post exercise.  If she gives herself an insulin bolus she will work on trying to be sure to eat.  As above, she will be undergoing testing for ADHD.  On no change in insulin pump settings today.  Patient has follow-up with Veronica Joseph RD/CDE later this month.  She continues to eat healthy and remains active.  Ophthalmology referral placed today.  Pt's urine microalbuminuria negative in February 2024 with a normal creat/GFR.    Mallory denies sx of diabetic peripheral neuropathy or foot ulcers at this time.  Her LDL 88 in January 2024.  No vitals today.    2.  FOLLOW UP: With Dr Cantrell in April 2025.  Follow-up with me in November 2024.  Patient has appoint with Corin Joseph in July 2024.  Ophthalmology referral placed today.  Gvoke RX ordered today.  Lantus vial ordered today.      Time spent reviewing chart, labs, OmniPod5 insulin pump and DexcomG6 sensor data today = 5 minutes.  Time for video visit today= 15 minutes.  Time for documentation today = 10 minutes    TOTAL TIME FOR VISIT TODAY = 30 minutes.    Sara Larry PA-C

## 2024-07-11 NOTE — LETTER
7/11/2024       RE: Mallory Barajas  [address] [address]     Dear Colleague,    Thank you for referring your patient, Mallory Barajas, to the [facility] [place] at [facility]. Please see a copy of my visit note below.    Outcome for 06/24/24 12:58 PM: Data uploaded on Dexcom  Breanna Vázquez LPN   Outcome for 06/24/24 12:58 PM: [employer] message sent  Breanna Vázquez LPN   Outcome for 07/09/24 10:19 AM: Data obtained via Dexcom website  Breanna Vázquez LPN   Outcome for 07/09/24 11:32 AM: Left Voicemail   Breanna Vázquez LPN   Outcome for 07/09/24 2:10 PM: Data obtained via AppSheet website  Breanna Vázquez LPN                                               Time of start: 2:09 PM   Time of end: 2:24 PM   Total duration of video visit:15 minutes.  Providers location: offsite.  Patients location: [place].    \[facility]\""  Mallory Barajas is a 34 year old female with type 1 diabetes mellitus.  Video visit today for diabetes follow up.  Mallory recently started using an OmniPod 5 insulin pump. She is happy with this pump.  She has been seeing Veronica Joseph for diabetes education.  Mallory had a virtual visit with Dr. Cantrell in March 2024.  Pt was dx having type 1 diabetes mellitus in May 2015. No hx of retinopathy, nephropathy or neuropathy.  Mallory is currently using an OmniPod5 insulin pump and DexcomG6 sensor.  Her current basal insulin rate is set at 0.25 units/hr.   Her I/C ratio is 1:15 and CF 58.  Most recent A1C was 6.4 % on 2/20/2024.   I reviewed and scanned her OmniPod insulin pump and Dexcom sensor download data in her note below.  She continues to have some hypoglycemia with exercise.  She is very active in sports.  She will also at times give herself bolus insulin and then forget to eat and then have hypoglycemia.  She will be undergoing testing for ADHD.  She has not used the activity mode feature on her insulin pump.  Average glucose 141.  Blood sugar in target  range 70% of the time and below target 8% of the time.  On ROS today, Mallory eats healthy and is very active with sports. She continues to teach and .  She denies blurred vision, n/v, SOB at rest or cough. No chest pain, abd pain, diarrhea, dysuria or hematuria.  She denies numbness or tingling in her feet or hands.  Mallory denies any foot ulcers at this time.    DIABETES CARE:   Retinopathy: none.  She is due for ophthalmology exam-ophthalmology referral placed today.  Nephropathy: none; urine microalbuminuria negative in February 2024.  Neuropathy: none.  Foot exam: no exam today.  Lipids: LDL 80 in January 2024.  CAD: no.  Mental mera: no depression.  Will be undergoing testing for ADHD.  Insulin: OmniPod 5 insulin pump.  Testing: DexcomG6 sensor.  Hypoglycemia: Baqsimi renewed today.  Back up insulin: RX for Lantus vial sent to pharmacy today to use in case of insulin pump failure.            ROS  Please see under HPI.    Allergies  No Known Allergies    MEDICATIONS:  Reviewed with pt today.    Family History  Mother with hx of type 2 diabetes.  First cousin with hx of type 1 diabetes.    Social History  Smoke: none.  ETOH: rare.  Occupation: Math and .      Past Medical History  Past Medical History:   Diagnosis Date    NO ACTIVE PROBLEMS (aka NONE)      Past Surgical History:   Procedure Laterality Date    ARTHROSCOPY KNEE BILATERAL         Physical Exam    No exam today.    RESULTS  Creatinine   Date Value Ref Range Status   02/20/2024 0.82 0.51 - 0.95 mg/dL Final   02/03/2020 0.78 0.52 - 1.04 mg/dL Final     GFR Estimate   Date Value Ref Range Status   02/20/2024 >90 >60 mL/min/1.73m2 Final   02/03/2020 >90 >60 mL/min/[1.73_m2] Final     Comment:     Non  GFR Calc  Starting 12/18/2018, serum creatinine based estimated GFR (eGFR) will be   calculated using the Chronic Kidney Disease Epidemiology Collaboration   (CKD-EPI) equation.       Hemoglobin A1C   Date Value Ref Range Status    02/20/2024 6.4 (H) 0.0 - 5.6 % Final     Comment:     Normal <5.7%   Prediabetes 5.7-6.4%    Diabetes 6.5% or higher     Note: Adopted from ADA consensus guidelines.   05/28/2015 14.0 (H) 4.3 - 6.0 % Final     Potassium   Date Value Ref Range Status   05/29/2015 3.5 3.4 - 5.3 mmol/L Final     ALT   Date Value Ref Range Status   01/24/2019 13 0 - 50 U/L Final     AST   Date Value Ref Range Status   01/24/2019 10 0 - 45 U/L Final     TSH   Date Value Ref Range Status   01/24/2019 0.96 0.40 - 4.00 mU/L Final     T4 Free   Date Value Ref Range Status   11/23/2016 1.05 0.76 - 1.46 ng/dL Final       ASSESSMENT/PLAN:    1. TYPE 1 DIABETES MELLITUS: Mallory will try using the activity mode feature on her insulin pump 1/2-hour before, during and one half post exercise.  If she gives herself an insulin bolus she will work on trying to be sure to eat.  As above, she will be undergoing testing for ADHD.  On no change in insulin pump settings today.  Patient has follow-up with Veronica Joseph RD/CDE later this month.  She continues to eat healthy and remains active.  Ophthalmology referral placed today.  Pt's urine microalbuminuria negative in February 2024 with a normal creat/GFR.    Mallory denies sx of diabetic peripheral neuropathy or foot ulcers at this time.  Her LDL 88 in January 2024.  No vitals today.    2.  FOLLOW UP: With Dr Cantrell in April 2025.  Follow-up with me in November 2024.  Patient has appoint with Corin Joseph in July 2024.  Ophthalmology referral placed today.  Gvoke RX ordered today.  Lantus vial ordered today.      Time spent reviewing chart, labs, OmniPod5 insulin pump and DexcomG6 sensor data today = 5 minutes.  Time for video visit today= 15 minutes.  Time for documentation today = 10 minutes    TOTAL TIME FOR VISIT TODAY = 30 minutes.    Sara Larry PA-C    Virtual Visit Details    Type of service:  Video Visit     Originating Location (pt. Location):   Distant Location (provider location):    Platform  used for Video Visit:

## 2024-07-11 NOTE — NURSING NOTE
Current patient location: Patient declined to provide     Is the patient currently in the state of [place]? YES    Visit mode:VIDEO    If the visit is dropped, the patient can be reconnected by: VIDEO VISIT: Text to cell phone:   Telephone Information:   Mobile [phone]       Will anyone else be joining the visit? NO  (If patient encounters technical issues they should call [phone] :[phone])    How would you like to obtain your AVS? MyChart    Are changes needed to the allergy or medication list? No    Are refills needed on medications prescribed by this physician? NO    Reason for visit: RECHECK Shelby Kocher VVF

## 2024-07-24 ENCOUNTER — VIRTUAL VISIT (OUTPATIENT)
Dept: PSYCHIATRY | Facility: CLINIC | Age: 34
End: 2024-07-24
Attending: NURSE PRACTITIONER
Payer: COMMERCIAL

## 2024-07-24 DIAGNOSIS — F33.1 MAJOR DEPRESSIVE DISORDER, RECURRENT EPISODE, MODERATE (H): Primary | ICD-10-CM

## 2024-07-24 DIAGNOSIS — F33.0 MAJOR DEPRESSIVE DISORDER, RECURRENT EPISODE, MILD (H): ICD-10-CM

## 2024-07-24 PROCEDURE — 99214 OFFICE O/P EST MOD 30 MIN: CPT | Mod: 95 | Performed by: NURSE PRACTITIONER

## 2024-07-24 PROCEDURE — G2211 COMPLEX E/M VISIT ADD ON: HCPCS | Mod: 95 | Performed by: NURSE PRACTITIONER

## 2024-07-24 RX ORDER — BUPROPION HYDROCHLORIDE 300 MG/1
300 TABLET ORAL EVERY MORNING
Qty: 90 TABLET | Refills: 0 | Status: SHIPPED | OUTPATIENT
Start: 2024-07-24

## 2024-07-24 ASSESSMENT — PAIN SCALES - GENERAL: PAINLEVEL: NO PAIN (0)

## 2024-07-24 NOTE — NURSING NOTE
Current patient location: [address] [place] [address]    Is the patient currently in the state of [place]? YES    Visit mode:VIDEO    If the visit is dropped, the patient can be reconnected by: VIDEO VISIT: Text to cell phone:   Telephone Information:   Mobile [phone]       Will anyone else be joining the visit? NO  (If patient encounters technical issues they should call [phone]    How would you like to obtain your AVS? MyChart    Are changes needed to the allergy or medication list? No    Are refills needed on medications prescribed by this physician? NO    Reason for visit: THEODORE PHAM

## 2024-07-24 NOTE — PATIENT INSTRUCTIONS
-Continue on current medication regimen. Please note that Wellbutrin (Bupropion) is now ordered with 300 mg tablet, please read the label carefully.  If you need refill of Sertraline, please contact niels.    Your next appointment is scheduled on 10/23/2024 (Wed) at 3:30pm.      **For crisis resources, please see the information at the end of this document**   Patient Education    Thank you for coming to the [facility] [place] [facility].     Lab Testing:  If you had lab testing today and your results are reassuring or normal they will be mailed to you or sent through [employer] within 7 days. If the lab tests need quick action we will call you with the results. The phone number we will call with results is # [phone]. If this is not the best number please call our clinic and change the number.     Medication Refills:  If you need any refills please call your pharmacy and they will contact us. Our fax number for refills is [phone].   Three business days of notice are needed for general medication refill requests.   Five business days of notice are needed for controlled substance refill requests.   If you need to change to a different pharmacy, please contact the new pharmacy directly. The new pharmacy will help you get your medications transferred.     Contact Us:  Please call [phone] during business hours (8-5:00 M-F).   If you have medication related questions after clinic hours, or on the weekend, please call [phone].     Financial Assistance [phone]   Medical Records [phone]       MENTAL HEALTH CRISIS RESOURCES:  For a emergency help, please call 911 or go to the nearest Emergency Department.     Emergency Walk-In Options:   EmPATH Unit @ [facility] ([place]): [phone] - Specialized mental health emergency area designed to be calming  [facility] ([place]): [phone]  [facility] ([place]):  [phone]  [facility] ([place]): [phone]    [facility] Crisis Information:   [place]: [phone]  [place]: [phone]  Alexa ARIZA) - Adult: [phone]     Child: [phone]  [place] - Adult: [phone]     Child: [phone]  [place]: [phone]  List of all [facility] resources:   https://[place].[facility]/dhs/people-we-serve/adults/health-care/mental-health/resources/crisis-contacts.jsp    National Crisis Information:   Crisis Text Line: Text  [place]  to 500347  Suicide & Crisis Lifeline: 988  National Suicide Prevention Lifeline: 9-562-531-TALK (1-965.515.1450)       For online chat options, visit https://suicidepreventionlifeline.org/chat/  Poison Control Center: 1-712.629.2424  Trans Lifeline: 2-264-039-2802 - Hotline for transgender people of all ages  The Jacky Project: 1-832-037-1568 - Hotline for LGBT youth     For Non-Emergency Support:   Fast Tracker: Mental Health & Substance Use Disorder Resources -   https://www.ALEXANDALEXAn.org/

## 2024-07-24 NOTE — PROGRESS NOTES
Virtual Visit Details    Type of service:  Video Visit     Originating Location (pt. Location): Home  Distant Location (provider location):  Off-site  Platform used for Video Visit: [facility]      Psychiatry Clinic Progress Note                                                                  Patient Name: Mallory Barajas  YOB: 1990  MRN: [id number]  Date of Service:  07/24/2024  Last Seen: 6/26/2024    Mallory Barajas is a 34 year old nonbinary adult, assigned female at birth who uses the name Mallory and pronoun she, they. Pt wants they pronoun used today for charting purpose.     Mallory Barajas is a 34 year old adult who presents for ongoing psychiatric care.  Mallory was last seen in clinic on 6/26/2024.     At that time,     Medication Ordered/Consults/Labs/tests Ordered:     Medication:   -Increase Wellbutrin XL to 300 mg daily .Monitor for irritability, appetite suppression (thus blood sugar change) and anxiety.   -Continue on current Zoloft (Sertraline) regimen for now.  OTC Recommendations: none  Lab Orders:  none  Referrals: none  Release of Information: none  Future Treatment Considerations: Per symptoms. Gabapentin?   Return for Follow Up: in 4 weeks    Pertinent Background:  Depression started around 18 in college. Passive SI also started during college, no hx of SA. Cutting started in college, last cutting in 2015. No psych hospitalization. Anxiety started around high school. Medical complication includes DM I.     Previous medication trials: Prozac (ineffective, early 20's)    Therapist: Shannan Chaudhary (weekly)     Interim History                                                                                                        4, 4     Per chart review,    On 7/11/2024, pt saw Endo and note indicated Average glucose 141. Blood sugar in target range 70% of the time and below target 8% of the time. Continues to have some hypoglycemia with exercise. They are very active in sports. They will also at times  give themselves bolus insulin and then forget to eat and then have hypoglycemia. No medication changes in the visit.    Since the last visit,  -Reports doing well. Mood and anxiety are optimally managed except in increased stress situation. Denies SI, SIB or HI.  -Resolved fatigue and brain fog.  -No ADR with increased Wellbutrin. No suppressed appetite and BG has been fairly well managed.  -Still sleeping well. Goes to bed 9:30pm, falls asleep within 30 min, wakes up 4:45am.  -Was in [place] for a weekend. Had few drinks, but otherwise has not had any drink. Does not feel the urge to drink.  -Wants to continue on current medication regimen as feeling well.    Denies any symptoms suggestive of hypomania or psychosis.    Current Suicidality/Hx of Suicide Attempts: Denies both  CoCominent Medical concerns: DM I.    Medication Side Effects: The patient denies all medication side effects.      Medical Review of Systems     Apart from the symptoms mentioned int he HPI, the 14 point review of systems, including constitutional, HEENT, cardiovascular, respiratory, gastrointestinal, genitourinary, musculoskeletal, integumentary, endocrine, neurological, hematologic and allergic is entirely negative.    Pregnant: None. Nursing: None, Contraception: partner not sperm producing.     Substance Use     -See HPI.  -Reports having 2 weeks stretch of >5 drinks x3/week, but not drinking for 3-4 months at a time.  Goes to bed 9:30pm, falls asleep within 30 min, wakes up 4:45am. Sleep throughout the night mostly. May wake up x1 for bathroom, but able to go back to sleep easily. Feels rested in AM. Hx of DWI in 2018 in [place]. One black out at home.  -Cannabis: gummies 5 mg every 4 months.  -Denies any other substance use.    Social/ Family History                                  [per patient report]                                 1ea,1ea     -Living arrangements: lives with spouse and feels safe.   -Social Support: spouse, parents,  siblings, close friends. Parents, sister, some friends in [place], otherwise they are all in [place].   -Access to gun: Denies  -Denies trauma history.   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 This year, teaching summer school.      Allergy                                Patient has no known allergies.    Current Medications                                                                                                       Current Outpatient Medications   Medication Sig Dispense Refill    acetone urine (KETOSTIX) test strip Use 1 strip to check urine ketones in the event of unexplained high blood glucose, or in the case of illness.90 day supply 50 strips 50 strip 11    blood glucose (ACCU-CHEK MACARIO PLUS) test strip USE TO TEST BLOOD SUGARS SIX TIMES DAILY OR AS DIRECTED 600 each 3    blood glucose monitoring (ACCU-CHEK MACARIO PLUS) meter device kit Use to test blood sugar 6 times daily or as directed. 1 kit 0    blood glucose monitoring (ULTRA THIN 30G) lancets Use to test blood sugar 6 times daily or as directed.whatever is covered 540 each 3    buPROPion (WELLBUTRIN XL) 150 MG 24 hr tablet Take 2 tablets (300 mg) by mouth every morning 60 tablet 1    Continuous Blood Gluc  (DEXCOM G6 ) GHADA Use to read blood sugars as per 's instructions. 1 each 0    Continuous Blood Gluc Sensor (DEXCOM G6 SENSOR) MISC CHANGE SENSOR EVERY 10 DAYS 9 each 3    Continuous Glucose Transmitter (DEXCOM G6 TRANSMITTER) MISC CHANGE TRANSMITTER EVERY 3 MONTHS 1 each 1    Glucagon (BAQSIMI TWO PACK) 3 MG/DOSE nasal powder Spray 1 spray (3 mg) in nostril as needed (severe hypoglycemia) 2 each 3    Insulin Disposable Pump (OMNIPOD 5 G6 INTRO, GEN 5,) KIT 1 kit as needed 1 kit 0    Insulin Disposable Pump (OMNIPOD 5 G6 PODS, GEN 5,) MISC 1 pod every 3 days 30 each 4    insulin glargine (LANTUS VIAL) 100 UNIT/ML vial Inject 6 units subcutaneous daily ONLY IF INSULIN PUMP  "FAILS. 10 mL 1    insulin lispro (HUMALOG) 100 UNIT/ML Cartridge Use in pump aprox 50 units daily 50 mL 3    insulin pen needle 30G X 5 MM Use 4 pen needles daily or as directed. 120 each 12    insulin syringes, disposable, U-100 0.3 ML MISC Use for insulin ONLY IF INSULIN PUMP FAILS. 50 each 1    sertraline (ZOLOFT) 100 MG tablet Take 1.5 tablets by mouth daily      Wound Dressing Adhesive (MASTISOL ADHESIVE) LIQD Use for securing sensor 15 mL 1         Vitals                                                                                                                       3, 3   There were no vitals taken for this visit.        Mental Status Exam                                                                                   9, 14 cog      Alertness: alert  and oriented  Appearance:  Casually dressed and Adequately groomed  Behavior/Demeanor: cooperative, pleasant, and calm, with good  eye contact   Speech: regular rate and rhythm  Mood :  \"good\"  Affect:  Euthymic ; was congruent to mood; was congruent to content  Thought Process (Associations):  Logical, Linear, and Goal directed  Thought process (Rate):  Normal  Thought content:  no overt psychosis, denies suicidal ideation, intent or thoughts, and patient does not appear to be responding to internal stimuli  Perception:  Reports none;  Denies auditory hallucinations and visual hallucinations  Attention/Concentration:  Normal  Memory:  Immediate recall intact and Short-term memory intact  Language: intact  Fund of Knowledge/Intelligence:  Average  Abstraction:  Normal  Insight:  Good  Judgment:  Good  Cognition: (6) does  appear grossly intact; formal cognitive testing was not done    Physical Exam     Motor activity/EPS:  Normal  Psychomotor: normal or unremarkable    Labs and Results      Pertinent findings on review include: Review of records  with relevant information reported in the HPI.  Reviewed pt's past medical record and obtained collateral " information.      [place] PRESCRIPTION MONITORING PROGRAM [] was checked today:  not using controlled substances.          5/20/2024    10:43 AM 6/26/2024     7:57 AM   PHQ   PHQ-9 Total Score 12 10   Q9: Thoughts of better off dead/self-harm past 2 weeks Several days Several days   F/U: Thoughts of suicide or self-harm No No   F/U: Safety concerns No No       RIANNA 7 Today: N/A      5/15/2024     5:05 PM   RIANNA-7 SCORE   Total Score 15 (severe anxiety)   Total Score 15       Recent Labs   Lab Test 02/20/24  0752 09/27/22  1632 02/03/20  1745   CR 0.82 0.86 0.78   GFRESTIMATED >90 >90 >90     Recent Labs   Lab Test 01/24/19  0821 11/23/16  0931   AST 10  --    ALT 13 12     PSYCHOTROPIC DRUG INTERACTIONS:    Wellbutrin---Zoloft: Concurrent use of BUPROPION and SEIZURE THRESHOLD LOWERING AGENTS may result in increased risk of seizures.   MANAGEMENT:  routine monitoring and pt is aware of the risks.    Impression/Assessment      Mallory Barajas is a 34 year old adult  who presents for med management follow up.  Pt appears stable in their mood and anxiety, denies SI, SIB or HI during the appointment. Pt tolerating Wellbutrin increase without any ADR, mood optimally managed and fatigue and brain fog resolved. Pt noted significant decrease in alcohol use and not having any alcohol craving. Reiterated risk of alcohol with Wellbutrin. But since doing well, pt wants to continue on current medication regimen. Ok to continue on current medication regimen. Pt declined Zoloft refill today as they continue to get refill from previous provider.    Diagnosis                                                                    MDD  RIANNA  R/out binge drinking    Treatment Recommendation & Plan       Medication Ordered/Consults/Labs/tests Ordered:     Medication: Continue on current medication regimen. Please note that Wellbutrin (Bupropion) is now ordered with 300 mg tablet, please read the label carefully.  If you need refill of Sertraline,  please contact niels.  OTC Recommendations: none  Lab Orders:  none  Referrals: none  Release of Information: none  Future Treatment Considerations: Per symptoms. Gabapentin if alcohol craving in the future.  Return for Follow Up: in 3 months    -Discussed safety plan for suicidal thoughts  -Discussed plan for suicidality  -Discussed available emergency services  -Patient agrees with the treatment plan  -Encouraged to continue outpatient therapy to gain more coping mechanism for stress.    Treatment Risk Statement: Discussed with the patient my impressions, as well as recommended studies. I educated patient on the differential diagnosis and prognosis. I discussed with the patient the risks and benefits of medications versus no interventions, including efficacy, dose, possible side effects and length of treatment and the importance of medication compliance.  The patient understands the risks, benefits, adverse effects and alternatives. Agrees to treatment with the capacity to do so. No medical contraindications to treatment. The patient also understands the risks of using street drugs or alcohol.     CRISIS NUMBERS:   Provided routinely in AVS.      Diagnosis or treatment significantly limited by social determinants of health.    The longitudinal plan of care for the diagnosis(es)/condition(s) as documented were addressed during this visit. Due to the added complexity in care, I will continue to support Mallory in the subsequent management and with ongoing continuity of care.        Niels Adam, RANJEET,  07/24/2024

## 2024-07-30 ENCOUNTER — ALLIED HEALTH/NURSE VISIT (OUTPATIENT)
Dept: EDUCATION SERVICES | Facility: CLINIC | Age: 34
End: 2024-07-30
Payer: COMMERCIAL

## 2024-07-30 DIAGNOSIS — Z91.199 NO-SHOW FOR APPOINTMENT: Primary | ICD-10-CM

## 2024-07-30 DIAGNOSIS — E10.9 TYPE 1 DIABETES MELLITUS WITHOUT COMPLICATION (H): ICD-10-CM

## 2024-07-30 PROCEDURE — G0108 DIAB MANAGE TRN  PER INDIV: HCPCS | Performed by: DIETITIAN, REGISTERED

## 2024-07-30 NOTE — PATIENT INSTRUCTIONS
Rayray Tejada,    It was a pleasure meeting with you today!    Here is a summary of our visit:       Use Activity Mode when walking the dog to prevent low sugars.  Start 1 hour before and continue 1 hour after completing the walk.  Always carry a vial of insulin, syringe and extra pods in the event of pod failure.  Continue to count carbohydrates accurately and bolus 15 minutes before meals.  Watch you email for notification on the Omnipod 5 ksuh for [employer].    Follow-up: every 3 months with endocrinology and as needed with diabetes education.        Thank you,    Veronica Joseph RDN, ROZINA, [facility]  Dietitian and Diabetes Education - Endocrinology  [facility]  [address] [place] [address]  Phone: [phone]  Email: [email or web address]    Contact information:   To schedule a diabetes education appointment:[phone].  For questions or concerns, please send a [employer] message or call the clinic at [phone].    For more urgent concerns that do not require 707, please call [phone] after hours/weekends and ask to speak with the Endocrinologist on call.       Please let us know if you are having low blood sugars less than 70 or over 300 mg/dL.  Do not wait until your next appointment if this is happening.

## 2024-07-30 NOTE — PROGRESS NOTES
DIABETES SELF-MANAGEMENT EDUCATION & SUPPORT    Presents for: Insulin Pump and CGM Review    Referred by:  Sara Larry PA-C      REPORTS:      Insulin Pump Information           Education specific to insulin pump provided today on:   steps to take when glucose is above 240 mg/dL, multiple daily injection back-up plan in case of pump failure, and how to adjust for exercise    Opportunities for ongoing education and support in diabetes-self management were discussed.    Pt verbalized understanding of concepts discussed and recommendations provided today.       ASSESSMENT    Mallory recently changed from the Tandem insulin pump to the Omnipod.  She is very active and enjoying the freedom of no tubing.  She is experimenting with different sites for the Omnipod and finds the back of her arm is best.    Mallory is experiencing less hypoglycemia, which has been a goal of hers.  Her time <70 mg/dl has decreased from 8% to 5%.    Mallory's TIR of  mg/dl  is 71% (goal >70%) with a CV of 39% (goal <36%).    Mallory's sugars show a pattern of dropping low after walking the dog in the evening.  Recommend she activity mode 1 hour before, during and 1 hour after walking the dog.    Some low sugars may be due to over estimating carbs.  Mallory plays sports and low sugars can be provoked from increased activity as well.  She responds to lows quickly.    PLAN     Use Activity Mode when walking the dog to prevent low sugars.  Start 1 hour before and continue 1 hour after completing the walk.  Always carry a vial of insulin, syringe and extra pods in the event of pod failure.  Continue to count carbohydrates accurately and bolus 15 minutes before meals.  Watch you email for notification on the Omnipod 5 kush for [employer].    Follow-up: every 3 months with endocrinology and as needed with diabetes education.        SUBJECTIVE/OBJECTIVE:  Presents for: Insulin Pump and CGM Review  Accompanied by: Self  Diabetes type: Type 1  Disease course:  Improving  Diabetes management related comments/concerns: Changed pod site from leg to back of arm because she's bumped it a couple times on her leg and had to change her pod early.  Cultural Influences/Ethnic Background:  Not  or     .  .    Diabetes Symptoms & Complications:  Symptom course: Stable  Disease course: Improving       Patient Problem List and Family Medical History reviewed for relevant medical history, current medical status, and diabetes risk factors.    Labs:  Lab Results   Component Value Date    A1C 6.4 02/20/2024    A1C 14.0 05/28/2015    HEMOGLOBINA1 6.4 03/28/2023     Lab Results   Component Value Date     05/29/2015       Healthy Eating:  Meal planning/habits: Carb counting (Counting carbs more accurately - using the calculator on [employer].)  Who purchases food in  your home?: Spouse  Breakfast: Bagel with cream cheese, eggs, sometimes coffee drink  Lunch: chicken drumstick and macaroni and cheese  Dinner: [employer] meal  Snacks: animal crackers, pie and ice cream.    Being Active:  Being Active Assessed Today: Yes  Exercise:: Yes (softball, kickball on weekends.  Weight lifting some mornings.  Walk the dogs around 6 pm in the evening.)  Days per week of moderate to strenuous exercise (like a brisk walk): 7  On average, minutes per day of exercise at this level: 60  How intense was your typical exercise? : Moderate (like brisk walking)  Exercise Minutes per Week: 420    Monitoring:     Dexcom G6.  Will stay on the G6 and wait for the Iphone Omnipod 5 Kyle.    Taking Medications:  Diabetes Medication(s)       Diabetic Other       Glucagon (BAQSIMI TWO PACK) 3 MG/DOSE nasal powder Spray 1 spray (3 mg) in nostril as needed (severe hypoglycemia)       Insulin       insulin glargine (LANTUS VIAL) 100 UNIT/ML vial Inject 6 units subcutaneous daily ONLY IF INSULIN PUMP FAILS.     insulin lispro (HUMALOG) 100 UNIT/ML Cartridge Use in pump aprox 50  units daily                 Problem Solving:  Is the patient at risk for hypoglycemia?: Yes  Hypoglycemia Frequency: Daily (Pattern of low sugars after walking the dog.)  Hypoglycemia Treatment: Juice    Hypoglycemia Symptoms  Hypoglycemia:  (unawareness - has the dexcom on her watch.)    Veronica Joseph RDN, WAYNE, [facility]  Dietitian and Diabetes  - Endocrinology  [facility]      Time Spent: 30 minutes  Encounter Type: Individual    Any diabetes medication dose changes were made via the CDE Protocol per the patient's endocrinology provider. A copy of this encounter was shared with the provider.

## 2024-08-28 ENCOUNTER — TELEPHONE (OUTPATIENT)
Dept: OPHTHALMOLOGY | Facility: CLINIC | Age: 34
End: 2024-08-28
Payer: COMMERCIAL

## 2024-09-10 DIAGNOSIS — E10.9 TYPE 1 DIABETES MELLITUS WITHOUT COMPLICATION (H): ICD-10-CM

## 2024-09-10 RX ORDER — PROCHLORPERAZINE 25 MG/1
SUPPOSITORY RECTAL
Qty: 1 EACH | Refills: 1 | Status: SHIPPED | OUTPATIENT
Start: 2024-09-10

## 2024-09-15 ENCOUNTER — HEALTH MAINTENANCE LETTER (OUTPATIENT)
Age: 34
End: 2024-09-15

## 2024-10-09 ENCOUNTER — TELEPHONE (OUTPATIENT)
Dept: OPHTHALMOLOGY | Facility: CLINIC | Age: 34
End: 2024-10-09
Payer: COMMERCIAL

## 2024-10-12 ENCOUNTER — OFFICE VISIT (OUTPATIENT)
Dept: OPHTHALMOLOGY | Facility: CLINIC | Age: 34
End: 2024-10-12
Attending: PHYSICIAN ASSISTANT
Payer: COMMERCIAL

## 2024-10-12 DIAGNOSIS — E10.9 TYPE 1 DIABETES MELLITUS WITHOUT COMPLICATION (H): ICD-10-CM

## 2024-10-12 PROCEDURE — 92014 COMPRE OPH EXAM EST PT 1/>: CPT | Performed by: STUDENT IN AN ORGANIZED HEALTH CARE EDUCATION/TRAINING PROGRAM

## 2024-10-12 ASSESSMENT — SLIT LAMP EXAM - LIDS
COMMENTS: MEIBOMIAN GLAND DYSFUNCTION
COMMENTS: MEIBOMIAN GLAND DYSFUNCTION

## 2024-10-12 ASSESSMENT — CONF VISUAL FIELD
OS_NORMAL: 1
OS_SUPERIOR_NASAL_RESTRICTION: 0
OD_INFERIOR_NASAL_RESTRICTION: 0
OS_INFERIOR_TEMPORAL_RESTRICTION: 0
OS_INFERIOR_NASAL_RESTRICTION: 0
OD_NORMAL: 1
OD_INFERIOR_TEMPORAL_RESTRICTION: 0
OD_SUPERIOR_TEMPORAL_RESTRICTION: 0
METHOD: COUNTING FINGERS
OS_SUPERIOR_TEMPORAL_RESTRICTION: 0
OD_SUPERIOR_NASAL_RESTRICTION: 0

## 2024-10-12 ASSESSMENT — EXTERNAL EXAM - LEFT EYE: OS_EXAM: NORMAL

## 2024-10-12 ASSESSMENT — VISUAL ACUITY
OD_SC: 20/20
OS_SC+: -2
OS_SC: 20/20
METHOD: SNELLEN - LINEAR

## 2024-10-12 ASSESSMENT — TONOMETRY
OS_IOP_MMHG: 18
IOP_METHOD: ICARE
OD_IOP_MMHG: 18

## 2024-10-12 ASSESSMENT — CUP TO DISC RATIO
OD_RATIO: 0.2
OS_RATIO: 0.2

## 2024-10-12 ASSESSMENT — EXTERNAL EXAM - RIGHT EYE: OD_EXAM: NORMAL

## 2024-10-12 NOTE — PROGRESS NOTES
HPI: Mallory Barajas is a 34 year old female that comes for a complete diabetic evaluation.  She reports that her vision has been stable.     Lab Results   Component Value Date    A1C 6.4 02/20/2024    A1C 14.0 05/28/2015     Current ocular medications: None  Prior ocular medications: None    Ocular history:   None    Medical history:  Type I diabetes diagnosed 2015 on insulin pump    Review of systems: Does not report fatigue, weight loss, fevers, chills, night sweats, frequent headaches, seizure, fainting, numbness/tingling, weakness, oral ulcers, genital ulcers, recurrent nosebleeds, sinusitis, hearing loss, tinnitus, chronic cough, chest pain, shortness of breath, skin rash, tick bite, easy bruising, easy bleeding, joint pain/stiffness, back pain, recurrent diarrhea, bloody stools, bloody urine.    Family history:   Cousin with type 1 diabetes  No glaucoma, no macular degeneration    Social history:   Non smoker  Alcohol occasionally 2 times a week 1-2 drinks  No drugs    Impression/Plan:  Type I diabetes without diabetic retinopathy each eye   No signs of diabetic retinopathy   A1c: 6.4   Discussed with patient about the importance of tight glycemic control     Dry eye each eye   Artificial tears  Warm Compresses      Return in about 2 years (around 10/12/2026) for DFE.     ATTESTATION     Attending Physician Attestation:      Complete documentation of historical and exam elements from today's encounter can be found in the full encounter summary report (not reduplicated in this progress note).  I personally obtained the chief complaint(s) and history of present illness.  I confirmed and edited as necessary the review of systems, past medical/surgical history, family history, social history, and examination findings as documented by others; and I examined the patient myself.  I personally reviewed the relevant tests, images, and reports as documented above.  I formulated and edited as necessary the assessment and  plan and discussed the findings and management plan with the patient and family    Ponce Krause MD  PGY-6 Vitreo-retina surgery Fellow  Department of Ophthalmology   [facility]

## 2024-10-21 DIAGNOSIS — F33.0 MAJOR DEPRESSIVE DISORDER, RECURRENT EPISODE, MILD (H): ICD-10-CM

## 2024-10-21 RX ORDER — BUPROPION HYDROCHLORIDE 300 MG/1
300 TABLET ORAL EVERY MORNING
Qty: 30 TABLET | Refills: 0 | Status: SHIPPED | OUTPATIENT
Start: 2024-10-21 | End: 2024-10-23

## 2024-10-21 NOTE — TELEPHONE ENCOUNTER
"Date of Last Office Visit: 7/24/2024  [facility]      RTC: 3mo  No shows: 0  Cancellations: 0  Date of Next Office Visit:    10/23/2024 3:30 PM (30 min)  Thanh   Arrive by:  3:15 PM   ADULT PSYCHIATRY RETURN    Rfl Status: Authorized   URPSY ([facility])   Coral Adam APRN CNP     ------------------------------    Medication requested:    buPROPion (WELLBUTRIN XL) 300 MG 24 hr tablet 90 tablet 0 7/24/2024 -- No   Sig - Route: Take 1 tablet (300 mg) by mouth every morning - Oral     ------------------------------       Refill Decision:    [x]Medication refilled per  Medication Refill in Ambulatory Care  policy.         [] Supervision: no future appointment scheduled. Scheduling has been notified to contact the pt for appointment.    []Medication unable to be refilled by RN due to criteria not met as indicated below:          [] Eligibility - Pt not seen within past 12 months, no future appointment       [] Supervision: no future appointment scheduled. Scheduling has been notified to contact the pt for appointment.       [] Compliance - lapse in therapy/gap in refills; No Shows; Cancellations       [] Verification - order discrepancy, clarification needed, modification needed       [] Shellie refills given. Pt did not follow-up, pended to care team for decision       [] Controlled medication       [] Medication not included in refill protocol policy       [] Medication is Reported/Historical       [] Medication not active on Pt's med list       [] > 30-day supply request       [] Advanced refill request: > 7 days before refill date       [] Review is needed: new med; med adjusted ? 30 days; Safety Alert; requires lab monitoring       [] Last visit treatment plan \"Open/Pended/Unsigned\" - routing for review.       [] OTHER:                        "

## 2024-10-23 ENCOUNTER — VIRTUAL VISIT (OUTPATIENT)
Dept: PSYCHIATRY | Facility: CLINIC | Age: 34
End: 2024-10-23
Attending: NURSE PRACTITIONER
Payer: COMMERCIAL

## 2024-10-23 VITALS — WEIGHT: 180 LBS | HEIGHT: 70 IN | BODY MASS INDEX: 25.77 KG/M2

## 2024-10-23 DIAGNOSIS — F33.0 MAJOR DEPRESSIVE DISORDER, RECURRENT EPISODE, MILD (H): ICD-10-CM

## 2024-10-23 PROCEDURE — G2211 COMPLEX E/M VISIT ADD ON: HCPCS | Mod: 95 | Performed by: NURSE PRACTITIONER

## 2024-10-23 PROCEDURE — 99214 OFFICE O/P EST MOD 30 MIN: CPT | Mod: 95 | Performed by: NURSE PRACTITIONER

## 2024-10-23 RX ORDER — BUPROPION HYDROCHLORIDE 300 MG/1
300 TABLET ORAL EVERY MORNING
Qty: 90 TABLET | Refills: 1 | Status: SHIPPED | OUTPATIENT
Start: 2024-10-23

## 2024-10-23 ASSESSMENT — PAIN SCALES - GENERAL: PAINLEVEL_OUTOF10: NO PAIN (0)

## 2024-10-23 NOTE — PATIENT INSTRUCTIONS
-Continue on current medication regimen.    Your next appointment is scheduled on 2/19/2025 (Wed) at 3:30pm.      **For crisis resources, please see the information at the end of this document**   Patient Education    Thank you for coming to the [facility] [place] [facility].     Lab Testing:  If you had lab testing today and your results are reassuring or normal they will be mailed to you or sent through [employer] within 7 days. If the lab tests need quick action we will call you with the results. The phone number we will call with results is # [phone]. If this is not the best number please call our clinic and change the number.     Medication Refills:  If you need any refills please call your pharmacy and they will contact us. Our fax number for refills is [phone].   Three business days of notice are needed for general medication refill requests.   Five business days of notice are needed for controlled substance refill requests.   If you need to change to a different pharmacy, please contact the new pharmacy directly. The new pharmacy will help you get your medications transferred.     Contact Us:  Please call [phone] during business hours (8-5:00 M-F).   If you have medication related questions after clinic hours, or on the weekend, please call [phone].     Financial Assistance [phone]   Medical Records [phone]       MENTAL HEALTH CRISIS RESOURCES:  For a emergency help, please call 911 or go to the nearest Emergency Department.     Emergency Walk-In Options:   EmPATH Unit @ [facility] ([place]): [phone] - Specialized mental health emergency area designed to be calming  [facility] ([place]): [phone]  [facility] ([place]): [phone]  [facility]): [phone]    [facility] Crisis Information:   [place]: [phone]  Y[place]: [phone]  Alexa (JUSTINA) - Adult:  [phone]     Child: [phone]  Efren - Adult: [phone]     Child: [phone]  [place]: [phone]  List of all [facility] resources:   https://[place].gov/dhs/people-we-serve/adults/health-care/mental-health/resources/crisis-contacts.jsp    National Crisis Information:   Crisis Text Line: Text  [place]  to 004861  Suicide & Crisis Lifeline: 988  National Suicide Prevention Lifeline: 8-194-694-TALK (1-633.343.1759)       For online chat options, visit https://suicidepreventionlifeline.org/chat/  Poison Control Center: 1-724.628.4889  Trans Lifeline: 1-943.291.6287 - Hotline for transgender people of all ages  The Jacky Project: 8-036-300-3474 - Hotline for LGBT youth     For Non-Emergency Support:   Fast Tracker: Mental Health & Substance Use Disorder Resources -   https://www.Flow[employer] Aerock6connectn.org/

## 2024-10-23 NOTE — NURSING NOTE
Current patient location:  work    Is the patient currently in the state [facility]? YES    Visit mode:VIDEO    If the visit is dropped, the patient can be reconnected by: VIDEO VISIT: Text to cell phone:   Telephone Information:   Mobile [phone]       Will anyone else be joining the visit? NO  (If patient encounters technical issues they should call [phone])    Are changes needed to the allergy or medication list? No    Are refills needed on medications prescribed by this physician? NO    Rooming Documentation:  Questionnaire(s) completed    Reason for visit: RECHECK    Cortez PHAM

## 2024-10-23 NOTE — PROGRESS NOTES
Virtual Visit Details    Type of service:  Video Visit     Originating Location (pt. Location): Other work  Distant Location (provider location):  Off-site  Platform used for Video Visit: [facility]      Psychiatry Clinic Progress Note                                                                  Patient Name: Mallory Barajas  YOB: 1990  MRN: [id number]  Date of Service:  10/23/2024  Last Seen: 7/24/2024    Mallory Barajas is a 34 year old nonbinary adult, assigned female at birth who uses the name Mallory and pronoun she, they. Pt wants they pronoun used today for charting purpose.     Mallory Barajas is a 34 year old adult who presents for ongoing psychiatric care.  Mallory was last seen in clinic on 7/24/2024.     At that time,     Medication Ordered/Consults/Labs/tests Ordered:     Medication: Continue on current medication regimen. Please note that Wellbutrin (Bupropion) is now ordered with 300 mg tablet, please read the label carefully.  If you need refill of Sertraline, please contact niels.  OTC Recommendations: none  Lab Orders:  none  Referrals: none  Release of Information: none  Future Treatment Considerations: Per symptoms. Gabapentin if alcohol craving in the future.  Return for Follow Up: in 3 months    Pertinent Background:  Depression started around 18 in college. Passive SI also started during college, no hx of SA. Cutting started in college, last cutting in 2015. No psych hospitalization. Anxiety started around high school. Medical complication includes DM I.     Previous medication trials: Prozac (ineffective, early 20's)    Therapist: Shannan Chaudhary (weekly)     Interim History                                                                                                        4, 4     Since the last visit,  -Reports doing well. Mood and anxiety are optimally managed except in increased stress situation. Denies SI, SIB or HI.  -Still sleeping well. Goes to bed 9:30pm, falls asleep within 30 min,  wakes up 4:45am.  -Had a cousin's wedding where they had more drinks. Now limiting 2 drinks on weekend, but had one weekend which they had more drink. But other than that, feels drinking is well managed.  -Occasional BG low, but this is when they are distracted and not eating.  -Wants to continue on current medication regimen as feeling well. Declines Zoloft refill.    Denies any symptoms suggestive of hypomania or psychosis.    Current Suicidality/Hx of Suicide Attempts: Denies both  CoCominent Medical concerns: DM I.    Medication Side Effects: The patient denies all medication side effects.      Medical Review of Systems     Apart from the symptoms mentioned int he HPI, the 14 point review of systems, including constitutional, HEENT, cardiovascular, respiratory, gastrointestinal, genitourinary, musculoskeletal, integumentary, endocrine, neurological, hematologic and allergic is entirely negative.    Pregnant: None. Nursing: None, Contraception: partner not sperm producing.     Substance Use     -See HPI.  -Reports having 2 weeks stretch of >5 drinks x3/week, but not drinking for 3-4 months at a time.  Goes to bed 9:30pm, falls asleep within 30 min, wakes up 4:45am. Sleep throughout the night mostly. May wake up x1 for bathroom, but able to go back to sleep easily. Feels rested in AM. Hx of DWI in 2018 in [place]. One black out at home.  -Cannabis: gummies 5 mg every 4 months.  -Denies any other substance use.    Social/ Family History                                  [per patient report]                                 1ea,1ea     -Living arrangements: lives with spouse and feels safe.   -Social Support: spouse, parents, siblings, close friends. Parents, sister, some friends in [place], otherwise they are all in [place].   -Access to gun: Denies  -Denies trauma history.   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 This year, teaching summer school.      Allergy                                 Patient has no known allergies.    Current Medications                                                                                                       Current Outpatient Medications   Medication Sig Dispense Refill    acetone urine (KETOSTIX) test strip Use 1 strip to check urine ketones in the event of unexplained high blood glucose, or in the case of illness.90 day supply 50 strips 50 strip 11    blood glucose (ACCU-CHEK MACARIO PLUS) test strip USE TO TEST BLOOD SUGARS SIX TIMES DAILY OR AS DIRECTED 600 each 3    blood glucose monitoring (ACCU-CHEK MACARIO PLUS) meter device kit Use to test blood sugar 6 times daily or as directed. 1 kit 0    blood glucose monitoring (ULTRA THIN 30G) lancets Use to test blood sugar 6 times daily or as directed.whatever is covered 540 each 3    buPROPion (WELLBUTRIN XL) 300 MG 24 hr tablet Take 1 tablet (300 mg) by mouth every morning. 30 tablet 0    Continuous Blood Gluc  (DEXCOM G6 ) GHADA Use to read blood sugars as per 's instructions. 1 each 0    Continuous Blood Gluc Sensor (DEXCOM G6 SENSOR) MISC CHANGE SENSOR EVERY 10 DAYS 9 each 3    Continuous Glucose Transmitter (DEXCOM G6 TRANSMITTER) MISC CHANGE TRANSMITTER EVERY 3 MONTHS 1 each 1    Glucagon (BAQSIMI TWO PACK) 3 MG/DOSE nasal powder Spray 1 spray (3 mg) in nostril as needed (severe hypoglycemia) 2 each 3    Insulin Disposable Pump (OMNIPOD 5 G6 INTRO, GEN 5,) KIT 1 kit as needed 1 kit 0    Insulin Disposable Pump (OMNIPOD 5 G6 PODS, GEN 5,) MISC 1 pod every 3 days 30 each 4    insulin glargine (LANTUS VIAL) 100 UNIT/ML vial Inject 6 units subcutaneous daily ONLY IF INSULIN PUMP FAILS. 10 mL 1    insulin lispro (HUMALOG) 100 UNIT/ML Cartridge Use in pump aprox 50 units daily 50 mL 3    insulin pen needle 30G X 5 MM Use 4 pen needles daily or as directed. 120 each 12    insulin syringes, disposable, U-100 0.3 ML MISC Use for insulin ONLY IF INSULIN PUMP FAILS. 50 each 1     "sertraline (ZOLOFT) 100 MG tablet Take 1.5 tablets by mouth daily      Wound Dressing Adhesive (MASTISOL ADHESIVE) LIQD Use for securing sensor 15 mL 1         Vitals                                                                                                                       3, 3   There were no vitals taken for this visit.        Mental Status Exam                                                                                   9, 14 cog      Alertness: alert  and oriented  Appearance:  Casually dressed and Adequately groomed  Behavior/Demeanor: cooperative, pleasant, and calm, with good  eye contact   Speech: regular rate and rhythm  Mood :  \"good\"  Affect:  Euthymic ; was congruent to mood; was congruent to content  Thought Process (Associations):  Logical, Linear, and Goal directed  Thought process (Rate):  Normal  Thought content:  no overt psychosis, denies suicidal ideation, intent or thoughts, and patient does not appear to be responding to internal stimuli  Perception:  Reports none;  Denies auditory hallucinations and visual hallucinations  Attention/Concentration:  Normal  Memory:  Immediate recall intact and Short-term memory intact  Language: intact  Fund of Knowledge/Intelligence:  Average  Abstraction:  Normal  Insight:  Good  Judgment:  Good  Cognition: (6) does  appear grossly intact; formal cognitive testing was not done    Physical Exam     Motor activity/EPS:  Normal  Psychomotor: normal or unremarkable    Labs and Results      Pertinent findings on review include: Review of records  with relevant information reported in the HPI.  Reviewed pt's past medical record and obtained collateral information.      [place] PRESCRIPTION MONITORING PROGRAM [] was checked today:  not using controlled substances.          5/20/2024    10:43 AM 6/26/2024     7:57 AM   PHQ   PHQ-9 Total Score 12 10   Q9: Thoughts of better off dead/self-harm past 2 weeks Several days  Several days    F/U: Thoughts of " suicide or self-harm No  No    F/U: Safety concerns No  No        Patient-reported       RIANNA 7 Today: N/A      5/15/2024     5:05 PM   RIANNA-7 SCORE   Total Score 15 (severe anxiety)   Total Score 15       Recent Labs   Lab Test 02/20/24  0752 09/27/22  1632 02/03/20  1745   CR 0.82 0.86 0.78   GFRESTIMATED >90 >90 >90     Recent Labs   Lab Test 01/24/19  0821 11/23/16  0931   AST 10  --    ALT 13 12     PSYCHOTROPIC DRUG INTERACTIONS:    Wellbutrin---Zoloft: Concurrent use of BUPROPION and SEIZURE THRESHOLD LOWERING AGENTS may result in increased risk of seizures.   MANAGEMENT:  routine monitoring and pt is aware of the risks.    Impression/Assessment      Mallory Barajas is a 34 year old adult  who presents for med management follow up.  Pt appears stable in their mood and anxiety, denies SI, SIB or HI during the appointment. Reports doing well. Some alcohol use more than what they want, but mostly manageable. Since doing well, wants to continue on current medication regimen. OK to continue on current medication regimen. Discussed possible use of Gabapentin to reduce alcohol craving, but pt declines this today. Pt also declined Zoloft refill today as they continue to get refill from previous provider.    Diagnosis                                                                    MDD  RIANNA  R/out binge drinking    Treatment Recommendation & Plan       Medication Ordered/Consults/Labs/tests Ordered:     Medication: Continue on current medication regimen.   OTC Recommendations: none  Lab Orders:  none  Referrals: none  Release of Information: none  Future Treatment Considerations: Per symptoms. Gabapentin if alcohol craving in the future.  Return for Follow Up: in 4 months per pt's request    -Discussed safety plan for suicidal thoughts  -Discussed plan for suicidality  -Discussed available emergency services  -Patient agrees with the treatment plan  -Encouraged to continue outpatient therapy to gain more coping mechanism  for stress.    Treatment Risk Statement: Discussed with the patient my impressions, as well as recommended studies. I educated patient on the differential diagnosis and prognosis. I discussed with the patient the risks and benefits of medications versus no interventions, including efficacy, dose, possible side effects and length of treatment and the importance of medication compliance.  The patient understands the risks, benefits, adverse effects and alternatives. Agrees to treatment with the capacity to do so. No medical contraindications to treatment. The patient also understands the risks of using street drugs or alcohol.     CRISIS NUMBERS:   Provided routinely in AVS.      Diagnosis or treatment significantly limited by social determinants of health.    The longitudinal plan of care for the diagnosis(es)/condition(s) as documented were addressed during this visit. Due to the added complexity in care, I will continue to support Mallory in the subsequent management and with ongoing continuity of care.        Coral Adam, RANJEET,  10/23/2024

## 2024-10-29 NOTE — PROGRESS NOTES
Outcome for 10/29/24 11:38 AM: Data uploaded on Dexcom and Simple Titheluiz Gandara MA  Outcome for 11/11/24 8:18 AM: Data obtained via Dexcom and [employer] website  Tahira Gandara MA

## 2024-11-07 DIAGNOSIS — E10.9 TYPE 1 DIABETES MELLITUS WITHOUT COMPLICATION (H): ICD-10-CM

## 2024-11-07 RX ORDER — PROCHLORPERAZINE 25 MG/1
SUPPOSITORY RECTAL
Qty: 9 EACH | Refills: 3 | Status: SHIPPED | OUTPATIENT
Start: 2024-11-07

## 2024-11-13 ENCOUNTER — VIRTUAL VISIT (OUTPATIENT)
Dept: ENDOCRINOLOGY | Facility: CLINIC | Age: 34
End: 2024-11-13
Payer: COMMERCIAL

## 2024-11-13 ENCOUNTER — TELEPHONE (OUTPATIENT)
Dept: EDUCATION SERVICES | Facility: CLINIC | Age: 34
End: 2024-11-13

## 2024-11-13 DIAGNOSIS — E10.9 TYPE 1 DIABETES MELLITUS WITHOUT COMPLICATION (H): ICD-10-CM

## 2024-11-13 RX ORDER — INSULIN GLARGINE 100 [IU]/ML
INJECTION, SOLUTION SUBCUTANEOUS
Qty: 10 ML | Refills: 1 | Status: SHIPPED | OUTPATIENT
Start: 2024-11-13

## 2024-11-13 ASSESSMENT — PAIN SCALES - GENERAL: PAINLEVEL_OUTOF10: NO PAIN (0)

## 2024-11-13 NOTE — NURSING NOTE
Current patient location: [address] [place] [id number]    Is the patient currently in the state of [place]? YES    Visit mode:VIDEO    If the visit is dropped, the patient can be reconnected by:VIDEO VISIT: Text to cell phone:   Telephone Information:   Mobile [phone]    and VIDEO VISIT: Send to e-mail at: [email or web address]    Will anyone else be joining the visit? NO  (If patient encounters technical issues they should call [phone])    Are changes needed to the allergy or medication list? No    Are refills needed on medications prescribed by this physician? Discuss with provider    Rooming Documentation:  Not applicable    Reason for visit: RECHECK (Diabetes follow up/)    Mellisa PHAM

## 2024-11-13 NOTE — LETTER
11/13/2024       RE: Mallory Barajas  [address] [facility] [address]     Dear Colleague,    Thank you for referring your patient, Mallory Barajas, to the [facility] [place] at [facility]. Please see a copy of my visit note below.    Outcome for 10/29/24 11:38 AM: Data uploaded on Dexcom and [employer]  Tahira Gandara MA  Outcome for 11/11/24 8:18 AM: Data obtained via Dexcom and [employer] website  Tahira Gandara MA                            Virtual Visit Details    Type of service:  Video Visit     Originating Location (pt. Location):     Distant Location (provider location):    Platform used for Video Visit:     Time of start: 9:03 AM  Time of end: 9:18 AM  Total duration of video visit: 15 minutes.  Providers location: offsite.  Patients location: [place].    [facility]  Mallory Barajas is a 34 year old female with type 1 diabetes mellitus.  Video visit today for diabetes follow up.  She has been seeing Veronica Joseph RD/[facility] for diabetes education.  Pt was dx having type 1 diabetes mellitus in May 2015. No hx of retinopathy, nephropathy or neuropathy.  Mallory is currently using an OmniPod5 insulin pump and DexcomG6 sensor.  Her current basal insulin rate is set at 0.25 units/hr.   Her I/C ratio is 1:15 and CF 58.  Most recent A1C was 6.4 % on 2/20/2024.   I reviewed and scanned her OmniPod insulin pump and Dexcom sensor download data in her note below.  She continues to have some hypoglycemia post exercise and after walking her dog.  She forgets to use the activity mode feature on her insulin pump.  Pt also overestimates her carbs and will sometimes give herself insulin before eating and she gets distracted by something and forgets to eat and has hypoglycemia.  She has not been tested yet for ADHD. She will work on getting this scheduled.  She has been seeing a psychiatrist for her depression.  Her average glucose is 144.  Glucose in target range 73 % of the  time with 7 % below target range.  Patient denies any severe hypoglycemia.  On ROS today, Mallory eats healthy and is very active with sports. She continues to teach and .  Seeing a psychiatrist for her depression.  Still needs testing for ADHD.  She denies blurred vision, n/v, SOB at rest or cough. No chest pain, abd pain, diarrhea, dysuria or hematuria.  She denies numbness or tingling in her feet or hands.  Mallory denies any foot ulcers at this time.    DIABETES CARE:   Retinopathy: none.  Patient seen by ophthalmology on 10/12/2024 with no diabetic retinopathy.    Nephropathy: none; urine microalbuminuria negative in February 2024.  Neuropathy: none.  Foot exam: no exam today.  Lipids: LDL 80 in January 2024.  CAD: no.  Mental mera:  depression.    Insulin: OmniPod 5 insulin pump.  Testing: DexcomG6 sensor.  Hypoglycemia: Baqsimi.  Back up insulin: RX for Lantus vial sent to pharmacy today to use in case of insulin pump failure.            ROS  Please see under HPI.    Allergies  No Known Allergies    MEDICATIONS:  Reviewed with pt today.    Family History  Mother with hx of type 2 diabetes.  First cousin with hx of type 1 diabetes.    Social History  Smoke: none.  ETOH: rare.  Occupation: Math and .      Past Medical History  Past Medical History:   Diagnosis Date     Diabetes (H) 5/28/2015     NO ACTIVE PROBLEMS (aka NONE)      Past Surgical History:   Procedure Laterality Date     ARTHROSCOPY KNEE BILATERAL         Physical Exam    No exam today.    RESULTS  Creatinine   Date Value Ref Range Status   02/20/2024 0.82 0.51 - 0.95 mg/dL Final   02/03/2020 0.78 0.52 - 1.04 mg/dL Final     GFR Estimate   Date Value Ref Range Status   02/20/2024 >90 >60 mL/min/1.73m2 Final   02/03/2020 >90 >60 mL/min/[1.73_m2] Final     Comment:     Non  GFR Calc  Starting 12/18/2018, serum creatinine based estimated GFR (eGFR) will be   calculated using the Chronic Kidney Disease Epidemiology Collaboration    (CKD-EPI) equation.       Hemoglobin A1C   Date Value Ref Range Status   02/20/2024 6.4 (H) 0.0 - 5.6 % Final     Comment:     Normal <5.7%   Prediabetes 5.7-6.4%    Diabetes 6.5% or higher     Note: Adopted from ADA consensus guidelines.   05/28/2015 14.0 (H) 4.3 - 6.0 % Final     Potassium   Date Value Ref Range Status   05/29/2015 3.5 3.4 - 5.3 mmol/L Final     ALT   Date Value Ref Range Status   01/24/2019 13 0 - 50 U/L Final     AST   Date Value Ref Range Status   01/24/2019 10 0 - 45 U/L Final     TSH   Date Value Ref Range Status   01/24/2019 0.96 0.40 - 4.00 mU/L Final     T4 Free   Date Value Ref Range Status   11/23/2016 1.05 0.76 - 1.46 ng/dL Final       ASSESSMENT/PLAN:    1. TYPE 1 DIABETES MELLITUS: Mallory will try using the activity mode feature on her insulin pump 1 hr before, during and 1 hr post exercise/walking the dog.  If she gives herself an insulin bolus she will work on trying to be sure to eat within 5-10 minutes.  As above, she will schedule testing for ADHD.  No change in insulin pump settings today.  She continues to eat healthy and remains active.  Ophthalmology exam done on 10/12/2024 showed no diabetic retinopathy.  Pt's urine microalbuminuria negative in February 2024 with a normal creat/GFR.    Mallory denies sx of diabetic peripheral neuropathy or foot ulcers at this time.  Her LDL 88 in January 2024.  No vitals today.    2. MENTAL HEALTH: Continue to see psychiatrist.  Have ADHD testing done.    3. HEALTH MAINTENANCE: Referred to primary care clinic today to establish care with a primary care provider.    4.  FOLLOW UP: With Dr. Cantrell in clinic April 2025 and with me in 9/2025.  Referral to be seen in [facility] placed today to establish care with primary care provider.  A1C and annual fasting diabetes labs ordered today.  I asked our staff to schedule patient a lab appointment.  Lantus vial refilled today for backup insulin in case insulin pump fails.      Time spent reviewing chart,  labs, OmniPod5 insulin pump and DexcomG6 sensor data today = 5 minutes.  Time for video visit today= 15 minutes.  Time for documentation today = 10 minutes    TOTAL TIME FOR VISIT TODAY = 30 minutes.    Sara Larry PA-C              Again, thank you for allowing me to participate in the care of your patient.      Sincerely,    Sara Larry PA-C

## 2024-11-13 NOTE — TELEPHONE ENCOUNTER
Left Voicemail (1st Attempt) for the patient to call back and schedule the following:    Appointment type: Diabetes ed   Provider: Veronica  Return date: next avail   Specialty phone number: [phone]   Additional appointment(s) needed:   Additonal Notes: Maritza EVANS Susan McAloon, GARY  P [id number]  Please schedule follow up appt with Veronica Joseph RD/CECILIA.    Josiane Poole on 11/13/2024 at 2:38 PM

## 2024-11-13 NOTE — PROGRESS NOTES
Time of start: 9:03 AM  Time of end: 9:18 AM  Total duration of video visit: 15 minutes.  Providers location: offsite.  Patients location: [place].    [facility]  Mallory Barajas is a 34 year old female with type 1 diabetes mellitus.  Video visit today for diabetes follow up.  She has been seeing Veronica Joseph RD/CECILIA for diabetes education.  Pt was dx having type 1 diabetes mellitus in May 2015. No hx of retinopathy, nephropathy or neuropathy.  Mallory is currently using an OmniPod5 insulin pump and DexcomG6 sensor.  Her current basal insulin rate is set at 0.25 units/hr.   Her I/C ratio is 1:15 and CF 58.  Most recent A1C was 6.4 % on 2/20/2024.   I reviewed and scanned her OmniPod insulin pump and Dexcom sensor download data in her note below.  She continues to have some hypoglycemia post exercise and after walking her dog.  She forgets to use the activity mode feature on her insulin pump.  Pt also overestimates her carbs and will sometimes give herself insulin before eating and she gets distracted by something and forgets to eat and has hypoglycemia.  She has not been tested yet for ADHD. She will work on getting this scheduled.  She has been seeing a psychiatrist for her depression.  Her average glucose is 144.  Glucose in target range 73 % of the time with 7 % below target range.  Patient denies any severe hypoglycemia.  On ROS today, Mallory eats healthy and is very active with sports. She continues to teach and .  Seeing a psychiatrist for her depression.  Still needs testing for ADHD.  She denies blurred vision, n/v, SOB at rest or cough. No chest pain, abd pain, diarrhea, dysuria or hematuria.  She denies numbness or tingling in her feet or hands.  Mallory denies any foot ulcers at this time.    DIABETES CARE:   Retinopathy: none.  Patient seen by ophthalmology on 10/12/2024 with no diabetic retinopathy.    Nephropathy: none; urine microalbuminuria negative in February 2024.  Neuropathy: none.  Foot exam: no exam today.  Lipids:  LDL 80 in January 2024.  CAD: no.  Mental mera:  depression.    Insulin: OmniPod 5 insulin pump.  Testing: DexcomG6 sensor.  Hypoglycemia: Baqsimi.  Back up insulin: RX for Lantus vial sent to pharmacy today to use in case of insulin pump failure.            ROS  Please see under HPI.    Allergies  No Known Allergies    MEDICATIONS:  Reviewed with pt today.    Family History  Mother with hx of type 2 diabetes.  First cousin with hx of type 1 diabetes.    Social History  Smoke: none.  ETOH: rare.  Occupation: Math and .      Past Medical History  Past Medical History:   Diagnosis Date    Diabetes (H) 5/28/2015    NO ACTIVE PROBLEMS (aka NONE)      Past Surgical History:   Procedure Laterality Date    ARTHROSCOPY KNEE BILATERAL         Physical Exam    No exam today.    RESULTS  Creatinine   Date Value Ref Range Status   02/20/2024 0.82 0.51 - 0.95 mg/dL Final   02/03/2020 0.78 0.52 - 1.04 mg/dL Final     GFR Estimate   Date Value Ref Range Status   02/20/2024 >90 >60 mL/min/1.73m2 Final   02/03/2020 >90 >60 mL/min/[1.73_m2] Final     Comment:     Non  GFR Calc  Starting 12/18/2018, serum creatinine based estimated GFR (eGFR) will be   calculated using the Chronic Kidney Disease Epidemiology Collaboration   (CKD-EPI) equation.       Hemoglobin A1C   Date Value Ref Range Status   02/20/2024 6.4 (H) 0.0 - 5.6 % Final     Comment:     Normal <5.7%   Prediabetes 5.7-6.4%    Diabetes 6.5% or higher     Note: Adopted from ADA consensus guidelines.   05/28/2015 14.0 (H) 4.3 - 6.0 % Final     Potassium   Date Value Ref Range Status   05/29/2015 3.5 3.4 - 5.3 mmol/L Final     ALT   Date Value Ref Range Status   01/24/2019 13 0 - 50 U/L Final     AST   Date Value Ref Range Status   01/24/2019 10 0 - 45 U/L Final     TSH   Date Value Ref Range Status   01/24/2019 0.96 0.40 - 4.00 mU/L Final     T4 Free   Date Value Ref Range Status   11/23/2016 1.05 0.76 - 1.46 ng/dL Final       ASSESSMENT/PLAN:    1. TYPE  1 DIABETES MELLITUS: Mallory will try using the activity mode feature on her insulin pump 1 hr before, during and 1 hr post exercise/walking the dog.  If she gives herself an insulin bolus she will work on trying to be sure to eat within 5-10 minutes.  As above, she will schedule testing for ADHD.  No change in insulin pump settings today.  She continues to eat healthy and remains active.  Ophthalmology exam done on 10/12/2024 showed no diabetic retinopathy.  Pt's urine microalbuminuria negative in February 2024 with a normal creat/GFR.    Mallory denies sx of diabetic peripheral neuropathy or foot ulcers at this time.  Her LDL 88 in January 2024.  No vitals today.    2. MENTAL HEALTH: Continue to see psychiatrist.  Have ADHD testing done.    3. HEALTH MAINTENANCE: Referred to primary care clinic today to establish care with a primary care provider.    4.  FOLLOW UP: With Dr. Cantrell in clinic April 2025 and with me in 9/2025.  Referral to be seen in [facility] placed today to establish care with primary care provider.  A1C and annual fasting diabetes labs ordered today.  I asked our staff to schedule patient a lab appointment.  Lantus vial refilled today for backup insulin in case insulin pump fails.      Time spent reviewing chart, labs, OmniPod5 insulin pump and DexcomG6 sensor data today = 5 minutes.  Time for video visit today= 15 minutes.  Time for documentation today = 10 minutes    TOTAL TIME FOR VISIT TODAY = 30 minutes.    Sara Larry PA-C

## 2024-11-15 NOTE — TELEPHONE ENCOUNTER
Left Voicemail (2nd Attempt) for the patient to call back and schedule the following:     Appointment type: Diabetes ed   Provider: Veronica Joseph  Return date: next avail   Specialty phone number: [phone]   Additional appointment(s) needed:   Additonal Notes: LVM x2, MyC x1   Sara Larry, GARY  P [id number]  Please schedule follow up appt with Veronica Joseph RD/SHAI Mcarthur on 11/15/2024 at 9:07 AM

## 2024-11-27 ENCOUNTER — LAB (OUTPATIENT)
Dept: LAB | Facility: CLINIC | Age: 34
End: 2024-11-27
Payer: COMMERCIAL

## 2024-11-27 DIAGNOSIS — E10.9 TYPE 1 DIABETES MELLITUS WITHOUT COMPLICATION (H): ICD-10-CM

## 2024-11-27 LAB
AST SERPL W P-5'-P-CCNC: 24 U/L (ref 0–45)
CHOLEST SERPL-MCNC: 182 MG/DL
CREAT SERPL-MCNC: 0.93 MG/DL (ref 0.51–0.95)
CREAT UR-MCNC: 101 MG/DL
EGFRCR SERPLBLD CKD-EPI 2021: 82 ML/MIN/1.73M2
EST. AVERAGE GLUCOSE BLD GHB EST-MCNC: 140 MG/DL
FASTING STATUS PATIENT QL REPORTED: NO
HBA1C MFR BLD: 6.5 % (ref 0–5.6)
HDLC SERPL-MCNC: 89 MG/DL
LDLC SERPL CALC-MCNC: 77 MG/DL
MICROALBUMIN UR-MCNC: <12 MG/L
MICROALBUMIN/CREAT UR: NORMAL MG/G{CREAT}
NONHDLC SERPL-MCNC: 93 MG/DL
TRIGL SERPL-MCNC: 82 MG/DL
TSH SERPL DL<=0.005 MIU/L-ACNC: 1.16 UIU/ML (ref 0.3–4.2)

## 2024-11-27 PROCEDURE — 82570 ASSAY OF URINE CREATININE: CPT

## 2024-11-27 PROCEDURE — 84450 TRANSFERASE (AST) (SGOT): CPT

## 2024-11-27 PROCEDURE — 82043 UR ALBUMIN QUANTITATIVE: CPT

## 2024-11-27 PROCEDURE — 82565 ASSAY OF CREATININE: CPT

## 2024-11-27 PROCEDURE — 36415 COLL VENOUS BLD VENIPUNCTURE: CPT

## 2024-11-27 PROCEDURE — 80061 LIPID PANEL: CPT

## 2024-11-27 PROCEDURE — 84443 ASSAY THYROID STIM HORMONE: CPT

## 2024-11-27 PROCEDURE — 83036 HEMOGLOBIN GLYCOSYLATED A1C: CPT

## 2024-12-08 ENCOUNTER — MYC REFILL (OUTPATIENT)
Dept: ENDOCRINOLOGY | Facility: CLINIC | Age: 34
End: 2024-12-08
Payer: COMMERCIAL

## 2024-12-08 DIAGNOSIS — E10.9 TYPE 1 DIABETES MELLITUS WITHOUT COMPLICATION (H): ICD-10-CM

## 2024-12-09 RX ORDER — PROCHLORPERAZINE 25 MG/1
SUPPOSITORY RECTAL
Qty: 9 EACH | Refills: 3 | Status: SHIPPED | OUTPATIENT
Start: 2024-12-09

## 2024-12-12 ENCOUNTER — TELEPHONE (OUTPATIENT)
Dept: ENDOCRINOLOGY | Facility: CLINIC | Age: 34
End: 2024-12-12
Payer: COMMERCIAL

## 2024-12-12 NOTE — TELEPHONE ENCOUNTER
Left Voicemail (1st Attempt) for the patient to call back and schedule the following:    Appointment type: return diabetes   Provider: Laron   Return date: September 2025  Specialty phone number: [phone]  Additional appointment(s) needed: Diabetes ed, matt, next avail   Additonal Notes: LVM, MyC x1   Check Out Comments from visit on 11/13:   1. Please schedule pt lab appt  at [address] [place] [address] for fasting labs. (Done)  2. Schedule pt with PCP at [address] [place] [address]. (Done)  3. Schedule pt appt for follow up with Matt Joseph RD/CDE.  4. Appt with me in early 9/2025     Josiane Poole on 12/12/2024 at 3:17 PM

## 2025-01-01 ASSESSMENT — PATIENT HEALTH QUESTIONNAIRE - PHQ9
SUM OF ALL RESPONSES TO PHQ QUESTIONS 1-9: 6
10. IF YOU CHECKED OFF ANY PROBLEMS, HOW DIFFICULT HAVE THESE PROBLEMS MADE IT FOR YOU TO DO YOUR WORK, TAKE CARE OF THINGS AT HOME, OR GET ALONG WITH OTHER PEOPLE: NOT DIFFICULT AT ALL
SUM OF ALL RESPONSES TO PHQ QUESTIONS 1-9: 6

## 2025-01-02 ENCOUNTER — ALLIED HEALTH/NURSE VISIT (OUTPATIENT)
Dept: EDUCATION SERVICES | Facility: CLINIC | Age: 35
End: 2025-01-02
Payer: COMMERCIAL

## 2025-01-02 ENCOUNTER — LAB (OUTPATIENT)
Dept: LAB | Facility: CLINIC | Age: 35
End: 2025-01-02
Payer: COMMERCIAL

## 2025-01-02 ENCOUNTER — OFFICE VISIT (OUTPATIENT)
Dept: INTERNAL MEDICINE | Facility: CLINIC | Age: 35
End: 2025-01-02
Attending: PHYSICIAN ASSISTANT
Payer: COMMERCIAL

## 2025-01-02 VITALS
RESPIRATION RATE: 16 BRPM | DIASTOLIC BLOOD PRESSURE: 72 MMHG | WEIGHT: 183.4 LBS | BODY MASS INDEX: 26.32 KG/M2 | OXYGEN SATURATION: 98 % | HEART RATE: 83 BPM | TEMPERATURE: 98.2 F | SYSTOLIC BLOOD PRESSURE: 118 MMHG

## 2025-01-02 DIAGNOSIS — Z11.4 SCREENING FOR HIV (HUMAN IMMUNODEFICIENCY VIRUS): ICD-10-CM

## 2025-01-02 DIAGNOSIS — E10.9 TYPE 1 DIABETES MELLITUS WITHOUT COMPLICATION (H): Primary | ICD-10-CM

## 2025-01-02 DIAGNOSIS — Z12.4 CERVICAL CANCER SCREENING: Primary | ICD-10-CM

## 2025-01-02 DIAGNOSIS — E10.9 TYPE 1 DIABETES MELLITUS WITHOUT COMPLICATION (H): ICD-10-CM

## 2025-01-02 DIAGNOSIS — Z11.59 NEED FOR HEPATITIS C SCREENING TEST: ICD-10-CM

## 2025-01-02 DIAGNOSIS — Z11.4 SCREENING FOR HIV (HUMAN IMMUNODEFICIENCY VIRUS): Primary | ICD-10-CM

## 2025-01-02 DIAGNOSIS — Z00.00 ROUTINE GENERAL MEDICAL EXAMINATION AT A HEALTH CARE FACILITY: ICD-10-CM

## 2025-01-02 DIAGNOSIS — R79.89 ELEVATED SERUM CREATININE: ICD-10-CM

## 2025-01-02 LAB
ANION GAP SERPL CALCULATED.3IONS-SCNC: 8 MMOL/L (ref 7–15)
BUN SERPL-MCNC: 16.8 MG/DL (ref 6–20)
CALCIUM SERPL-MCNC: 9.1 MG/DL (ref 8.8–10.4)
CHLORIDE SERPL-SCNC: 102 MMOL/L (ref 98–107)
CREAT SERPL-MCNC: 1.11 MG/DL (ref 0.51–0.95)
EGFRCR SERPLBLD CKD-EPI 2021: 67 ML/MIN/1.73M2
ERYTHROCYTE [DISTWIDTH] IN BLOOD BY AUTOMATED COUNT: 11.9 % (ref 10–15)
GLUCOSE SERPL-MCNC: 171 MG/DL (ref 70–99)
HCO3 SERPL-SCNC: 29 MMOL/L (ref 22–29)
HCT VFR BLD AUTO: 39.5 % (ref 35–47)
HCV AB SERPL QL IA: NONREACTIVE
HGB BLD-MCNC: 13.2 G/DL (ref 11.7–15.7)
HIV 1+2 AB+HIV1 P24 AG SERPL QL IA: NONREACTIVE
HPV HR 12 DNA CVX QL NAA+PROBE: NEGATIVE
HPV16 DNA CVX QL NAA+PROBE: NEGATIVE
HPV18 DNA CVX QL NAA+PROBE: NEGATIVE
HUMAN PAPILLOMA VIRUS FINAL DIAGNOSIS: NORMAL
MCH RBC QN AUTO: 30.9 PG (ref 26.5–33)
MCHC RBC AUTO-ENTMCNC: 33.4 G/DL (ref 31.5–36.5)
MCV RBC AUTO: 93 FL (ref 78–100)
PLATELET # BLD AUTO: 289 10E3/UL (ref 150–450)
POTASSIUM SERPL-SCNC: 4.1 MMOL/L (ref 3.4–5.3)
RBC # BLD AUTO: 4.27 10E6/UL (ref 3.8–5.2)
SODIUM SERPL-SCNC: 139 MMOL/L (ref 135–145)
WBC # BLD AUTO: 6.1 10E3/UL (ref 4–11)

## 2025-01-02 PROCEDURE — 87624 HPV HI-RISK TYP POOLED RSLT: CPT | Performed by: INTERNAL MEDICINE

## 2025-01-02 PROCEDURE — 36415 COLL VENOUS BLD VENIPUNCTURE: CPT | Performed by: PATHOLOGY

## 2025-01-02 PROCEDURE — 86803 HEPATITIS C AB TEST: CPT | Performed by: INTERNAL MEDICINE

## 2025-01-02 PROCEDURE — 80048 BASIC METABOLIC PNL TOTAL CA: CPT | Performed by: PATHOLOGY

## 2025-01-02 PROCEDURE — G0145 SCR C/V CYTO,THINLAYER,RESCR: HCPCS | Performed by: INTERNAL MEDICINE

## 2025-01-02 PROCEDURE — 99000 SPECIMEN HANDLING OFFICE-LAB: CPT | Performed by: PATHOLOGY

## 2025-01-02 PROCEDURE — 87389 HIV-1 AG W/HIV-1&-2 AB AG IA: CPT | Performed by: INTERNAL MEDICINE

## 2025-01-02 PROCEDURE — 85027 COMPLETE CBC AUTOMATED: CPT | Performed by: PATHOLOGY

## 2025-01-02 NOTE — PROGRESS NOTES
DIABETES SELF-MANAGEMENT EDUCATION & SUPPORT    Presents for: Insulin Pump and CGM Review    Referred by: Dr. Cantrell and Pricila Larry PA-C      Assessment    No changes made to pump settings today.  Based on calculations, could strengthen carb ratio and correction, however because Mallory is so activity and has frequent hypoglycemia, will continue current settings.    Biggest concern for Mallory is frequent hypoglycemia related to exercise and getting distracted after taking a meal bolus and delaying eating.  She is reminded to use the activity feature.  We reviewed how and when to program this today.  She will be tested for ADHD in 1 day and feels this may help her with managing her sugars.      Insulin Pump Information  Insulin Pump Brand: OmniPod    Reports        PLAN    *Set reminder to use activity feature for walks and playing sports.  Start activity mode 1 hour prior to exercise.  *It's best to exercise when you have the least amount of insulin in your body, such as before breakfast.    *For high fat meals (greater than 20 grams of fat), take 1 unit of insulin for every 10 grams of fat above 20 grams 3 hours after the start of the meal.  *For high protein meals (greater than 3-4 oz meat), add 1 unit of insulin for every 20 grams of protein above 20 grams of protein.     Follow-up:   In 3- 4 months with endocrinology  In 6 months with diabetes education.    INTERVENTION    Bolusing:  -- Importance of blousing before meals and eating withing 15 mint  -- Use of extended bolus feature in automated mode (manually bolus 3 hrs later)    Activity & Lifestyle:  -- How to adjust for exercise using activity feature      SUBJECTIVE/OBJECTIVE:  Presents for: Insulin Pump and CGM Review  Accompanied by: Self  Diabetes education in the past 24mo: Yes  Focus of Visit: Insulin Pump  Diabetes type: Type 1  Date of diagnosis: 2015  Disease course: Stable  How confident are you filling out medical forms by yourself::  Extremely  Diabetes management related comments/concerns: Continues to get distracted easily, sometimes forgetting to eat after taking a meal bolus.  Getting tested for ADHD tomorrow.  Forgetting to use activity feature for walks and playing sports.  Cultural Influences/Ethnic Background:  Not  or     Lives with her wife and 2 dogs.  .      Diabetes Symptoms & Complications:  Diabetes Related Symptoms: None  Weight trend: Stable  Symptom course: Stable  Disease course: Stable       Patient Problem List and Family Medical History reviewed for relevant medical history, current medical status, and diabetes risk factors.    Labs:  Lab Results   Component Value Date    A1C 6.5 11/27/2024    A1C 14.0 05/28/2015    HEMOGLOBINA1 6.4 03/28/2023     Lab Results   Component Value Date     01/02/2025     05/29/2015       Healthy Eating:  Cultural/Latter day diet restrictions?: No  Meal planning/habits: Carb counting ([employer] kush has ability to record types of food in addition to carbs.  Plans to start using this.)  How many times a week on average do you eat food made away from home (restaurant/take-out)?: 3  Meals include: Breakfast, Lunch, Dinner, Afternoon Snack  Beverages: Water, Coffee, Diet soda    Being Active:  Exercise:: Yes  Days per week of moderate to strenuous exercise (like a brisk walk): 7 (Weight lifting mornings before work.  Competitive sports on weekends (basket ball, soccer, kickball).  Walks dogs in afternoon.)  On average, minutes per day of exercise at this level: 60  How intense was your typical exercise? : Very heavy (like fast running or stair climbing)  Exercise Minutes per Week: 420  Barrier to exercise: None    Monitoring:  Blood Glucose Meter: Accu-chek, CGM  Times checking blood sugar at home (number): 5+  Times checking blood sugar at home (per): Day      Taking Medications:  Diabetes Medication(s)       Diabetic Other       Glucagon  (BAQSIMI TWO PACK) 3 MG/DOSE nasal powder Spray 1 spray (3 mg) in nostril as needed (severe hypoglycemia)       Insulin       insulin glargine (LANTUS VIAL) 100 UNIT/ML vial Inject 6 units subcutaneous daily ONLY IF INSULIN PUMP FAILS.     insulin lispro (HUMALOG) 100 UNIT/ML Cartridge Use in pump aprox 50 units daily            Current Treatments: Insulin Pump    Problem Solving:  Is the patient at risk for hypoglycemia?: Yes  Hypoglycemia Frequency: Daily (After exercise or getting distracted and forgetting to eat after taking a meal bolus.)  Hypoglycemia Treatment:  (fruit snacks)  Medical ID: Yes    Hypoglycemia Symptoms  Hypoglycemia: None         Reducing Risks:  Has dilated eye exam at least once a year?: Yes  Sees dentist every 6 months?: Yes  Feet checked by healthcare provider in the last year?: Yes    Healthy Coping:  Informal Support system:: Family, Friends, Spouse      Veronica Joseph RDN, WAYNE, [facility]  Dietitian and Diabetes  - Endocrinology  [facility]      Time Spent: 30 minutes  Encounter Type: Individual    Any diabetes medication dose changes were made via the CDE Protocol per the patient's endocrinology provider. A copy of this encounter was shared with the provider.

## 2025-01-02 NOTE — PATIENT INSTRUCTIONS
Rayray Tejada,    It was a pleasure meeting with you today!    Here is a summary of our visit:    *Set reminder to use activity feature for walks and playing sports.  Start activity mode 1 hour prior to exercise.  *It's best to exercise when you have the least amount of insulin in your body, such as before breakfast.    *For high fat meals (greater than 20 grams of fat), take 1 unit of insulin for every 10 grams of fat above 20 grams 3 hours after the start of the meal.  *For high protein meals (greater than 3-4 oz meat), add 1 unit of insulin for every 20 grams of protein above 20 grams of protein.     Follow Up Plan:    In 3- 4 months with endocrinology  In 6 months with diabetes education.    Thank you,    Veronica Joseph RDN, WAYNEN, [facility]  Dietitian and Diabetes Education - Endocrinology  [facility]  [address] [place] [address]  Phone: [phone]  Email: [email or web address]    Contact information:   To schedule a diabetes education appointment:[phone].  For questions or concerns, please send a [facility] message or call the clinic at [phone].    For more urgent concerns that do not require 406, please call [phone] after hours/weekends and ask to speak with the Endocrinologist on call.       Please let us know if you are having low blood sugars less than 70 or over 300 mg/dL.  Do not wait until your next appointment if this is happening.

## 2025-01-02 NOTE — PROGRESS NOTES
"Assessment & Plan     Routine general medical examination at a health care facility  Patient is here to establish care  - REVIEW OF HEALTH MAINTENANCE PROTOCOL ORDERS    # Type 1 DM  Follows the diabetes clinic, uptodate with screening of diabetes complications  Has an nsulin pump  - CBC with platelets; Future  - Basic metabolic panel  (Ca, Cl, CO2, Creat, Gluc, K, Na, BUN); Future    # Depression  On bupropion and sertraline  Sees a therapist and psychiatrist  Reports feeling well    # Preventive measures  Cervical cancer screening: We performed pap smear with HPV testing with Dr. Parrish Aponte today.  Still menstruating, regular, no heavy bleeding  Not interested in STD screening  Sexually active, only exclusive to her wife  Overall, consumes healthy diet with vegetables and limited amount of red meat, we went over the basic principles of a healthy diet  Drinks alcohol socially with no hard liquor, x2 weekly   No smoking  No recreational drugs  Uptodate with all the vaccines, including COVID-19 booster and influenza  Most recent LDL a few months ago, which was normal  - HPV and Gynecologic Cytology Panel - Recommended Age 30-65 Years      ADDENDUM:  # Elevated Creatinine  We obtained BMP today as patient has not had any BMP for a long while. Cre came back 1.11 from prior 0.93 a month ago. Given the acute change, it is probably not due to diabetes and could be prerenal. Not on any nephrotoxic meds. I notified the patient on MyChart for the result and encouraged her to optimize hydration. We will recheck BMP next week to ensure it is not uptrending.  - BMP next week (1/6/2025-1/10/2025)      BMI  Estimated body mass index is 26.32 kg/m  as calculated from the following:    Height as of 10/23/24: 1.778 m (5' 10\").    Weight as of this encounter: 83.2 kg (183 lb 6.4 oz).   Weight management plan: Discussed healthy diet and exercise guidelines      No follow-ups on file.    Michel Tejada is a 34 year old, " presenting for the following health issues:  Establish Care (Pt here to establish care)      1/2/2025     9:11 AM   Additional Questions   Roomed by Latosha GARCIA     History of Present Illness       Reason for visit:  Seeing GP She is missing 1 dose(s) of medications per week.  She is not taking prescribed medications regularly due to remembering to take.       Review of Systems  Constitutional, HEENT, cardiovascular, pulmonary, gi and gu systems are negative, except as otherwise noted.      Objective    /72 (BP Location: Right arm, Patient Position: Sitting, Cuff Size: Adult Regular)   Pulse 83   Temp 98.2  F (36.8  C) (Oral)   Resp 16   Wt 83.2 kg (183 lb 6.4 oz)   SpO2 98%   BMI 26.32 kg/m    Body mass index is 26.32 kg/m .    Physical Exam   General appearance: No apparent medical distress  HEENT: Neck supple, sclera anicteric  CV: S1 and S2 well heard without any added sounds  Resp: Normal respiratory effort, clear to ausculation bilaterally without any added sounds  Abd: BS present, soft, non-tender, non-distended  Extr: No peripheral edema  Skin: Warm and dry  Neuro: Alert and oriented x4, appeared non-focal  Pelvic: External genitalia normal without lesions or erythema. Vaginal mucosa pink and moist. Cervix visualized, no lesions and normal discharge noted. Pap smear obtained without difficulty. No adnexal tenderness or masses palpated.      Staffed with Dr. Parrish Aponte      Signed Electronically by: Robert Barros MD

## 2025-01-03 ENCOUNTER — TRANSFERRED RECORDS (OUTPATIENT)
Dept: HEALTH INFORMATION MANAGEMENT | Facility: CLINIC | Age: 35
End: 2025-01-03

## 2025-01-03 LAB — PHQ9 SCORE: 13

## 2025-01-06 LAB
BKR AP ASSOCIATED HPV REPORT: NORMAL
BKR LAB AP GYN ADEQUACY: NORMAL
BKR LAB AP GYN INTERPRETATION: NORMAL
BKR LAB AP PREVIOUS ABNORMAL: NORMAL
PATH REPORT.COMMENTS IMP SPEC: NORMAL
PATH REPORT.COMMENTS IMP SPEC: NORMAL
PATH REPORT.RELEVANT HX SPEC: NORMAL

## 2025-02-03 ENCOUNTER — TELEPHONE (OUTPATIENT)
Dept: PSYCHIATRY | Facility: CLINIC | Age: 35
End: 2025-02-03
Payer: COMMERCIAL

## 2025-02-03 NOTE — TELEPHONE ENCOUNTER
8 pages of incoming records were received from [facility]. Writer sent a copy to scanning and a copy was held with writer until scanning complete.

## 2025-02-16 ENCOUNTER — MYC REFILL (OUTPATIENT)
Dept: ENDOCRINOLOGY | Facility: CLINIC | Age: 35
End: 2025-02-16
Payer: COMMERCIAL

## 2025-02-16 DIAGNOSIS — E10.9 TYPE 1 DIABETES MELLITUS WITHOUT COMPLICATION (H): ICD-10-CM

## 2025-02-17 RX ORDER — INSULIN LISPRO 100 [IU]/ML
INJECTION, SOLUTION INTRAVENOUS; SUBCUTANEOUS
Qty: 50 ML | Refills: 3 | Status: SHIPPED | OUTPATIENT
Start: 2025-02-17

## 2025-02-18 ENCOUNTER — MYC MEDICAL ADVICE (OUTPATIENT)
Dept: INTERNAL MEDICINE | Facility: CLINIC | Age: 35
End: 2025-02-18
Payer: COMMERCIAL

## 2025-02-18 DIAGNOSIS — B00.1 COLD SORE: Primary | ICD-10-CM

## 2025-02-18 ASSESSMENT — ANXIETY QUESTIONNAIRES
4. TROUBLE RELAXING: MORE THAN HALF THE DAYS
3. WORRYING TOO MUCH ABOUT DIFFERENT THINGS: SEVERAL DAYS
6. BECOMING EASILY ANNOYED OR IRRITABLE: SEVERAL DAYS
GAD7 TOTAL SCORE: 9
7. FEELING AFRAID AS IF SOMETHING AWFUL MIGHT HAPPEN: NOT AT ALL
1. FEELING NERVOUS, ANXIOUS, OR ON EDGE: SEVERAL DAYS
5. BEING SO RESTLESS THAT IT IS HARD TO SIT STILL: MORE THAN HALF THE DAYS
GAD7 TOTAL SCORE: 9
GAD7 TOTAL SCORE: 9
8. IF YOU CHECKED OFF ANY PROBLEMS, HOW DIFFICULT HAVE THESE MADE IT FOR YOU TO DO YOUR WORK, TAKE CARE OF THINGS AT HOME, OR GET ALONG WITH OTHER PEOPLE?: SOMEWHAT DIFFICULT
7. FEELING AFRAID AS IF SOMETHING AWFUL MIGHT HAPPEN: NOT AT ALL
IF YOU CHECKED OFF ANY PROBLEMS ON THIS QUESTIONNAIRE, HOW DIFFICULT HAVE THESE PROBLEMS MADE IT FOR YOU TO DO YOUR WORK, TAKE CARE OF THINGS AT HOME, OR GET ALONG WITH OTHER PEOPLE: SOMEWHAT DIFFICULT
2. NOT BEING ABLE TO STOP OR CONTROL WORRYING: MORE THAN HALF THE DAYS

## 2025-02-18 ASSESSMENT — PATIENT HEALTH QUESTIONNAIRE - PHQ9
SUM OF ALL RESPONSES TO PHQ QUESTIONS 1-9: 11
SUM OF ALL RESPONSES TO PHQ QUESTIONS 1-9: 11
10. IF YOU CHECKED OFF ANY PROBLEMS, HOW DIFFICULT HAVE THESE PROBLEMS MADE IT FOR YOU TO DO YOUR WORK, TAKE CARE OF THINGS AT HOME, OR GET ALONG WITH OTHER PEOPLE: SOMEWHAT DIFFICULT

## 2025-02-19 ENCOUNTER — VIRTUAL VISIT (OUTPATIENT)
Dept: PSYCHIATRY | Facility: CLINIC | Age: 35
End: 2025-02-19
Attending: NURSE PRACTITIONER
Payer: COMMERCIAL

## 2025-02-19 VITALS — WEIGHT: 178 LBS | BODY MASS INDEX: 26.36 KG/M2 | HEIGHT: 69 IN

## 2025-02-19 DIAGNOSIS — F33.1 MAJOR DEPRESSIVE DISORDER, RECURRENT EPISODE, MODERATE (H): ICD-10-CM

## 2025-02-19 DIAGNOSIS — F41.1 GAD (GENERALIZED ANXIETY DISORDER): Primary | ICD-10-CM

## 2025-02-19 PROCEDURE — G2211 COMPLEX E/M VISIT ADD ON: HCPCS | Mod: 95 | Performed by: NURSE PRACTITIONER

## 2025-02-19 PROCEDURE — 96127 BRIEF EMOTIONAL/BEHAV ASSMT: CPT | Mod: 95 | Performed by: NURSE PRACTITIONER

## 2025-02-19 PROCEDURE — 98006 SYNCH AUDIO-VIDEO EST MOD 30: CPT | Performed by: NURSE PRACTITIONER

## 2025-02-19 RX ORDER — VALACYCLOVIR HYDROCHLORIDE 1 G/1
2000 TABLET, FILM COATED ORAL 2 TIMES DAILY
Qty: 4 TABLET | Refills: 0 | Status: SHIPPED | OUTPATIENT
Start: 2025-02-19 | End: 2025-02-20

## 2025-02-19 RX ORDER — SERTRALINE HYDROCHLORIDE 100 MG/1
150 TABLET, FILM COATED ORAL DAILY
Qty: 135 TABLET | Refills: 1 | Status: SHIPPED | OUTPATIENT
Start: 2025-02-19

## 2025-02-19 RX ORDER — BUPROPION HYDROCHLORIDE 150 MG/1
150 TABLET ORAL EVERY MORNING
Qty: 30 TABLET | Refills: 1 | Status: SHIPPED | OUTPATIENT
Start: 2025-02-19

## 2025-02-19 ASSESSMENT — PAIN SCALES - GENERAL: PAINLEVEL_OUTOF10: NO PAIN (0)

## 2025-02-19 NOTE — NURSING NOTE
Current patient location: At work      Is the patient currently in the state of [place]? YES    Visit mode: VIDEO    If the visit is dropped, the patient can be reconnected by:VIDEO VISIT: Text to cell phone:   Telephone Information:   Mobile [phone]       Will anyone else be joining the visit? NO  (If patient encounters technical issues they should call [phone] :[id number])    Are changes needed to the allergy or medication list? No    Are refills needed on medications prescribed by this physician? NO    Rooming Documentation:  Questionnaire(s) completed    Reason for visit: RECHECK    Mayte PHAM

## 2025-02-19 NOTE — PROGRESS NOTES
Virtual Visit Details    Type of service:  Video Visit     Originating Location (pt. Location): Other work  Distant Location (provider location):  Off-site  Platform used for Video Visit: [facility]      Psychiatry Clinic Progress Note                                                                  Patient Name: Mallory Barajas  YOB: 1990  MRN: [id number]  Date of Service:  02/19/2025  Last Seen: 10/23/2024    Mallory Barajas is a 34 year old nonbinary adult, assigned female at birth who uses the name Mallory and pronoun she, they. Pt wants they pronoun used today for charting purpose.     Mallory Barajas is a 34 year old adult who presents for ongoing psychiatric care.  Mallory was last seen in clinic on 10/23/2024.     At that time,     Medication Ordered/Consults/Labs/tests Ordered:     Medication: Continue on current medication regimen.   OTC Recommendations: none  Lab Orders:  none  Referrals: none  Release of Information: none  Future Treatment Considerations: Per symptoms. Gabapentin if alcohol craving in the future.  Return for Follow Up: in 4 months per pt's request    Pertinent Background:  Depression started around 18 in college. Passive SI also started during college, no hx of SA. Cutting started in college, last cutting in 2015. No psych hospitalization. Anxiety started around high school. Medical complication includes DM I.     Previous medication trials: Prozac (ineffective, early 20's)    Therapist: Shannan Chaudhary (weekly)     Interim History                                                                                                        4, 4     Per chart review, on 1/3/2025, pt had ADHD assessment and diagnosed with combined ADHD dx.    Since the last visit,  -Notes doing alright. However, reports lower mood since Jan. Irritable, resentful, denies SI, SIB or HI.  -Feels one of the reason they get resentful is not able start on tasks or complete things.  -Sleeping fairly well still.  -Made a goal of  not drinking 30 days from 1/6-2/8 and feels good about this. Next goal is try to limit alcohol use to x1 drink/week. But this feels more difficult as they have all or nothing thought process. Has a friend who is supporting this goal.  -While not drinking, resting pulse was lower which was better.  -BG was fluctuating during holidays. This is overall pattern during the year, but this year, A1C was higher than before.  -Feels worsening depression also made it difficult to have limit setting on eating sweets as they didn't care.    Denies any symptoms suggestive of hypomania or psychosis.    Current Suicidality/Hx of Suicide Attempts: Denies both  CoCominent Medical concerns: DM I.    Medication Side Effects: The patient denies all medication side effects.      Medical Review of Systems     Apart from the symptoms mentioned int he HPI, the 14 point review of systems, including constitutional, HEENT, cardiovascular, respiratory, gastrointestinal, genitourinary, musculoskeletal, integumentary, endocrine, neurological, hematologic and allergic is entirely negative.    Pregnant: None. Nursing: None, Contraception: partner not sperm producing.     Substance Use     -See HPI.  -Reports having 2 weeks stretch of >5 drinks x3/week, but not drinking for 3-4 months at a time.  Goes to bed 9:30pm, falls asleep within 30 min, wakes up 4:45am. Sleep throughout the night mostly. May wake up x1 for bathroom, but able to go back to sleep easily. Feels rested in AM. Hx of DWI in 2018 in [place]. One black out at home.  -Cannabis: gummies 5 mg every 4 months.  -Denies any other substance use.    Social/ Family History                                  [per patient report]                                 1ea,1ea     -Living arrangements: lives with spouse and feels safe.   -Social Support: spouse, parents, siblings, close friends. Parents, sister, some friends in [place], otherwise they are all in [place].   -Access to gun: Denies  -Denies trauma  history.   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 This year, teaching summer school.      Allergy                                Patient has no known allergies.    Current Medications                                                                                                       Current Outpatient Medications   Medication Sig Dispense Refill    acetone urine (KETOSTIX) test strip Use 1 strip to check urine ketones in the event of unexplained high blood glucose, or in the case of illness.90 day supply 50 strips 50 strip 11    blood glucose (ACCU-CHEK MACARIO PLUS) test strip USE TO TEST BLOOD SUGARS SIX TIMES DAILY OR AS DIRECTED 600 each 3    blood glucose monitoring (ACCU-CHEK MACARIO PLUS) meter device kit Use to test blood sugar 6 times daily or as directed. 1 kit 0    blood glucose monitoring (ULTRA THIN 30G) lancets Use to test blood sugar 6 times daily or as directed.whatever is covered 540 each 3    buPROPion (WELLBUTRIN XL) 300 MG 24 hr tablet Take 1 tablet (300 mg) by mouth every morning. 90 tablet 1    Continuous Blood Gluc  (DEXCOM G6 ) GHADA Use to read blood sugars as per 's instructions. 1 each 0    Continuous Glucose Sensor (DEXCOM G6 SENSOR) MISC CHANGE SENSOR EVERY 10 DAYS 9 each 3    Continuous Glucose Transmitter (DEXCOM G6 TRANSMITTER) MISC CHANGE TRANSMITTER EVERY 3 MONTHS 1 each 1    Glucagon (BAQSIMI TWO PACK) 3 MG/DOSE nasal powder Spray 1 spray (3 mg) in nostril as needed (severe hypoglycemia) 2 each 3    Insulin Disposable Pump (OMNIPOD 5 G6 INTRO, GEN 5,) KIT 1 kit as needed 1 kit 0    Insulin Disposable Pump (OMNIPOD 5 G6 PODS, GEN 5,) MISC 1 pod every 3 days 30 each 4    insulin glargine (LANTUS VIAL) 100 UNIT/ML vial Inject 6 units subcutaneous daily ONLY IF INSULIN PUMP FAILS. 10 mL 1    insulin lispro (HUMALOG) 100 UNIT/ML Cartridge Use in pump aprox 50 units daily 50 mL 3    insulin pen needle 30G X 5  "MM Use 4 pen needles daily or as directed. 120 each 12    insulin syringes, disposable, U-100 0.3 ML MISC Use for insulin ONLY IF INSULIN PUMP FAILS. 50 each 1    sertraline (ZOLOFT) 100 MG tablet Take 1.5 tablets by mouth daily      Wound Dressing Adhesive (MASTISOL ADHESIVE) LIQD Use for securing sensor 15 mL 1         Vitals                                                                                                                       3, 3   There were no vitals taken for this visit.        Mental Status Exam                                                                                   9, 14 cog      Alertness: alert  and oriented  Appearance:  Casually dressed and Adequately groomed  Behavior/Demeanor: cooperative, pleasant, and calm, with good  eye contact   Speech: regular rate and rhythm  Mood :  \"alright\"  Affect:  somewhat subdued ; was congruent to mood; was congruent to content  Thought Process (Associations):  Linear, and Goal directed  Thought process (Rate):  Normal  Thought content:  no overt psychosis, denies suicidal ideation, intent or thoughts, and patient does not appear to be responding to internal stimuli  Perception:  Reports none;  Denies auditory hallucinations and visual hallucinations  Attention/Concentration:  Normal  Memory:  Immediate recall intact and Short-term memory intact  Language: intact  Fund of Knowledge/Intelligence:  Average  Abstraction:  Normal  Insight:  Good  Judgment:  Good  Cognition: (6) does  appear grossly intact; formal cognitive testing was not done    Physical Exam     Motor activity/EPS:  Normal  Psychomotor: normal or unremarkable    Labs and Results      Pertinent findings on review include: Review of records  with relevant information reported in the HPI.  Reviewed pt's past medical record and obtained collateral information.      [place] PRESCRIPTION MONITORING PROGRAM [] was checked today:  not using controlled substances.    Answers submitted by the " "patient for this visit:  Patient Health Questionnaire (Submitted on 2/18/2025)  If you checked off any problems, how difficult have these problems made it for you to do your work, take care of things at home, or get along with other people?: Somewhat difficult  PHQ9 TOTAL SCORE: 11  Patient Health Questionnaire (G7) (Submitted on 2/18/2025)  RIANNA 7 TOTAL SCORE: 9        6/26/2024     7:57 AM 1/1/2025    10:07 AM 2/18/2025     5:35 PM   PHQ   PHQ-9 Total Score 10 6  11    Q9: Thoughts of better off dead/self-harm past 2 weeks Several days Not at all Several days   F/U: Thoughts of suicide or self-harm No  No   F/U: Safety concerns No  No       Patient-reported       RIANNA 7 Today: N/A      5/15/2024     5:05 PM 2/18/2025     5:36 PM   RIANNA-7 SCORE   Total Score 15 (severe anxiety) 9 (mild anxiety)   Total Score 15 9        Patient-reported       Recent Labs   Lab Test 01/02/25  1016 11/27/24  1459 02/20/24  0752   CR 1.11* 0.93 0.82   GFRESTIMATED 67 82 >90     Recent Labs   Lab Test 11/27/24  1459 01/24/19  0821   AST 24 10   ALT  --  13     PSYCHOTROPIC DRUG INTERACTIONS:    Wellbutrin---Zoloft: Concurrent use of BUPROPION and SEIZURE THRESHOLD LOWERING AGENTS may result in increased risk of seizures.   MANAGEMENT:  routine monitoring and pt is aware of the risks.    Impression/Assessment      Mallory Barajas is a 34 year old adult  who presents for med management follow up.  Pt appears somewhat subdued, but not anxious, denies SI, SIB or HI during the appointment. PHQ 9 and RIANNA 7 moderately elevated today. Pt noted lower mood with irritability since Jan. Was off of alcohol 30 days from beginning of Jan to Feb as a part of their goal. Considering to limit x1 alcohol/week, but feels this is more difficult than no alcohol and considering to continue abstinence from alcohol for now. Also feels similar to eating sweets with \"all or nothing\" mentality. A friend is supporting alcohol use limit.    Discussed possibility of " maximizing Zoloft as irritability is central to their depression. However, they noted difficulties with initiating and completing tasks are cause of irritability. Reviewed recent combined ADHD dx, risk of seizure increase with alcohol use and Wellbutrin use and pt decided to maximize Wellbutrin XL to 450 mg daily as this will help to motivate not to drink. PT was instructed to monitor anxiety, irritability and sleep difficulties and if this occurs, will go back Wellbutrin XL to 300 mg daily and maximize Zoloft. Also discussed Wellbutrin possibly reducing appetite and monitor BG closely. Further, also reviewed different medication treatment for ADHD in the future. Will continue on current Zoloft regimen for now.    Diagnosis                                                                    MDD  RIANNA  R/out binge drinking    Treatment Recommendation & Plan       Medication Ordered/Consults/Labs/tests Ordered:     Medication:   -Increase Wellbutrin XL to 450 mg daily for mood. Monitor for changes in appetite, irritability, anxiety and sleep  -Continue on current Zoloft regimen.  OTC Recommendations: none  Lab Orders:  none  Referrals: none  Release of Information: none  Future Treatment Considerations: Per symptoms. Gabapentin if alcohol craving in the future.  Return for Follow Up: in 4 weeks    -Discussed safety plan for suicidal thoughts  -Discussed plan for suicidality  -Discussed available emergency services  -Patient agrees with the treatment plan  -Encouraged to continue outpatient therapy to gain more coping mechanism for stress.    Treatment Risk Statement: Discussed with the patient my impressions, as well as recommended studies. I educated patient on the differential diagnosis and prognosis. I discussed with the patient the risks and benefits of medications versus no interventions, including efficacy, dose, possible side effects and length of treatment and the importance of medication compliance.  The  patient understands the risks, benefits, adverse effects and alternatives. Agrees to treatment with the capacity to do so. No medical contraindications to treatment. The patient also understands the risks of using street drugs or alcohol.     CRISIS NUMBERS:   Provided routinely in AVS.      Diagnosis or treatment significantly limited by social determinants of health.    The longitudinal plan of care for the diagnosis(es)/condition(s) as documented were addressed during this visit. Due to the added complexity in care, I will continue to support Mallory in the subsequent management and with ongoing continuity of care.        Coral Adam, RANJEET,  02/19/2025

## 2025-02-19 NOTE — PATIENT INSTRUCTIONS
-Increase Wellbutrin XL to 450 mg daily for mood. Monitor for changes in appetite, irritability, anxiety and sleep  -Continue on current Zoloft regimen.    Your next appointment is scheduled on 3/26/2025 (Wed) at 3:30pm.      **For crisis resources, please see the information at the end of this document**   Patient Education    Thank you for coming to the [facility] [place] [facility].     Lab Testing:  If you had lab testing today and your results are reassuring or normal they will be mailed to you or sent through [employer] within 7 days. If the lab tests need quick action we will call you with the results. The phone number we will call with results is # [phone]. If this is not the best number please call our clinic and change the number.     Medication Refills:  If you need any refills please call your pharmacy and they will contact us. Our fax number for refills is [phone].   Three business days of notice are needed for general medication refill requests.   Five business days of notice are needed for controlled substance refill requests.   If you need to change to a different pharmacy, please contact the new pharmacy directly. The new pharmacy will help you get your medications transferred.     Contact Us:  Please call [phone] during business hours (8-5:00 M-F).   If you have medication related questions after clinic hours, or on the weekend, please call [phone].     Financial Assistance [phone]   Medical Records [phone]       MENTAL HEALTH CRISIS RESOURCES:  For a emergency help, please call 911 or go to the nearest Emergency Department.     Emergency Walk-In Options:   EmPATH Unit @ [place] [facility] ([place]): [phone] - Specialized mental health emergency area designed to be calming  [facility] ([place]): [phone]  [facility] ([place]): [phone]  [facility] ([place]):  [phone]    [facility] Crisis Information:   [place]: [phone]  [place]: [phone]  [place] ([place]) - Adult: [phone]     Child: [phone]  [place] - Adult: [phone]     Child: [phone]  [place]: [phone]  List of all [facility] resources:   https://[place].gov/dhs/people-we-serve/adults/health-care/mental-health/resources/crisis-contacts.jsp    National Crisis Information:   Crisis Text Line: Text  [place]  to 580180  Suicide & Crisis Lifeline: 988  National Suicide Prevention Lifeline: 3-473-249-TALK (1-281.136.7353)       For online chat options, visit https://suicidepreventionlifeline.org/chat/  Poison Control Center: 1-716.869.4605  Trans Lifeline: 1-265.276.1358 - Hotline for transgender people of all ages  The Jacky Project: 7-906-803-4428 - Hotline for LGBT youth     For Non-Emergency Support:   Fast Tracker: Mental Health & Substance Use Disorder Resources -   https://www.[employer].org/

## 2025-03-14 DIAGNOSIS — E10.9 TYPE 1 DIABETES MELLITUS WITHOUT COMPLICATION (H): ICD-10-CM

## 2025-03-17 RX ORDER — INSULIN LISPRO 100 [IU]/ML
INJECTION, SOLUTION INTRAVENOUS; SUBCUTANEOUS
Qty: 50 ML | Refills: 3 | Status: SHIPPED | OUTPATIENT
Start: 2025-03-17

## 2025-03-17 NOTE — TELEPHONE ENCOUNTER
insulin lispro (HUMALOG) 100 UNIT/ML Cartridge         Last Written Prescription Date:  2/17/25  Last Fill Quantity: 50 ml,   # refills: 3  Last Office Visit : 11/13/24  Future Office visit:  4/22/25    Routing refill request to provider for review/approval because  Insulin and insulin pump supplies - refilled per Endocrine clinic.

## 2025-03-26 ENCOUNTER — VIRTUAL VISIT (OUTPATIENT)
Dept: PSYCHIATRY | Facility: CLINIC | Age: 35
End: 2025-03-26
Attending: NURSE PRACTITIONER
Payer: COMMERCIAL

## 2025-03-26 ENCOUNTER — MYC REFILL (OUTPATIENT)
Dept: PSYCHIATRY | Facility: CLINIC | Age: 35
End: 2025-03-26
Payer: COMMERCIAL

## 2025-03-26 DIAGNOSIS — F33.0 MAJOR DEPRESSIVE DISORDER, RECURRENT EPISODE, MILD: ICD-10-CM

## 2025-03-26 DIAGNOSIS — F33.1 MAJOR DEPRESSIVE DISORDER, RECURRENT EPISODE, MODERATE (H): ICD-10-CM

## 2025-03-26 DIAGNOSIS — F90.2 ADHD (ATTENTION DEFICIT HYPERACTIVITY DISORDER), COMBINED TYPE: Primary | ICD-10-CM

## 2025-03-26 RX ORDER — BUPROPION HYDROCHLORIDE 150 MG/1
150 TABLET ORAL EVERY MORNING
Qty: 90 TABLET | Refills: 1 | Status: CANCELLED | OUTPATIENT
Start: 2025-03-26

## 2025-03-26 RX ORDER — DEXTROAMPHETAMINE SACCHARATE, AMPHETAMINE ASPARTATE MONOHYDRATE, DEXTROAMPHETAMINE SULFATE AND AMPHETAMINE SULFATE 1.25; 1.25; 1.25; 1.25 MG/1; MG/1; MG/1; MG/1
5 CAPSULE, EXTENDED RELEASE ORAL DAILY
Qty: 30 CAPSULE | Refills: 0 | Status: SHIPPED | OUTPATIENT
Start: 2025-04-25 | End: 2025-05-25

## 2025-03-26 RX ORDER — DEXTROAMPHETAMINE SACCHARATE, AMPHETAMINE ASPARTATE MONOHYDRATE, DEXTROAMPHETAMINE SULFATE AND AMPHETAMINE SULFATE 1.25; 1.25; 1.25; 1.25 MG/1; MG/1; MG/1; MG/1
5 CAPSULE, EXTENDED RELEASE ORAL DAILY
Qty: 30 CAPSULE | Refills: 0 | Status: SHIPPED | OUTPATIENT
Start: 2025-03-26 | End: 2025-04-25

## 2025-03-26 RX ORDER — BUPROPION HYDROCHLORIDE 300 MG/1
300 TABLET ORAL EVERY MORNING
Qty: 90 TABLET | Refills: 1 | Status: SHIPPED | OUTPATIENT
Start: 2025-03-26

## 2025-03-26 RX ORDER — BUPROPION HYDROCHLORIDE 150 MG/1
150 TABLET ORAL EVERY MORNING
Qty: 90 TABLET | Refills: 1 | Status: SHIPPED | OUTPATIENT
Start: 2025-03-26

## 2025-03-26 ASSESSMENT — PATIENT HEALTH QUESTIONNAIRE - PHQ9
10. IF YOU CHECKED OFF ANY PROBLEMS, HOW DIFFICULT HAVE THESE PROBLEMS MADE IT FOR YOU TO DO YOUR WORK, TAKE CARE OF THINGS AT HOME, OR GET ALONG WITH OTHER PEOPLE: NOT DIFFICULT AT ALL
SUM OF ALL RESPONSES TO PHQ QUESTIONS 1-9: 5
SUM OF ALL RESPONSES TO PHQ QUESTIONS 1-9: 5

## 2025-03-26 ASSESSMENT — PAIN SCALES - GENERAL: PAINLEVEL_OUTOF10: NO PAIN (0)

## 2025-03-26 NOTE — PATIENT INSTRUCTIONS
-Start Adderall XR 5 mg in AM for ADHD. Monitor changes in anxiety, appetite and sleep.  -Continue all other medication regimen.    Your next appointment is scheduled on 5/6/2025 (Wed) at 3:30pm.    **For crisis resources, please see the information at the end of this document**   Patient Education    Thank you for coming to the [facility] [place] [facility].     Lab Testing:  If you had lab testing today and your results are reassuring or normal they will be mailed to you or sent through [employer] within 7 days. If the lab tests need quick action we will call you with the results. The phone number we will call with results is # [phone]. If this is not the best number please call our clinic and change the number.     Medication Refills:  If you need any refills please call your pharmacy and they will contact us. Our fax number for refills is [phone].   Three business days of notice are needed for general medication refill requests.   Five business days of notice are needed for controlled substance refill requests.   If you need to change to a different pharmacy, please contact the new pharmacy directly. The new pharmacy will help you get your medications transferred.     Contact Us:  Please call [phone] during business hours (8-5:00 M-F).   If you have medication related questions after clinic hours, or on the weekend, please call [phone].     Financial Assistance [phone]   Medical Records [phone]       MENTAL HEALTH CRISIS RESOURCES:  For a emergency help, please call 911 or go to the nearest Emergency Department.     Emergency Walk-In Options:   EmPATH Unit @ [place] [facility] ([place]): [phone] - Specialized mental health emergency area designed to be calming  [facility] ([place]): [phone]  [facility] ([place]): [phone]  [facility] ([place]): [phone]    [facility] Crisis  Information:   [place]: [phone]  [place]: [phone]  [place] ([place]) - Adult: [phone]     Child: [phone]  [place] - Adult: [phone]     Child: [phone]  [place]: [phone]  List of all [facility] resources:   https://[place].gov/dhs/people-we-serve/adults/health-care/mental-health/resources/crisis-contacts.jsp    National Crisis Information:   Crisis Text Line: Text  [place]  to 579479  Suicide & Crisis Lifeline: 988  National Suicide Prevention Lifeline: 0-113-473-TALK (1-397.925.7104)       For online chat options, visit https://suicidepreventionlifeline.org/chat/  Poison Control Center: 1-564.613.3767  Trans Lifeline: 1-291.188.4569 - Hotline for transgender people of all ages  The [employer] Project: 4-124-429-9967 - Hotline for LGBT youth     For Non-Emergency Support:   Fast Tracker: Mental Health & Substance Use Disorder Resources -   https://www.[employer].org/

## 2025-03-26 NOTE — NURSING NOTE
Current patient location:  [place]    Is the patient currently in the state [facility]? YES    Visit mode: VIDEO    If the visit is dropped, the patient can be reconnected by:VIDEO VISIT: Send to e-mail at: [email or web address]    Will anyone else be joining the visit? NO  (If patient encounters technical issues they should call [phone]    Are changes needed to the allergy or medication list? No    Are refills needed on medications prescribed by this physician? NO    Rooming Documentation:  Patient will complete questionnaire(s) in [facility]    Reason for visit: THEODORE DAVISF

## 2025-03-26 NOTE — PROGRESS NOTES
Virtual Visit Details    Type of service:  Video Visit     Originating Location (pt. Location): Other work  Distant Location (provider location):  Off-site  Platform used for Video Visit: [facility]      Psychiatry Clinic Progress Note                                                                  Patient Name: Mallory Barajas  YOB: 1990  MRN: [id number]  Date of Service:  03/26/2025  Last Seen: 2/19/2025    Mallory Barajas is a 35 year old nonbinary adult, assigned female at birth who uses the name Mallory and pronoun she, they. Pt wants they pronoun used today for charting purpose.     Mallory Barajas is a 35 year old adult who presents for ongoing psychiatric care.  Mallory was last seen in clinic on 2/19/2025.     At that time,     Medication Ordered/Consults/Labs/tests Ordered:     Medication:   -Increase Wellbutrin XL to 450 mg daily for mood. Monitor for changes in appetite, irritability, anxiety and sleep  -Continue on current Zoloft regimen.  OTC Recommendations: none  Lab Orders:  none  Referrals: none  Release of Information: none  Future Treatment Considerations: Per symptoms. Gabapentin if alcohol craving in the future.  Return for Follow Up: in 4 weeks    Pertinent Background:  Depression started around 18 in college. Passive SI also started during college, no hx of SA. Cutting started in college, last cutting in 2015. No psych hospitalization. Anxiety started around high school. Medical complication includes DM I.     Previous medication trials: Prozac (ineffective, early 20's)    Therapist: Shannan Chaudhary (weekly)     Interim History                                                                                                        4, 4     Since the last visit,  -reports feeling better. Increase in wellbutrin improved mood without exacerbating anxiety or irritability.  -Last 2 days, went back to Wellbutrin  mg daily as she has not picked up 150 mg refill. But plan to go back to 450 mg daily as soon  as able.  -Sports leagues started and this is also helping, but make it difficult to  the mediations before pharmacy closes.  -Has not noted significant improvement in concentration with Wellbutrin increase.  -Drinking has been well managed. 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 Able to set limits on drinking.  -BG has been fairly stable.  -Anxiety is well managed.    Denies any symptoms suggestive of hypomania or psychosis.    Current Suicidality/Hx of Suicide Attempts: Denies both  CoCominent Medical concerns: DM I.    Medication Side Effects: The patient denies all medication side effects.      Medical Review of Systems     Apart from the symptoms mentioned int he HPI, the 14 point review of systems, including constitutional, HEENT, cardiovascular, respiratory, gastrointestinal, genitourinary, musculoskeletal, integumentary, endocrine, neurological, hematologic and allergic is entirely negative.    Pregnant: None. Nursing: None, Contraception: partner not sperm producing.     Substance Use     -See HPI.  -Reports having 2 weeks stretch of >5 drinks x3/week, but not drinking for 3-4 months at a time.  Goes to bed 9:30pm, falls asleep within 30 min, wakes up 4:45am. Sleep throughout the night mostly. May wake up x1 for bathroom, but able to go back to sleep easily. Feels rested in AM. Hx of DWI in 2018 in [place]. One black out at home.  -Cannabis: gummies 5 mg every 4 months.  -Denies any other substance use.    Social/ Family History                                  [per patient report]                                 1ea,1ea     -Living arrangements: lives with spouse and feels safe.   -Social Support: spouse, parents, siblings, close friends. Parents, sister, some friends in [place], otherwise they are all in [place].   -Access to gun: Denies  -Denies trauma history.   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  This year, teaching summer school.      Allergy                                Patient has no known allergies.    Current Medications                                                                                                       Current Outpatient Medications   Medication Sig Dispense Refill    acetone urine (KETOSTIX) test strip Use 1 strip to check urine ketones in the event of unexplained high blood glucose, or in the case of illness.90 day supply 50 strips 50 strip 11    blood glucose (ACCU-CHEK MACARIO PLUS) test strip USE TO TEST BLOOD SUGARS SIX TIMES DAILY OR AS DIRECTED 600 each 3    blood glucose monitoring (ACCU-CHEK MACARIO PLUS) meter device kit Use to test blood sugar 6 times daily or as directed. 1 kit 0    blood glucose monitoring (ULTRA THIN 30G) lancets Use to test blood sugar 6 times daily or as directed.whatever is covered 540 each 3    buPROPion (WELLBUTRIN XL) 150 MG 24 hr tablet Take 1 tablet (150 mg) by mouth every morning. Together with one 300 mg tablet to make total of 450 mg daily 30 tablet 1    buPROPion (WELLBUTRIN XL) 300 MG 24 hr tablet Take 1 tablet (300 mg) by mouth every morning. 90 tablet 1    Continuous Blood Gluc  (DEXCOM G6 ) GHADA Use to read blood sugars as per 's instructions. 1 each 0    Continuous Glucose Sensor (DEXCOM G6 SENSOR) MISC CHANGE SENSOR EVERY 10 DAYS 9 each 3    Continuous Glucose Transmitter (DEXCOM G6 TRANSMITTER) MISC CHANGE TRANSMITTER EVERY 3 MONTHS 1 each 1    Glucagon (BAQSIMI TWO PACK) 3 MG/DOSE nasal powder Spray 1 spray (3 mg) in nostril as needed (severe hypoglycemia) 2 each 3    Insulin Disposable Pump (OMNIPOD 5 G6 INTRO, GEN 5,) KIT 1 kit as needed 1 kit 0    Insulin Disposable Pump (OMNIPOD 5 G6 PODS, GEN 5,) MISC 1 pod every 3 days 30 each 4    insulin glargine (LANTUS VIAL) 100 UNIT/ML vial Inject 6 units subcutaneous daily ONLY IF INSULIN PUMP FAILS. 10 mL 1    insulin lispro (HUMALOG) 100 UNIT/ML Cartridge Use  "in pump aprox 50 units daily 50 mL 3    insulin pen needle 30G X 5 MM Use 4 pen needles daily or as directed. 120 each 12    insulin syringes, disposable, U-100 0.3 ML MISC Use for insulin ONLY IF INSULIN PUMP FAILS. 50 each 1    sertraline (ZOLOFT) 100 MG tablet Take 1.5 tablets (150 mg) by mouth daily. 135 tablet 1    valACYclovir (VALTREX) 1000 mg tablet Take 2 tablets (2,000 mg) by mouth 2 times daily for 1 day. 4 tablet 0    Wound Dressing Adhesive (MASTISOL ADHESIVE) LIQD Use for securing sensor 15 mL 1         Vitals                                                                                                                       3, 3   There were no vitals taken for this visit.        Mental Status Exam                                                                                   9, 14 cog      Alertness: alert  and oriented  Appearance:  Casually dressed and Adequately groomed  Behavior/Demeanor: cooperative, pleasant, and calm, with good  eye contact   Speech: regular rate and rhythm  Mood :  \"good\"  Affect:  mostly euthymic ; was congruent to mood; was congruent to content  Thought Process (Associations):  Linear, and Goal directed  Thought process (Rate):  Normal  Thought content:  no overt psychosis, denies suicidal ideation, intent or thoughts, and patient does not appear to be responding to internal stimuli  Perception:  Reports none;  Denies auditory hallucinations and visual hallucinations  Attention/Concentration:  Normal  Memory:  Immediate recall intact and Short-term memory intact  Language: intact  Fund of Knowledge/Intelligence:  Average  Abstraction:  Normal  Insight:  Good  Judgment:  Good  Cognition: (6) does  appear grossly intact; formal cognitive testing was not done    Physical Exam     Motor activity/EPS:  Normal  Psychomotor: normal or unremarkable    Labs and Results      Pertinent findings on review include: Review of records  with relevant information reported in the HPI.  " Reviewed pt's past medical record and obtained collateral information.      [place] PRESCRIPTION MONITORING PROGRAM [] was checked today:  not using controlled substances.        6/26/2024     7:57 AM 1/1/2025    10:07 AM 2/18/2025     5:35 PM   PHQ   PHQ-9 Total Score 10 6  11    Q9: Thoughts of better off dead/self-harm past 2 weeks Several days Not at all Several days   F/U: Thoughts of suicide or self-harm No  No   F/U: Safety concerns No  No       Patient-reported       RIANNA 7 Today: N/A      5/15/2024     5:05 PM 2/18/2025     5:36 PM   RIANNA-7 SCORE   Total Score 15 (severe anxiety) 9 (mild anxiety)   Total Score 15 9        Patient-reported       Recent Labs   Lab Test 01/02/25  1016 11/27/24  1459 02/20/24  0752   CR 1.11* 0.93 0.82   GFRESTIMATED 67 82 >90     Recent Labs   Lab Test 11/27/24  1459 01/24/19  0821   AST 24 10   ALT  --  13     PSYCHOTROPIC DRUG INTERACTIONS:    Wellbutrin---Zoloft: Concurrent use of BUPROPION and SEIZURE THRESHOLD LOWERING AGENTS may result in increased risk of seizures.   Adderall---Wellbutrin---Zoloft: Concurrent use of AMPHETAMINES and CYP2D6 INHIBITORS may result in increased amphetamine exposure and increased risk of serotonin syndrome.   MANAGEMENT:  routine monitoring and pt is aware of the risks.    Impression/Assessment      Mallory Barajas is a 35 year old adult  who presents for med management follow up.  Pt appears mostly stable in their mood and anxiety, denies SI, SIB or HI during the appointment. Pt noted improvement of mood with increased Wellbutrin without any ADR. However, ADHD is not optimally managed with increase in wellbutrin. Discussed ADHD pharmacological intervention. Most recent /62 on 2/14/2025 and 118/72 on 1/2/2025. Likely may not tolerate high dose of Guanfacine or clonidine. Pt decided to try Adderall XR 5 mg daily while monitoring changes in anxiety, irritability, appetite and sleep. Will continue all other medication regimen for now.  Reiterated  risk of seizure with the maximum dose of Wellbutrin and alcohol use. Pt is able to set limits and adhere to limits on drinking.    Diagnosis                                                                    MDD  RIANNA  ADHD combined type  R/out binge drinking    Treatment Recommendation & Plan       Medication Ordered/Consults/Labs/tests Ordered:     Medication:   -Start Adderall XR 5 mg in AM for ADHD. Monitor changes in anxiety, appetite and sleep.  -Continue all other medication regimen.  OTC Recommendations: none  Lab Orders:  none  Referrals: none  Release of Information: none  Future Treatment Considerations: Per symptoms. Gabapentin if alcohol craving in the future.  Return for Follow Up: in 6 weeks due to schedule conflicts    -Discussed safety plan for suicidal thoughts  -Discussed plan for suicidality  -Discussed available emergency services  -Patient agrees with the treatment plan  -Encouraged to continue outpatient therapy to gain more coping mechanism for stress.    Treatment Risk Statement: Discussed with the patient my impressions, as well as recommended studies. I educated patient on the differential diagnosis and prognosis. I discussed with the patient the risks and benefits of medications versus no interventions, including efficacy, dose, possible side effects and length of treatment and the importance of medication compliance.  The patient understands the risks, benefits, adverse effects and alternatives. Agrees to treatment with the capacity to do so. No medical contraindications to treatment. The patient also understands the risks of using street drugs or alcohol.     CRISIS NUMBERS:   Provided routinely in AVS.      Diagnosis or treatment significantly limited by social determinants of health.    The longitudinal plan of care for the diagnosis(es)/condition(s) as documented were addressed during this visit. Due to the added complexity in care, I will continue to support Mallory in the subsequent  management and with ongoing continuity of care.        Coral Adam, RANJEET,  03/26/2025

## 2025-03-27 NOTE — TELEPHONE ENCOUNTER
Pt saw provider yesterday (03/26/2025) and provider sent a 90 day supply plus 1 refill to the requested pharmacy. Writer sent pt a [facility] message letting them know that provider had done that and to please reach out if pt was having any issues with getting the medication.     - Devonte Allison, Visit Facilitator

## 2025-04-01 DIAGNOSIS — E10.9 TYPE 1 DIABETES MELLITUS WITHOUT COMPLICATION (H): ICD-10-CM

## 2025-04-01 RX ORDER — INSULIN LISPRO 100 [IU]/ML
INJECTION, SOLUTION INTRAVENOUS; SUBCUTANEOUS
Qty: 50 ML | Refills: 3 | Status: SHIPPED | OUTPATIENT
Start: 2025-04-01

## 2025-04-01 NOTE — TELEPHONE ENCOUNTER
insulin lispro (HUMALOG) 100 UNIT/ML Cartridge         Last Written Prescription Date:  3/17/25  Last Fill Quantity: 50 ml,   # refills: 3  Last Office Visit : 11/13/24  Future Office visit:  4/22/25    Routing refill request to provider for review/approval because:  Insulin and insulin pump supplies - refilled per Endocrine clinic.

## 2025-04-21 NOTE — PROGRESS NOTES
Mallory Barajas is a 35 year old female with type 1 diabetes mellitus.  Pt was dx having type 1 diabetes mellitus in May 2015.  She is Co.-managed with Sara Larry    Mallory is currently taking using a Tandem insulin pump- control IQ and DexcomG6 sensor.  Her Dexcom data are below.  She is aware that she is having a lot of low blood sugars, particularly after she boluses.  She will recognize her sugars when they get into the 50s but also relies on the alarm.  She has not needed to use glucagon recently.  When she gets low, she will have her wife go get some juice boxes.  She continues to be very active.  During the winter months she plays basketball and during the summer months she plays kickball and soccer.  She usually puts her pump into exercise mode up to an hour before she does this.  Generally it works very well.    She saw her eye doctor in November 2024.  She had no retinopathy.  She denies any paresthesias.  She has no chest pain or shortness of breath.  Menstrual cycle is regular.  She has no GI or  symptoms.                                  Current Outpatient Medications   Medication Sig Dispense Refill    acetone urine (KETOSTIX) test strip Use 1 strip to check urine ketones in the event of unexplained high blood glucose, or in the case of illness.90 day supply 50 strips 50 strip 11    amphetamine-dextroamphetamine (ADDERALL XR) 5 MG 24 hr capsule Take 1 capsule (5 mg) by mouth daily. 30 capsule 0    blood glucose (ACCU-CHEK MACARIO PLUS) test strip USE TO TEST BLOOD SUGARS SIX TIMES DAILY OR AS DIRECTED 600 each 3    blood glucose monitoring (ACCU-CHEK MACARIO PLUS) meter device kit Use to test blood sugar 6 times daily or as directed. 1 kit 0    blood glucose monitoring (ULTRA THIN 30G) lancets Use to test blood sugar 6 times daily or as directed.whatever is covered 540 each 3    buPROPion (WELLBUTRIN XL) 150 MG 24 hr tablet Take 1 tablet (150 mg) by mouth every morning. Together with one 300 mg tablet to  "make total of 450 mg daily 90 tablet 1    buPROPion (WELLBUTRIN XL) 300 MG 24 hr tablet Take 1 tablet (300 mg) by mouth every morning. 90 tablet 1    Continuous Blood Gluc  (DEXCOM G6 ) GHADA Use to read blood sugars as per 's instructions. 1 each 0    Continuous Glucose Sensor (DEXCOM G6 SENSOR) MISC CHANGE SENSOR EVERY 10 DAYS 9 each 3    Continuous Glucose Transmitter (DEXCOM G6 TRANSMITTER) MISC CHANGE TRANSMITTER EVERY 3 MONTHS 1 each 1    Glucagon (BAQSIMI TWO PACK) 3 MG/DOSE nasal powder Spray 1 spray (3 mg) in nostril as needed (severe hypoglycemia) 2 each 3    Insulin Disposable Pump (OMNIPOD 5 G6 INTRO, GEN 5,) KIT 1 kit as needed 1 kit 0    Insulin Disposable Pump (OMNIPOD 5 G6 PODS, GEN 5,) MISC 1 pod every 3 days 30 each 4    insulin glargine (LANTUS VIAL) 100 UNIT/ML vial Inject 6 units subcutaneous daily ONLY IF INSULIN PUMP FAILS. 10 mL 1    insulin lispro (HUMALOG) 100 UNIT/ML Cartridge Use in pump aprox 50 units daily 50 mL 3    insulin pen needle 30G X 5 MM Use 4 pen needles daily or as directed. 120 each 12    insulin syringes, disposable, U-100 0.3 ML MISC Use for insulin ONLY IF INSULIN PUMP FAILS. 50 each 1    sertraline (ZOLOFT) 100 MG tablet Take 1.5 tablets (150 mg) by mouth daily. 135 tablet 1    Wound Dressing Adhesive (MASTISOL ADHESIVE) LIQD Use for securing sensor 15 mL 1    valACYclovir (VALTREX) 1000 mg tablet Take 2 tablets (2,000 mg) by mouth 2 times daily for 1 day. 4 tablet 0     No current facility-administered medications for this visit.       /89   Pulse 78   Ht 1.753 m (5' 9\")   Wt 83 kg (183 lb)   SpO2 98%   BMI 27.02 kg/m      VSS  NAD  Eyes - no periorbital edema, conjunctival injection, scleral icterus  Neck - no thyromegaly  CV - RRR.  Normal pulses in feet.  No edema  Neuro - sensation intact to monofilament on soles of feet.  DTR 2/4 biceps  Skin - normal texture   Feet - no ulcers   Recent Labs   Lab Test 04/22/25  1533 " 01/02/25  1016 11/27/24  1504 11/27/24  1459 02/20/24  0752 03/28/23  1617 09/27/22  1641 09/27/22  1632 09/27/22  1537 01/24/19  0831 01/24/19  0821   A1C 6.4*  --   --  6.5* 6.4*  --    < >  --   --   --   --    HEMOGLOBINA1  --   --   --   --   --  6.4*  --   --  6.1*   < >  --    TSH  --   --   --  1.16  --   --   --   --   --   --  0.96   LDL  --   --   --  77  --   --   --   --   --   --  67   HDL  --   --   --  89  --   --   --   --   --   --  78   TRIG  --   --   --  82  --   --   --   --   --   --  36   CR  --  1.11*  --  0.93 0.82  --   --    < >  --    < > 0.85   MICROL  --   --  <12.0  --  <12.0  --   --    < >  --    < >  --     < > = values in this interval not displayed.     Assessment and plan:    1.  Diabetes control.  Mallory is having too much hypoglycemia.  Fortunately she has not had severe hypoglycemia.  She does have glucagon at home.  We changed her carb ratio to 1 unit for 18 g around-the-clock.  We also changed her active insulin to 4 hours.  This should help minimize the hypoglycemia.  We once again reviewed how to manage hypoglycemia during exercise.    2.  Diabetes complications.  She is up-to-date with her screenings and has none.  I do note that her creatinine went up the last time it was measured.  I will repeat it today to make certain it is normalized.    3.CVD risk.  Her blood pressure is fine.  She is not interested in taking a statin.  Her LDL is less than 100.    Follow-up in 6 months with Pricila Larry in 12 months with me.    The time I spent with the patient and doing the documentation was exclusive of the time I spent reviewing her continuous glucose monitor.  That interpretation is above.    Josiane Cantrell MD

## 2025-04-22 ENCOUNTER — LAB (OUTPATIENT)
Dept: LAB | Facility: CLINIC | Age: 35
End: 2025-04-22
Payer: COMMERCIAL

## 2025-04-22 ENCOUNTER — OFFICE VISIT (OUTPATIENT)
Dept: ENDOCRINOLOGY | Facility: CLINIC | Age: 35
End: 2025-04-22
Payer: COMMERCIAL

## 2025-04-22 VITALS
SYSTOLIC BLOOD PRESSURE: 132 MMHG | WEIGHT: 183 LBS | BODY MASS INDEX: 27.11 KG/M2 | OXYGEN SATURATION: 98 % | DIASTOLIC BLOOD PRESSURE: 89 MMHG | HEIGHT: 69 IN | HEART RATE: 78 BPM

## 2025-04-22 DIAGNOSIS — E10.9 TYPE 1 DIABETES MELLITUS WITHOUT COMPLICATION (H): Primary | ICD-10-CM

## 2025-04-22 DIAGNOSIS — E10.9 TYPE 1 DIABETES MELLITUS WITHOUT COMPLICATION (H): ICD-10-CM

## 2025-04-22 DIAGNOSIS — R79.89 ELEVATED SERUM CREATININE: ICD-10-CM

## 2025-04-22 LAB
ANION GAP SERPL CALCULATED.3IONS-SCNC: 10 MMOL/L (ref 7–15)
BUN SERPL-MCNC: 14.6 MG/DL (ref 6–20)
CALCIUM SERPL-MCNC: 9.4 MG/DL (ref 8.8–10.4)
CHLORIDE SERPL-SCNC: 101 MMOL/L (ref 98–107)
CREAT SERPL-MCNC: 0.89 MG/DL (ref 0.51–0.95)
EGFRCR SERPLBLD CKD-EPI 2021: 86 ML/MIN/1.73M2
EST. AVERAGE GLUCOSE BLD GHB EST-MCNC: 137 MG/DL
GLUCOSE SERPL-MCNC: 109 MG/DL (ref 70–99)
HBA1C MFR BLD: 6.4 %
HCO3 SERPL-SCNC: 27 MMOL/L (ref 22–29)
POTASSIUM SERPL-SCNC: 3.9 MMOL/L (ref 3.4–5.3)
SODIUM SERPL-SCNC: 138 MMOL/L (ref 135–145)

## 2025-04-22 PROCEDURE — 36415 COLL VENOUS BLD VENIPUNCTURE: CPT | Performed by: PATHOLOGY

## 2025-04-22 PROCEDURE — 82043 UR ALBUMIN QUANTITATIVE: CPT | Performed by: INTERNAL MEDICINE

## 2025-04-22 PROCEDURE — 80048 BASIC METABOLIC PNL TOTAL CA: CPT | Performed by: PATHOLOGY

## 2025-04-22 PROCEDURE — 99000 SPECIMEN HANDLING OFFICE-LAB: CPT | Performed by: PATHOLOGY

## 2025-04-22 ASSESSMENT — PATIENT HEALTH QUESTIONNAIRE - PHQ9: SUM OF ALL RESPONSES TO PHQ QUESTIONS 1-9: 2

## 2025-04-22 ASSESSMENT — PAIN SCALES - GENERAL: PAINLEVEL_OUTOF10: NO PAIN (0)

## 2025-04-22 NOTE — PATIENT INSTRUCTIONS
We changed your active insulin time to 18 and your carb ratio to 1 unit for 18 g today.      Labs today.    Put your pump into exercise mode with an exercise target 1 hour before activity

## 2025-04-22 NOTE — LETTER
4/22/2025       RE: Mallory Barajas  [address] [facility] [address]     Dear Colleague,    Thank you for referring your patient, Mallory Barajas, to the [facility] [place] at [facility]. Please see a copy of my visit note below.    03/31/25 2:23 PM  PATIENT LAB/IMAGING STATUS : No pending lab orders         Mallory Barajas is a 35 year old female with type 1 diabetes mellitus.  Pt was dx having type 1 diabetes mellitus in May 2015.  She is Co.-managed with Sara Larry    Mallory is currently taking using a Tandem insulin pump- control IQ and DexcomG6 sensor.  Her Dexcom data are below.  She is aware that she is having a lot of low blood sugars, particularly after she boluses.  She will recognize her sugars when they get into the 50s but also relies on the alarm.  She has not needed to use glucagon recently.  When she gets low, she will have her wife go get some juice boxes.  She continues to be very active.  During the winter months she plays basketball and during the summer months she plays kickball and soccer.  She usually puts her pump into exercise mode up to an hour before she does this.  Generally it works very well.    She saw her eye doctor in November 2024.  She had no retinopathy.  She denies any paresthesias.  She has no chest pain or shortness of breath.  Menstrual cycle is regular.  She has no GI or  symptoms.                                  Current Outpatient Medications   Medication Sig Dispense Refill     acetone urine (KETOSTIX) test strip Use 1 strip to check urine ketones in the event of unexplained high blood glucose, or in the case of illness.90 day supply 50 strips 50 strip 11     amphetamine-dextroamphetamine (ADDERALL XR) 5 MG 24 hr capsule Take 1 capsule (5 mg) by mouth daily. 30 capsule 0     blood glucose (ACCU-CHEK MACARIO PLUS) test strip USE TO TEST BLOOD SUGARS SIX TIMES DAILY OR AS DIRECTED 600 each 3     blood  glucose monitoring (ACCU-CHEK MACARIO PLUS) meter device kit Use to test blood sugar 6 times daily or as directed. 1 kit 0     blood glucose monitoring (ULTRA THIN 30G) lancets Use to test blood sugar 6 times daily or as directed.whatever is covered 540 each 3     buPROPion (WELLBUTRIN XL) 150 MG 24 hr tablet Take 1 tablet (150 mg) by mouth every morning. Together with one 300 mg tablet to make total of 450 mg daily 90 tablet 1     buPROPion (WELLBUTRIN XL) 300 MG 24 hr tablet Take 1 tablet (300 mg) by mouth every morning. 90 tablet 1     Continuous Blood Gluc  (DEXCOM G6 ) GHADA Use to read blood sugars as per 's instructions. 1 each 0     Continuous Glucose Sensor (DEXCOM G6 SENSOR) MISC CHANGE SENSOR EVERY 10 DAYS 9 each 3     Continuous Glucose Transmitter (DEXCOM G6 TRANSMITTER) MISC CHANGE TRANSMITTER EVERY 3 MONTHS 1 each 1     Glucagon (BAQSIMI TWO PACK) 3 MG/DOSE nasal powder Spray 1 spray (3 mg) in nostril as needed (severe hypoglycemia) 2 each 3     Insulin Disposable Pump (OMNIPOD 5 G6 INTRO, GEN 5,) KIT 1 kit as needed 1 kit 0     Insulin Disposable Pump (OMNIPOD 5 G6 PODS, GEN 5,) MISC 1 pod every 3 days 30 each 4     insulin glargine (LANTUS VIAL) 100 UNIT/ML vial Inject 6 units subcutaneous daily ONLY IF INSULIN PUMP FAILS. 10 mL 1     insulin lispro (HUMALOG) 100 UNIT/ML Cartridge Use in pump aprox 50 units daily 50 mL 3     insulin pen needle 30G X 5 MM Use 4 pen needles daily or as directed. 120 each 12     insulin syringes, disposable, U-100 0.3 ML MISC Use for insulin ONLY IF INSULIN PUMP FAILS. 50 each 1     sertraline (ZOLOFT) 100 MG tablet Take 1.5 tablets (150 mg) by mouth daily. 135 tablet 1     Wound Dressing Adhesive (MASTISOL ADHESIVE) LIQD Use for securing sensor 15 mL 1     valACYclovir (VALTREX) 1000 mg tablet Take 2 tablets (2,000 mg) by mouth 2 times daily for 1 day. 4 tablet 0     No current facility-administered medications for this visit.       /89   " Pulse 78   Ht 1.753 m (5' 9\")   Wt 83 kg (183 lb)   SpO2 98%   BMI 27.02 kg/m      VSS  NAD  Eyes - no periorbital edema, conjunctival injection, scleral icterus  Neck - no thyromegaly  CV - RRR.  Normal pulses in feet.  No edema  Neuro - sensation intact to monofilament on soles of feet.  DTR 2/4 biceps  Skin - normal texture   Feet - no ulcers   Recent Labs   Lab Test 04/22/25  1533 01/02/25  1016 11/27/24  1504 11/27/24  1459 02/20/24  0752 03/28/23  1617 09/27/22  1641 09/27/22  1632 09/27/22  1537 01/24/19  0831 01/24/19  0821   A1C 6.4*  --   --  6.5* 6.4*  --    < >  --   --   --   --    HEMOGLOBINA1  --   --   --   --   --  6.4*  --   --  6.1*   < >  --    TSH  --   --   --  1.16  --   --   --   --   --   --  0.96   LDL  --   --   --  77  --   --   --   --   --   --  67   HDL  --   --   --  89  --   --   --   --   --   --  78   TRIG  --   --   --  82  --   --   --   --   --   --  36   CR  --  1.11*  --  0.93 0.82  --   --    < >  --    < > 0.85   MICROL  --   --  <12.0  --  <12.0  --   --    < >  --    < >  --     < > = values in this interval not displayed.     Assessment and plan:    1.  Diabetes control.  Mallory is having too much hypoglycemia.  Fortunately she has not had severe hypoglycemia.  She does have glucagon at home.  We changed her carb ratio to 1 unit for 18 g around-the-clock.  We also changed her active insulin to 4 hours.  This should help minimize the hypoglycemia.  We once again reviewed how to manage hypoglycemia during exercise.    2.  Diabetes complications.  She is up-to-date with her screenings and has none.  I do note that her creatinine went up the last time it was measured.  I will repeat it today to make certain it is normalized.    3.CVD risk.  Her blood pressure is fine.  She is not interested in taking a statin.  Her LDL is less than 100.    Follow-up in 6 months with Pricila Larry in 12 months with me.    The time I spent with the patient and doing the documentation was " exclusive of the time I spent reviewing her continuous glucose monitor.  That interpretation is above.    Josiane Cantrell MD        Again, thank you for allowing me to participate in the care of your patient.      Sincerely,    Josiane Cantrell MD

## 2025-04-23 DIAGNOSIS — F90.2 ADHD (ATTENTION DEFICIT HYPERACTIVITY DISORDER), COMBINED TYPE: Primary | ICD-10-CM

## 2025-04-23 LAB
CREAT UR-MCNC: 27.9 MG/DL
MICROALBUMIN UR-MCNC: <12 MG/L
MICROALBUMIN/CREAT UR: NORMAL MG/G{CREAT}

## 2025-04-23 RX ORDER — METHYLPHENIDATE HYDROCHLORIDE 18 MG/1
18 TABLET ORAL EVERY MORNING
Qty: 30 TABLET | Refills: 0 | Status: SHIPPED | OUTPATIENT
Start: 2025-04-23

## 2025-05-06 ENCOUNTER — MYC MEDICAL ADVICE (OUTPATIENT)
Dept: INTERNAL MEDICINE | Facility: CLINIC | Age: 35
End: 2025-05-06
Payer: COMMERCIAL

## 2025-05-06 ENCOUNTER — VIRTUAL VISIT (OUTPATIENT)
Dept: PSYCHIATRY | Facility: CLINIC | Age: 35
End: 2025-05-06
Attending: NURSE PRACTITIONER
Payer: COMMERCIAL

## 2025-05-06 DIAGNOSIS — F90.2 ADHD (ATTENTION DEFICIT HYPERACTIVITY DISORDER), COMBINED TYPE: Primary | ICD-10-CM

## 2025-05-06 RX ORDER — LISDEXAMFETAMINE DIMESYLATE 10 MG/1
10 TABLET, CHEWABLE ORAL EVERY MORNING
Qty: 30 TABLET | Refills: 0 | Status: SHIPPED | OUTPATIENT
Start: 2025-05-06

## 2025-05-06 NOTE — PATIENT INSTRUCTIONS
-Start Vyvance 10 mg daily for ADHD. Monitor changes in anxiety, appetite and sleep as well as fatigue.  -Concerta is discontinued.  -Continue all other medication regimen.    Your next appointment is scheduled on 6/11/2025 (Wed) at 3:30pm.    Thank you for coming to the [facility] [place] [facility].     Lab Testing:  If you had lab testing today and your results are reassuring or normal they will be mailed to you or sent through MAINtag within 7 days. If the lab tests need quick action we will call you with the results. The phone number we will call with results is # [phone]. If this is not the best number please call our clinic and change the number.     Medication Refills:  If you need any refills please call your pharmacy and they will contact us. Our fax number for refills is [phone].   Three business days of notice are needed for general medication refill requests.   Five business days of notice are needed for controlled substance refill requests.   If you need to change to a different pharmacy, please contact the new pharmacy directly. The new pharmacy will help you get your medications transferred.     Contact Us:  Please call [phone] during business hours (8-5:00 M-F).   If you have medication related questions after clinic hours, or on the weekend, please call [phone].     Financial Assistance [phone]   Medical Records [phone]       MENTAL HEALTH CRISIS RESOURCES:  For a emergency help, please call 911 or go to the nearest Emergency Department.     Emergency Walk-In Options:   EmPATH Unit @ [facility] ([place]): [phone] - Specialized mental health emergency area designed to be calming  [facility] ([place]): [phone]  [facility] ([place]): [phone]  [facility]): [phone]    [facility] Crisis Information:   [place]: [phone]  [place]:  [phone]  [place] (COPE) - Adult: [phone]     Child: [phone]  [place] - Adult: [phone]     Child: [phone]  [place]: [phone]  List of all [facility] resources:   https://[place].[email or web address]/dhs/people-we-serve/adults/health-care/mental-health/resources/crisis-contacts.jsp    Warm Lines that DO NOT Call the Police    -Call Blackline: 749.847.8337. Centers BIPOC, LGBTQ and Black Femme lens  -Trans Lifeline: 698.702.4502 ([place]). 251.945.1347 ([place]). Run by and four Trans people.  -[employer] [place] Peer Support Line: 874.658.3831. Trained peer supporters.  -Strong Hearts Native Helpline: 916.510.2798. Centering Native Americans and [place] Natives.  -Thrive Lifeline: 108.661.8235. Trans led and operated.  -LGBT National Help Center: 723.696.9315.    National Crisis Information:   Crisis Text Line: Text  [place]  to 023369  Suicide & Crisis Lifeline: 988  National Suicide Prevention Lifeline: 5-118-915-FLWU (1-566.737.2909)       For online chat options, visit https://suicidepreventionlifeline.org/chat/  Poison Control Center: 2-249-302-0997  Trans Lifeline: 1-159.681.2809 - Hotline for transgender people of all ages  The Jacky Project: 4-153-385-4121 - Hotline for LGBT youth     For Non-Emergency Support:   Fast Tracker: Mental Health & Substance Use Disorder Resources -   https://www.[employer].org/

## 2025-05-06 NOTE — PROGRESS NOTES
Virtual Visit Details    Type of service:  Video Visit     Originating Location (pt. Location): Other work  Distant Location (provider location):  Off-site  Platform used for Video Visit: [facility]      Psychiatry Clinic Progress Note                                                                  Patient Name: Mallory Barajas  YOB: 1990  MRN: [id number]  Date of Service:  05/06/2025  Last Seen: 3/26/2025    Mallory Barajas is a 35 year old nonbinary adult, assigned female at birth who uses the name Mallory and pronoun she, they. Pt wants they pronoun used today for charting purpose.     Mallory Barajas is a 35 year old adult who presents for ongoing psychiatric care.  Mallory was last seen in clinic on 3/26/2025.     At that time,     Medication Ordered/Consults/Labs/tests Ordered:     Medication:   -Start Adderall XR 5 mg in AM for ADHD. Monitor changes in anxiety, appetite and sleep.  -Continue all other medication regimen.  OTC Recommendations: none  Lab Orders:  none  Referrals: none  Release of Information: none  Future Treatment Considerations: Per symptoms. Gabapentin if alcohol craving in the future.  Return for Follow Up: in 6 weeks due to schedule conflicts    Pertinent Background:  Depression started around 18 in college. Passive SI also started during college, no hx of SA. Cutting started in college, last cutting in 2015. No psych hospitalization. Anxiety started around high school. Medical complication includes DM I.     Previous medication trials: Prozac (ineffective, early 20's), Adderall XR (fatigue). Concerta (fatigue)    Therapist: Shannan Chaudhary (weekly)     Interim History                                                                                                        4, 4     On 4/21/2025, pt sent a [facility] message noting more fatigue at the end of the day. Discussed fatigue is not typical reaction of stimulant, but if it's only at the end of the day, this could be due to medication wearing off.  Pt also did not find Adderall helpful. Discussed to switch it to Concerta 18 mg daily and discontinue Adderall.    Since the last visit,  -Unsure if this is related, but since starting Concerta, BG has been more elevated than baseline. No changes in appetite with Concerta.  -Less fatigued than Adderall XR, but still experiencing more than baseline fatigue all day. Feels not alert due to fatigue all day.  -Unsure if felt fatigue while trying Wellbutrin taper in the past.  -Anxiety and mood are still fairly well managed, denies SI, SIB or HI.  -Drinking has been mostly fairly well managed, had more drinks for friend's b'day; 1 tall can of beer and 3 gin tonics. But father has been over to discuss brother's substance use (alcohol and cocaine) and this discussion has them consider reduction of alcohol use.  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    Denies any symptoms suggestive of hypomania or psychosis.    Current Suicidality/Hx of Suicide Attempts: Denies both  CoCominent Medical concerns: DM I.    Medication Side Effects: The patient denies all medication side effects.      Medical Review of Systems     Apart from the symptoms mentioned int he HPI, the 14 point review of systems, including constitutional, HEENT, cardiovascular, respiratory, gastrointestinal, genitourinary, musculoskeletal, integumentary, endocrine, neurological, hematologic and allergic is entirely negative.    Pregnant: None. Nursing: None, Contraception: partner not sperm producing.     Substance Use     -See HPI.  -Reports having 2 weeks stretch of >5 drinks x3/week, but not drinking for 3-4 months at a time.  Goes to bed 9:30pm, falls asleep within 30 min, wakes up 4:45am. Sleep throughout the night mostly. May wake up x1 for bathroom, but able to go back to sleep easily. Feels rested in AM. Hx of DWI in 2018 in [place]. One black out at home.  -Cannabis: gummies 5 mg every 4 months.  -Denies any  other substance use.    Social/ Family History                                  [per patient report]                                 1ea,1ea     -Living arrangements: lives with spouse and feels safe.   -Social Support: spouse, parents, siblings, close friends. Parents, sister, some friends in [place], otherwise they are all in [place].   -Access to gun: Denies  -Denies trauma history.   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 This year, teaching summer school.      Allergy                                Patient has no known allergies.    Current Medications                                                                                                       Current Outpatient Medications   Medication Sig Dispense Refill    acetone urine (KETOSTIX) test strip Use 1 strip to check urine ketones in the event of unexplained high blood glucose, or in the case of illness.90 day supply 50 strips 50 strip 11    blood glucose (ACCU-CHEK MACARIO PLUS) test strip USE TO TEST BLOOD SUGARS SIX TIMES DAILY OR AS DIRECTED 600 each 3    blood glucose monitoring (ACCU-CHEK MACARIO PLUS) meter device kit Use to test blood sugar 6 times daily or as directed. 1 kit 0    blood glucose monitoring (ULTRA THIN 30G) lancets Use to test blood sugar 6 times daily or as directed.whatever is covered 540 each 3    buPROPion (WELLBUTRIN XL) 150 MG 24 hr tablet Take 1 tablet (150 mg) by mouth every morning. Together with one 300 mg tablet to make total of 450 mg daily 90 tablet 1    buPROPion (WELLBUTRIN XL) 300 MG 24 hr tablet Take 1 tablet (300 mg) by mouth every morning. 90 tablet 1    Continuous Blood Gluc  (DEXCOM G6 ) GHADA Use to read blood sugars as per 's instructions. 1 each 0    Continuous Glucose Sensor (DEXCOM G6 SENSOR) MISC CHANGE SENSOR EVERY 10 DAYS 9 each 3    Continuous Glucose Transmitter (DEXCOM G6 TRANSMITTER) MISC CHANGE TRANSMITTER EVERY 3 MONTHS 1 each 1     "Glucagon (BAQSIMI TWO PACK) 3 MG/DOSE nasal powder Spray 1 spray (3 mg) in nostril as needed (severe hypoglycemia) 2 each 3    Insulin Disposable Pump (OMNIPOD 5 G6 INTRO, GEN 5,) KIT 1 kit as needed 1 kit 0    Insulin Disposable Pump (OMNIPOD 5 G6 PODS, GEN 5,) MISC 1 pod every 3 days 30 each 4    insulin glargine (LANTUS VIAL) 100 UNIT/ML vial Inject 6 units subcutaneous daily ONLY IF INSULIN PUMP FAILS. 10 mL 1    insulin lispro (HUMALOG) 100 UNIT/ML Cartridge Use in pump aprox 50 units daily 50 mL 3    insulin pen needle 30G X 5 MM Use 4 pen needles daily or as directed. 120 each 12    insulin syringes, disposable, U-100 0.3 ML MISC Use for insulin ONLY IF INSULIN PUMP FAILS. 50 each 1    methylphenidate HCl ER, OSM, (CONCERTA) 18 MG CR tablet Take 1 tablet (18 mg) by mouth every morning. 30 tablet 0    sertraline (ZOLOFT) 100 MG tablet Take 1.5 tablets (150 mg) by mouth daily. 135 tablet 1    valACYclovir (VALTREX) 1000 mg tablet Take 2 tablets (2,000 mg) by mouth 2 times daily for 1 day. 4 tablet 0    Wound Dressing Adhesive (MASTISOL ADHESIVE) LIQD Use for securing sensor 15 mL 1         Vitals                                                                                                                       3, 3   There were no vitals taken for this visit.        Mental Status Exam                                                                                   9, 14 cog      Alertness: alert  and oriented  Appearance:  Casually dressed and Adequately groomed  Behavior/Demeanor: cooperative, pleasant, and calm, with good  eye contact   Speech: regular rate and rhythm  Mood :  \"ok\"  Affect:  mostly euthymic ; was congruent to mood; was congruent to content  Thought Process (Associations):  Linear, and Goal directed  Thought process (Rate):  Normal  Thought content:  no overt psychosis, denies suicidal ideation, intent or thoughts, and patient does not appear to be responding to internal " stimuli  Perception:  Reports none;  Denies auditory hallucinations and visual hallucinations  Attention/Concentration:  Normal  Memory:  Immediate recall intact and Short-term memory intact  Language: intact  Fund of Knowledge/Intelligence:  Average  Abstraction:  Normal  Insight:  Good  Judgment:  Good  Cognition: (6) does  appear grossly intact; formal cognitive testing was not done    Physical Exam     Motor activity/EPS:  Normal  Psychomotor: normal or unremarkable    Labs and Results      Pertinent findings on review include: Review of records  with relevant information reported in the HPI.  Reviewed pt's past medical record and obtained collateral information.      [place] PRESCRIPTION MONITORING PROGRAM [] was checked today:            2/18/2025     5:35 PM 3/26/2025     3:25 PM 4/22/2025     3:27 PM   PHQ   PHQ-9 Total Score 11  5  2   Q9: Thoughts of better off dead/self-harm past 2 weeks Several days Not at all Not at all   F/U: Thoughts of suicide or self-harm No     F/U: Safety concerns No         Patient-reported       RIANNA 7 Today: N/A      5/15/2024     5:05 PM 2/18/2025     5:36 PM   RIANNA-7 SCORE   Total Score 15 (severe anxiety) 9 (mild anxiety)   Total Score 15 9        Patient-reported       Recent Labs   Lab Test 04/22/25  1641 01/02/25  1016 11/27/24  1459   CR 0.89 1.11* 0.93   GFRESTIMATED 86 67 82     Recent Labs   Lab Test 11/27/24  1459 01/24/19  0821   AST 24 10   ALT  --  13     PSYCHOTROPIC DRUG INTERACTIONS:    Wellbutrin---Zoloft: Concurrent use of BUPROPION and SEIZURE THRESHOLD LOWERING AGENTS may result in increased risk of seizures.   Adderall---Wellbutrin---Zoloft: Concurrent use of AMPHETAMINES and CYP2D6 INHIBITORS may result in increased amphetamine exposure and increased risk of serotonin syndrome.   MANAGEMENT:  routine monitoring and pt is aware of the risks.    Impression/Assessment      Mallory Barajas is a 35 year old adult  who presents for med management follow up.  Pt appears  mostly stable in their mood and anxiety, denies SI, SIB or HI during the appointment. Pt noted improvement of fatigue compared to Adderall XR trial, but still feels fatigued and not alert all day since trying Concerta. Also noted BG is higher than previously. Discussed possible trial of Vyvance vs trying higher dose while monitoring fatigue. Uptodate indicates fatigue 6% risk in Adderall, 3% and Concerta and not indicated for Vyvance. Pt decided trial of Vyvance 10 mg daily while monitoring fatigue. Concerta discontinued. Will continue all other medication regimen for now. Reiterated risk of seizure with alcohol and Wellbutrin use.    Diagnosis                                                                    MDD  RIANNA  ADHD combined type  R/out binge drinking    Treatment Recommendation & Plan       Medication Ordered/Consults/Labs/tests Ordered:     Medication:   -Start Vyvance 10 mg daily for ADHD. Monitor changes in anxiety, appetite and sleep as well as fatigue.  -Concerta is discontinued.  -Continue all other medication regimen.  OTC Recommendations: none  Lab Orders:  none  Referrals: none  Release of Information: none  Future Treatment Considerations: Per symptoms. Gabapentin if alcohol craving in the future.  Return for Follow Up: in 5 weeks due to schedule conflicts    -Discussed safety plan for suicidal thoughts  -Discussed plan for suicidality  -Discussed available emergency services  -Patient agrees with the treatment plan  -Encouraged to continue outpatient therapy to gain more coping mechanism for stress.    Treatment Risk Statement: Discussed with the patient my impressions, as well as recommended studies. I educated patient on the differential diagnosis and prognosis. I discussed with the patient the risks and benefits of medications versus no interventions, including efficacy, dose, possible side effects and length of treatment and the importance of medication compliance.  The patient understands  the risks, benefits, adverse effects and alternatives. Agrees to treatment with the capacity to do so. No medical contraindications to treatment. The patient also understands the risks of using street drugs or alcohol.     CRISIS NUMBERS:   Provided routinely in AVS.      Diagnosis or treatment significantly limited by social determinants of health.    The longitudinal plan of care for the diagnosis(es)/condition(s) as documented were addressed during this visit. Due to the added complexity in care, I will continue to support Mallory in the subsequent management and with ongoing continuity of care.        Coral Adam CNP,  05/06/2025

## 2025-05-06 NOTE — NURSING NOTE
Current patient location: Patient declined to provide     Is the patient currently in the state of [place]? YES    Visit mode: VIDEO    If the visit is dropped, the patient can be reconnected by:VIDEO VISIT: Text to cell phone:   Telephone Information:   Mobile [phone]       Will anyone else be joining the visit? NO  (If patient encounters technical issues they should call [phone] :[phone])    Are changes needed to the allergy or medication list? No    Are refills needed on medications prescribed by this physician? NO    Rooming Documentation:  Unable to complete questionnaire(s) due to time    Reason for visit: THEODORE DAVISF

## 2025-05-06 NOTE — PROGRESS NOTES
"Virtual Visit Details    Type of service:  Video Visit     Originating Location (pt. Location): {video visit patient location:286887::\"Home\"}  {PROVIDER LOCATION On-site should be selected for visits conducted from your clinic location or adjoining [facility] hospital, academic office, or other nearby [facility] building. Off-site should be selected for all other provider locations, including home:[id number]}  Distant Location (provider location):  {virtual location provider:[id number]}  Platform used for Video Visit: {Virtual Visit Platforms:327989::\"Adwings\"}    "

## 2025-05-14 ENCOUNTER — TELEPHONE (OUTPATIENT)
Dept: ENDOCRINOLOGY | Facility: CLINIC | Age: 35
End: 2025-05-14

## 2025-05-14 NOTE — ORAL ONC MGMT
PA EXP: 5/30    Please route determinations to the Pharm Diabetes pool ([id number]).      Thank you!    Diabetes Care Services Team   [facility]  [address] [place] [address]

## 2025-05-17 NOTE — TELEPHONE ENCOUNTER
PA Initiation    Medication: OMNIPOD 5 [id number] PODS GEN 5 MISC  Insurance Company: [employer] ([facility]) - Phone [phone] Fax [phone]  Pharmacy Filling the Rx: [facility] - [place], [place] - [address]  Filling Pharmacy Phone: [phone]  Filling Pharmacy Fax: [phone]  Start Date: 5/17/2025

## 2025-05-19 NOTE — TELEPHONE ENCOUNTER
Prior Authorization Approval    Medication: OMNIPOD 5 [id number] PODS GEN 5 MISC  Authorization Effective Date: 5/17/2025  Authorization Expiration Date: 5/17/2026  Approved Dose/Quantity:   Reference #:     Insurance Company: [employer] ([facility]) - Phone [phone] Fax [phone]  Expected CoPay: $    CoPay Card Available:      Financial Assistance Needed:   Which Pharmacy is filling the prescription: [facility] - [place], [place] - [address]   Pharmacy Notified: YES  Patient Notified: **Instructed pharmacy to notify patient when script is ready to /ship.**

## 2025-05-27 ENCOUNTER — MYC MEDICAL ADVICE (OUTPATIENT)
Dept: PSYCHIATRY | Facility: CLINIC | Age: 35
End: 2025-05-27
Payer: COMMERCIAL

## 2025-05-27 DIAGNOSIS — F90.2 ADHD (ATTENTION DEFICIT HYPERACTIVITY DISORDER), COMBINED TYPE: Primary | ICD-10-CM

## 2025-05-27 RX ORDER — LISDEXAMFETAMINE DIMESYLATE 10 MG/1
10 CAPSULE ORAL EVERY MORNING
Qty: 30 CAPSULE | Refills: 0 | Status: SHIPPED | OUTPATIENT
Start: 2025-05-27

## 2025-05-28 NOTE — PROGRESS NOTES
Today's Date: 5/29/2025     Patient seen at the request of No ref. provider found for an opinion and evaluation of neck pain.      HISTORY OF PRESENT ILLNESS:  Mallory Barajas is a 35 year old female who presents with a chief complaint of neck pain.      She was seen today in the clinic. She describes atraumatic right-sided neck tightness and discomfort which started in February and has stayed the same over time.  All cervical range of motion elicits discomfort.  She denies any upper extremity pain, numbness, tingling, or weakness.  She denies any history of neck or back pain.    She tried ibuprofen to help manage the pain but did not notice much difference.  She has also tried ice, heat, and 2 massages.  She is not currently using any medications or other treatments for pain.    Aggravating factors include: exercise, all neck ROM  Relieving factors include: none    Today, she rates the pain 1/10.  Patient states sleep is not affected.    She denies balance problems, difficulty with fine motor tasks such as manipulating buttons or zippers, falls, weakness, bowel or bladder incontinence, saddle anesthesia, unintentional weight loss, fevers/chills, or night sweats.         PRIOR INJURIES/TREATMENT:   Ice/Heat: has tried both  Brace: none  Physical Therapy:    none     - Current Pain Medications -   None  *wellbutrin  *sertraline      - Prior/Trialed Pain Medications -   Ibuprofen PRN      Prior Procedures:  Date    Procedure   Improvement (%)  none              Prior Related Surgery: none     Other (acupuncture, OMT, CMM, TENS, DME, etc.):   Massage+    Specialists Seen - (with most recent, available notes and clinic visits reviewed)   1. none    IMAGING - reviewed   none    Review Of Systems:  I am responding to those symptoms which are directly relevant to the specific indication for my consultation. I recommend that the patient follow up with their primary or referring provider to pursue any other symptoms which may be  of concern.       Medical History:  She  has a past medical history of Depressive disorder, Diabetes (H) (5/28/2015), and NO ACTIVE PROBLEMS (aka NONE).     She  has a past surgical history that includes Arthroscopy knee bilateral.    Family History  Her family history includes Cancer in her mother; Diabetes in her maternal grandmother; Glasses (<7 y/o) in her father and mother.     Social History:  Work: 7th and .  No lifting involved    Current living situation: Lives with her wife. Performs ADLs/IADLs independently.  She  reports that she has never smoked. She has never been exposed to tobacco smoke. She has never used smokeless tobacco. She reports current alcohol use. She reports that she does not use drugs.        Current Medications:   She has a current medication list which includes the following prescription(s): acetone urine, accu-chek kingsley plus, blood glucose monitoring, blood glucose monitoring, bupropion, bupropion, dexcom g6 , dexcom g6 sensor, dexcom g6 transmitter, baqsimi two pack, omnipod 5 intro (gen 5), omnipod 5 pods (gen 5), insulin glargine, humalog, insulin pen needle, insulin syringes (disposable), lisdexamfetamine, sertraline, valacyclovir, and mastisol adhesive.     Allergies:   No Known Allergies    PHYSICAL EXAMINATION:  /87 (BP Location: Right arm, Patient Position: Sitting, Cuff Size: Adult Regular)   Pulse 76   SpO2 97%    --CONSTITUTIONAL: Vital signs as above. No acute distress. The patient is well nourished and well groomed.  --PSYCHIATRIC: The patient is awake, alert, oriented to person, place, time and answering questions appropriately with clear speech. Appropriate mood and affect   --HEENT: Sclera are non-injected. Extraocular muscles are intact. Moist oral mucosa.  --SKIN: observable skin is clean, dry, intact without rashes.  --RESPIRATORY: Normal rhythm and effort. No abnormal accessory muscle breathing patterns noted.   --GROSS MOTOR:  Easily arises from a seated position. Gait is non-antalgic. Tandem gait normal.  --CERVICAL SPINE: Inspection reveals no evidence of deformity. Range of motion is not limited in cervical flexion, extension, lateral rotation but elicits discomfort.  Localized tenderness to palpation paraspinals at the right side mid cervical spine.  Spurling maneuver negative bilaterally but elicits localized right-sided neck pain.  --UPPER EXTREMITY MOTOR TESTING:  Wrist flexion left 5/5, right 5/5  Wrist extension left 5/5, right 5/5  Biceps left 5/5, right 5/5   Triceps left 5/5, right 5/5   Shoulder abduction left 5/5, right 5/5   left 5/5, right 5/5  Finger abduction left 5/5, right 5/5  --NEUROLOGIC: CN III-XII are grossly intact.   2/4 symmetric biceps & brachioradialis reflexes bilaterally. Sensation to upper extremities is none.  Negative Pink's bilaterally.    --VASCULAR: Warm upper limbs bilaterally.       ASSESSMENT:  Malolry Barajas is a pleasant 35 year old female who presents with persistent right-sided neck discomfort and tightness since February 2025 without any known injury.    Complicating comorbities include:  # Type 1 DM, A1C 6.4 on 4/22/25    PLAN:  - She declined x-rays of the cervical spine.  She prefers to obtain x-rays only if she does not see improvement with PT/HEP  - Add PT to establish a home exercise strengthening and stretching program.  She was interested in dry needling with PT  -Add cyclobenzaprine 5-10 mg up to TID as needed to help with muscle spasm component of the pain.  We reviewed that this muscle relaxer can cause drowsiness and to avoid activities which require concentration, including driving and that this medicine should not be taken with alcohol. Patient expressed understanding.  -May use Tylenol, up to 3000 mg/day or topical over-the-counter analgesics  -Trial Thera cane  -Red flags reviewed in detail with the patient today  - RTC after 6 weeks of PT and home exercise program,, or  sooner if needed      Ready to learn, no apparent learning barriers.  Education provided on treatment plan according to patient's preferred learning style.  Patient verbalizes understanding.   __________________________________  Priscilla Hermosillo NP  Physical Medicine & Rehabilitation        20 minutes spent by me on the date of the encounter doing chart review, history and exam, documentation and further activities per the note

## 2025-05-28 NOTE — TELEPHONE ENCOUNTER
May 29, 2025 9:03 AM  Called pts pharmacy to resolve issue with Vyvanse fill.  It was removed from the que due to unknown reasons. It was ran through insurance without issue and will cost the patient about $5 for 30 day supply. No issue with supply. They will have it filled for patient today. Pt notified via [place].  Dasha Mock RN on 5/29/2025 at 9:12 AM

## 2025-05-29 ENCOUNTER — OFFICE VISIT (OUTPATIENT)
Dept: PHYSICAL MEDICINE AND REHAB | Facility: CLINIC | Age: 35
End: 2025-05-29
Payer: COMMERCIAL

## 2025-05-29 VITALS — OXYGEN SATURATION: 97 % | DIASTOLIC BLOOD PRESSURE: 87 MMHG | SYSTOLIC BLOOD PRESSURE: 127 MMHG | HEART RATE: 76 BPM

## 2025-05-29 DIAGNOSIS — M54.2 NECK PAIN: Primary | ICD-10-CM

## 2025-05-29 DIAGNOSIS — M62.838 MUSCLE SPASM: ICD-10-CM

## 2025-05-29 RX ORDER — CYCLOBENZAPRINE HCL 5 MG
5-10 TABLET ORAL 3 TIMES DAILY PRN
Qty: 30 TABLET | Refills: 0 | Status: SHIPPED | OUTPATIENT
Start: 2025-05-29

## 2025-05-29 NOTE — PATIENT INSTRUCTIONS
It was a pleasure seeing you in the clinic today.  As discussed, we made the following updates to your plan of care:     An order for physical therapy with dry needling was added today. Physical therapy schedulers should call you in the next few days to schedule an appointment. You may also call [phone] to arrange an appointment at any of the [facility] outpatient physical therapy locations.  It will be very important for you to do the physical therapy exercises on a regular basis to decrease your pain and prevent future flares of pain.     Cyclobenzaprine was prescribed today.  This is a muscle relaxer that may be taken for muscle tightness and pain as needed.  This drug can cause sedation / drowsiness, so when taking this medication avoid driving or other tasks that require you to be alert. Do not drink alcohol while taking this medication.     You can also try Tylenol (up to 3000 mg/day), heat, Biofreeze, IcyHot, or massage with an (over-the-counter) Theracane    If you develop new/severe pain, numbness, or tingling, lose control of bowel or bladder, numbness between the legs/groin area, falls, or new weakness, go to the emergency department right away     Let's follow up after 6 weeks of physical therapy and home exercise program to see how you are doing and talk about next steps.     Thank you,  Priscilla Hermosillo NP  Physical Medicine and Rehabilitation, Medical Spine  PM[facility]R clinic Phone: [phone]  [facility] Fax: [phone]

## 2025-05-29 NOTE — LETTER
5/29/2025       RE: Mallory Barajas  [address] [facility] [address]     Dear Colleague,    Thank you for referring your patient, Mallory Barajas, to the [facility] [place] at [facility]. Please see a copy of my visit note below.    Today's Date: 5/29/2025     Patient seen at the request of No ref. provider found for an opinion and evaluation of neck pain.      HISTORY OF PRESENT ILLNESS:  Mallory Barajas is a 35 year old female who presents with a chief complaint of neck pain.      She was seen today in the clinic. She describes atraumatic right-sided neck tightness and discomfort which started in February and has stayed the same over time.  All cervical range of motion elicits discomfort.  She denies any upper extremity pain, numbness, tingling, or weakness.  She denies any history of neck or back pain.    She tried ibuprofen to help manage the pain but did not notice much difference.  She has also tried ice, heat, and 2 massages.  She is not currently using any medications or other treatments for pain.    Aggravating factors include: exercise, all neck ROM  Relieving factors include: none    Today, she rates the pain 1/10.  Patient states sleep is not affected.    She denies balance problems, difficulty with fine motor tasks such as manipulating buttons or zippers, falls, weakness, bowel or bladder incontinence, saddle anesthesia, unintentional weight loss, fevers/chills, or night sweats.         PRIOR INJURIES/TREATMENT:   Ice/Heat: has tried both  Brace: none  Physical Therapy:    none     - Current Pain Medications -   None  *wellbutrin  *sertraline      - Prior/Trialed Pain Medications -   Ibuprofen PRN      Prior Procedures:  Date    Procedure   Improvement (%)  none              Prior Related Surgery: none     Other (acupuncture, OMT, CMM, TENS, DME, etc.):   Massage+    Specialists Seen - (with most recent,  available notes and clinic visits reviewed)   1. none    IMAGING - reviewed   none    Review Of Systems:  I am responding to those symptoms which are directly relevant to the specific indication for my consultation. I recommend that the patient follow up with their primary or referring provider to pursue any other symptoms which may be of concern.       Medical History:  She  has a past medical history of Depressive disorder, Diabetes (H) (5/28/2015), and NO ACTIVE PROBLEMS (aka NONE).     She  has a past surgical history that includes Arthroscopy knee bilateral.    Family History  Her family history includes Cancer in her mother; Diabetes in her maternal grandmother; Glasses (<9 y/o) in her father and mother.     Social History:  Work: 7th and .  No lifting involved    Current living situation: Lives with her wife. Performs ADLs/IADLs independently.  She  reports that she has never smoked. She has never been exposed to tobacco smoke. She has never used smokeless tobacco. She reports current alcohol use. She reports that she does not use drugs.        Current Medications:   She has a current medication list which includes the following prescription(s): acetone urine, accu-chek kingsley plus, blood glucose monitoring, blood glucose monitoring, bupropion, bupropion, dexcom g6 , dexcom g6 sensor, dexcom g6 transmitter, baqsimi two pack, omnipod 5 intro (gen 5), omnipod 5 pods (gen 5), insulin glargine, humalog, insulin pen needle, insulin syringes (disposable), lisdexamfetamine, sertraline, valacyclovir, and mastisol adhesive.     Allergies:   No Known Allergies    PHYSICAL EXAMINATION:  /87 (BP Location: Right arm, Patient Position: Sitting, Cuff Size: Adult Regular)   Pulse 76   SpO2 97%    --CONSTITUTIONAL: Vital signs as above. No acute distress. The patient is well nourished and well groomed.  --PSYCHIATRIC: The patient is awake, alert, oriented to person, place, time and answering  questions appropriately with clear speech. Appropriate mood and affect   --HEENT: Sclera are non-injected. Extraocular muscles are intact. Moist oral mucosa.  --SKIN: observable skin is clean, dry, intact without rashes.  --RESPIRATORY: Normal rhythm and effort. No abnormal accessory muscle breathing patterns noted.   --GROSS MOTOR: Easily arises from a seated position. Gait is non-antalgic. Tandem gait normal.  --CERVICAL SPINE: Inspection reveals no evidence of deformity. Range of motion is not limited in cervical flexion, extension, lateral rotation but elicits discomfort.  Localized tenderness to palpation paraspinals at the right side mid cervical spine.  Spurling maneuver negative bilaterally but elicits localized right-sided neck pain.  --UPPER EXTREMITY MOTOR TESTING:  Wrist flexion left 5/5, right 5/5  Wrist extension left 5/5, right 5/5  Biceps left 5/5, right 5/5   Triceps left 5/5, right 5/5   Shoulder abduction left 5/5, right 5/5   left 5/5, right 5/5  Finger abduction left 5/5, right 5/5  --NEUROLOGIC: CN III-XII are grossly intact.   2/4 symmetric biceps & brachioradialis reflexes bilaterally. Sensation to upper extremities is none.  Negative Pink's bilaterally.    --VASCULAR: Warm upper limbs bilaterally.       ASSESSMENT:  Mallory Barajas is a pleasant 35 year old female who presents with persistent right-sided neck discomfort and tightness since February 2025 without any known injury.    Complicating comorbities include:  # Type 1 DM, A1C 6.4 on 4/22/25    PLAN:  - She declined x-rays of the cervical spine.  She prefers to obtain x-rays only if she does not see improvement with PT/HEP  - Add PT to establish a home exercise strengthening and stretching program.  She was interested in dry needling with PT  -Add cyclobenzaprine 5-10 mg up to TID as needed to help with muscle spasm component of the pain.  We reviewed that this muscle relaxer can cause drowsiness and to avoid activities which require  concentration, including driving and that this medicine should not be taken with alcohol. Patient expressed understanding.  -May use Tylenol, up to 3000 mg/day or topical over-the-counter analgesics  -Trial Thera cane  -Red flags reviewed in detail with the patient today  - RTC after 6 weeks of PT and home exercise program,, or sooner if needed      Ready to learn, no apparent learning barriers.  Education provided on treatment plan according to patient's preferred learning style.  Patient verbalizes understanding.   __________________________________  Priscilla Hermosillo NP  Physical Medicine & Rehabilitation        20 minutes spent by me on the date of the encounter doing chart review, history and exam, documentation and further activities per the note       Again, thank you for allowing me to participate in the care of your patient.      Sincerely,    NELI Parks CNP

## 2025-06-03 ENCOUNTER — THERAPY VISIT (OUTPATIENT)
Dept: PHYSICAL THERAPY | Facility: CLINIC | Age: 35
End: 2025-06-03
Attending: NURSE PRACTITIONER
Payer: COMMERCIAL

## 2025-06-03 DIAGNOSIS — M62.838 MUSCLE SPASM: ICD-10-CM

## 2025-06-03 DIAGNOSIS — M54.2 NECK PAIN: ICD-10-CM

## 2025-06-03 DIAGNOSIS — M54.2 CERVICAL PAIN: Primary | ICD-10-CM

## 2025-06-03 PROCEDURE — 97161 PT EVAL LOW COMPLEX 20 MIN: CPT | Mod: GP | Performed by: PHYSICAL THERAPIST

## 2025-06-03 PROCEDURE — 97110 THERAPEUTIC EXERCISES: CPT | Mod: GP | Performed by: PHYSICAL THERAPIST

## 2025-06-03 ASSESSMENT — ACTIVITIES OF DAILY LIVING (ADL)
NECK_DISABILITY_IN_PERCENT: 24
NECK_DISABILITY_TOTAL_SCORE: 12
PLEASE_INDICATE_YOR_PRIMARY_REASON_FOR_REFERRAL_TO_THERAPY:: UPPER BACK, AND/OR NECK
NECK_DISABILITY_INDEX:_COUNT: 10

## 2025-06-03 NOTE — PROGRESS NOTES
PHYSICAL THERAPY EVALUATION  Type of Visit: Evaluation        Fall Risk Screen:  Have you fallen 2 or more times in the past year?: No  Have you fallen and had an injury in the past year?: No    Subjective   DOI: 2/1/25  SESAR: insdious onset of neck pain and spasms  Feels tight and is right sided-specific to the neck  Lifting, chin ups split squat aggravate it  3 days a week  Basketball, kickball and softball.  No hx of neck problems      Presenting condition or subjective complaint: neck tightness  Date of onset: 02/01/25    Relevant medical history: Depression; Diabetes   Dates & types of surgery: 3 right knee surgeries 1 left all 10 years ago or more    Prior diagnostic imaging/testing results:       Prior therapy history for the same diagnosis, illness or injury: No          Living Environment  Social support: With a significant other or spouse   Type of home: House   Stairs to enter the home: Yes 5 Is there a railing: Yes     Ramp: No   Stairs inside the home: Yes 20 Is there a railing: Yes     Help at home: None  Equipment owned:       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  Hobbies/Interests: sports,cooking,reading,walking dogs    Patient goals for therapy: move and lift without pain    Pain assessment: Pain present  Tightness and pain constant     Objective   CERVICAL SPINE EVALUATION  POSTURE: Sitting Posture: Rounded shoulders, Forward head  ROM:   (Degrees) Left AROM Right AROM    Cervical Flexion 100% and pain/tightness    Cervical Extension 100% pain and tightness    Cervical Side bend 100% and tightness, pain 100%    Cervical Rotation 100% 100%    Cervical Protrusion     Cervical Retraction     Thoracic Flexion     Thoracic Extension     Thoracic Rotation       Left AROM Left PROM Right AROM Right PROM   Shoulder Flexion       Shoulder Extension       Shoulder Abduction       Shoulder Adduction       Shoulder IR       Shoulder ER       Shoulder Horiz Abduction       Shoulder Horiz  Adduction       Spurling negative  5/5 all cervical/UE myotomes    FLEXIBILITY: pec tightness CHARANJIT   PALPATION: cervical paraspinals right C4-5 and UT      Assessment & Plan   CLINICAL IMPRESSIONS  Medical Diagnosis: neck pain, muscle spasms    Treatment Diagnosis: neck pain, muscle spasms   Impression/Assessment: Patient is a 35 year old female with cervical pain-right complaints.  The following significant findings have been identified: Pain, Decreased ROM/flexibility, and Decreased strength. These impairments interfere with their ability to perform self care tasks and recreational activities as compared to previous level of function.     Clinical Decision Making (Complexity):  Clinical Presentation: Stable/Uncomplicated  Clinical Presentation Rationale: based on medical and personal factors listed in PT evaluation  Clinical Decision Making (Complexity): Low complexity    PLAN OF CARE  Treatment Interventions:  Interventions: Manual Therapy, Neuromuscular Re-education, Therapeutic Activity, Therapeutic Exercise    Long Term Goals     PT Goal 1  Goal Identifier: driving  Goal Description: no pain with driving 60 minutes  Rationale: to maximize safety and independence with performance of ADLs and functional tasks;to maximize safety and independence within the community;to maximize safety and independence with transportation  Goal Progress: pain 5/10  Target Date: 07/29/25      Frequency of Treatment: 1 time a week  Duration of Treatment: 8 weeks    Recommended Referrals to Other Professionals: none  Education Assessment:   Learner/Method: Patient    Risks and benefits of evaluation/treatment have been explained.   Patient/Family/caregiver agrees with Plan of Care.     Evaluation Time:     PT Eval, Low Complexity Minutes (16854): 25       Signing Clinician: Douglas Carr, PT

## 2025-06-11 ENCOUNTER — VIRTUAL VISIT (OUTPATIENT)
Dept: PSYCHIATRY | Facility: CLINIC | Age: 35
End: 2025-06-11
Attending: NURSE PRACTITIONER
Payer: COMMERCIAL

## 2025-06-11 DIAGNOSIS — F33.1 MAJOR DEPRESSIVE DISORDER, RECURRENT EPISODE, MODERATE (H): ICD-10-CM

## 2025-06-11 DIAGNOSIS — F90.2 ADHD (ATTENTION DEFICIT HYPERACTIVITY DISORDER), COMBINED TYPE: Primary | ICD-10-CM

## 2025-06-11 RX ORDER — BUPROPION HYDROCHLORIDE 150 MG/1
150 TABLET ORAL EVERY MORNING
Qty: 90 TABLET | Refills: 1 | Status: CANCELLED | OUTPATIENT
Start: 2025-06-11

## 2025-06-11 RX ORDER — LISDEXAMFETAMINE DIMESYLATE 20 MG/1
20 CAPSULE ORAL EVERY MORNING
Qty: 30 CAPSULE | Refills: 0 | Status: SHIPPED | OUTPATIENT
Start: 2025-06-11

## 2025-06-11 NOTE — PROGRESS NOTES
Virtual Visit Details    Type of service:  Video Visit     Originating Location (pt. Location): Other work  Distant Location (provider location):  Off-site  Platform used for Video Visit: [facility]      Psychiatry Clinic Progress Note                                                                  Patient Name: Mallory Barajas  YOB: 1990  MRN: [id number]  Date of Service:  06/11/2025  Last Seen: 5/6/2025    Mallory Barajas is a 35 year old nonbinary adult, assigned female at birth who uses the name Mallory and pronoun she, they. Pt wants they pronoun used today for charting purpose.     Mallory Barajas is a 35 year old adult who presents for ongoing psychiatric care.  Mallory was last seen in clinic on 5/6/2025.     At that time,     Medication Ordered/Consults/Labs/tests Ordered:     Medication:   -Start Vyvance 10 mg daily for ADHD. Monitor changes in anxiety, appetite and sleep as well as fatigue.  -Concerta is discontinued.  -Continue all other medication regimen.  OTC Recommendations: none  Lab Orders:  none  Referrals: none  Release of Information: none  Future Treatment Considerations: Per symptoms. Gabapentin if alcohol craving in the future.  Return for Follow Up: in 5 weeks due to schedule conflicts    Pertinent Background:  Depression started around 18 in college. Passive SI also started during college, no hx of SA. Cutting started in college, last cutting in 2015. No psych hospitalization. Anxiety started around high school. Medical complication includes DM I.     Previous medication trials:   Medication Max Dose (mg) Dates / Duration Helpful? DC Reason / Adverse Effects?   Adderall       fatigue   Concerta       BG elevation, no appetite change, fatigue (not as significant as Adderall)   Prozac   Early 20's   ineffective                             Therapist: Shannan Chaudhary (weekly)     Interim History                                                                                                        4, 4      Since the last visit,  -Due to insurance issue delaying approval, only has been taking Vyvance for 2 weeks.  -has not noticed any changes in ADHD sxs.  -But no fatigue or BG elevation like Concerta.  -This may not be also great time to evaluate effectiveness of ADHD as routine is off. Students are done, pt will not start summer school until July.  -Sleep is changed to be able to sleep longer as currently not in school. Goes to bed 10pm and wakes up 6am instead of 5am. Does not have difficulties falling asleep or staying sleep.  -Does not have means to check BP at home.  -Interested in increasing Vyvance as she has not noticed any improvement in ADHD sxs.  -Drinking has been well managed except some more drinks on the end of school with staff.    Denies any symptoms suggestive of hypomania or psychosis.    Current Suicidality/Hx of Suicide Attempts: Denies both  CoCominent Medical concerns: DM I.    Medication Side Effects: The patient denies all medication side effects.      Medical Review of Systems     Apart from the symptoms mentioned int he HPI, the 14 point review of systems, including constitutional, HEENT, cardiovascular, respiratory, gastrointestinal, genitourinary, musculoskeletal, integumentary, endocrine, neurological, hematologic and allergic is entirely negative.    Pregnant: None. Nursing: None, Contraception: partner not sperm producing.     Substance Use     -See HPI.  -Reports having 2 weeks stretch of >5 drinks x3/week, but not drinking for 3-4 months at a time.  Goes to bed 9:30pm, falls asleep within 30 min, wakes up 4:45am. Sleep throughout the night mostly. May wake up x1 for bathroom, but able to go back to sleep easily. Feels rested in AM. Hx of DWI in 2018 in [place]. One black out at home.  -Cannabis: gummies 5 mg every 4 months.  -Denies any other substance use.    Social/ Family History                                  [per patient report]                                 1ea,1ea      -Living arrangements: lives with spouse and feels safe.   -Social Support: spouse, parents, siblings, close friends. Parents, sister, some friends in [place], otherwise they are all in [place].   -Access to gun: Denies  -Denies trauma history.   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 This year, teaching summer school.      Allergy                                Patient has no known allergies.    Current Medications                                                                                                       Current Outpatient Medications   Medication Sig Dispense Refill    acetone urine (KETOSTIX) test strip Use 1 strip to check urine ketones in the event of unexplained high blood glucose, or in the case of illness.90 day supply 50 strips 50 strip 11    blood glucose (ACCU-CHEK MACARIO PLUS) test strip USE TO TEST BLOOD SUGARS SIX TIMES DAILY OR AS DIRECTED 600 each 3    blood glucose monitoring (ACCU-CHEK MACARIO PLUS) meter device kit Use to test blood sugar 6 times daily or as directed. 1 kit 0    blood glucose monitoring (ULTRA THIN 30G) lancets Use to test blood sugar 6 times daily or as directed.whatever is covered 540 each 3    buPROPion (WELLBUTRIN XL) 150 MG 24 hr tablet Take 1 tablet (150 mg) by mouth every morning. Together with one 300 mg tablet to make total of 450 mg daily 90 tablet 1    buPROPion (WELLBUTRIN XL) 300 MG 24 hr tablet Take 1 tablet (300 mg) by mouth every morning. 90 tablet 1    Continuous Blood Gluc  (DEXCOM G6 ) GHADA Use to read blood sugars as per 's instructions. 1 each 0    Continuous Glucose Sensor (DEXCOM G6 SENSOR) MISC CHANGE SENSOR EVERY 10 DAYS 9 each 3    Continuous Glucose Transmitter (DEXCOM G6 TRANSMITTER) MISC CHANGE TRANSMITTER EVERY 3 MONTHS 1 each 1    cyclobenzaprine (FLEXERIL) 5 MG tablet Take 1-2 tablets (5-10 mg) by mouth 3 times daily as needed for muscle spasms. 30 tablet 0    Glucagon  "(BAQSIMI TWO PACK) 3 MG/DOSE nasal powder Spray 1 spray (3 mg) in nostril as needed (severe hypoglycemia) 2 each 3    Insulin Disposable Pump (OMNIPOD 5 G6 INTRO, GEN 5,) KIT 1 kit as needed 1 kit 0    Insulin Disposable Pump (OMNIPOD 5 G6 PODS, GEN 5,) MISC 1 pod every 3 days 30 each 4    insulin glargine (LANTUS VIAL) 100 UNIT/ML vial Inject 6 units subcutaneous daily ONLY IF INSULIN PUMP FAILS. 10 mL 1    insulin lispro (HUMALOG) 100 UNIT/ML Cartridge Use in pump aprox 50 units daily 50 mL 3    insulin pen needle 30G X 5 MM Use 4 pen needles daily or as directed. 120 each 12    insulin syringes, disposable, U-100 0.3 ML MISC Use for insulin ONLY IF INSULIN PUMP FAILS. 50 each 1    lisdexamfetamine (VYVANSE) 10 MG capsule Take 1 capsule (10 mg) by mouth every morning. (Patient not taking: Reported on 5/29/2025) 30 capsule 0    sertraline (ZOLOFT) 100 MG tablet Take 1.5 tablets (150 mg) by mouth daily. 135 tablet 1    valACYclovir (VALTREX) 1000 mg tablet Take 2 tablets (2,000 mg) by mouth 2 times daily for 1 day. (Patient not taking: Reported on 5/29/2025) 4 tablet 0    Wound Dressing Adhesive (MASTISOL ADHESIVE) LIQD Use for securing sensor (Patient not taking: Reported on 5/29/2025) 15 mL 1         Vitals                                                                                                                       3, 3   There were no vitals taken for this visit.        Mental Status Exam                                                                                   9, 14 cog      Alertness: alert  and oriented  Appearance:  Casually dressed and Adequately groomed  Behavior/Demeanor: cooperative, pleasant, and calm, with good  eye contact   Speech: regular rate and rhythm  Mood :  \"ok\"  Affect: mostly euthymic; was congruent to mood; was congruent to content  Thought Process (Associations):  Linear, and Goal directed  Thought process (Rate):  Normal  Thought content:  no overt psychosis, denies " suicidal ideation, intent or thoughts, and patient does not appear to be responding to internal stimuli  Perception:  Reports none;  Denies auditory hallucinations and visual hallucinations  Attention/Concentration:  Normal  Memory:  Immediate recall intact and Short-term memory intact  Language: intact  Fund of Knowledge/Intelligence:  Average  Abstraction:  Normal  Insight:  Good  Judgment:  Good  Cognition: (6) does  appear grossly intact; formal cognitive testing was not done    Physical Exam     Motor activity/EPS:  Normal  Psychomotor: normal or unremarkable    Labs and Results      Pertinent findings on review include: Review of records  with relevant information reported in the HPI.  Reviewed pt's past medical record and obtained collateral information.      [place] PRESCRIPTION MONITORING PROGRAM [] was checked today:            2/18/2025     5:35 PM 3/26/2025     3:25 PM 4/22/2025     3:27 PM   PHQ   PHQ-9 Total Score 11  5  2   Q9: Thoughts of better off dead/self-harm past 2 weeks Several days Not at all Not at all   F/U: Thoughts of suicide or self-harm No     F/U: Safety concerns No         Patient-reported       RIANNA 7 Today: N/A      5/15/2024     5:05 PM 2/18/2025     5:36 PM   RIANNA-7 SCORE   Total Score 15 (severe anxiety) 9 (mild anxiety)   Total Score 15 9        Patient-reported       Recent Labs   Lab Test 04/22/25  1641 01/02/25  1016 11/27/24  1459   CR 0.89 1.11* 0.93   GFRESTIMATED 86 67 82     Recent Labs   Lab Test 11/27/24  1459 01/24/19  0821   AST 24 10   ALT  --  13     PSYCHOTROPIC DRUG INTERACTIONS:    Wellbutrin---Zoloft: Concurrent use of BUPROPION and SEIZURE THRESHOLD LOWERING AGENTS may result in increased risk of seizures.   Vyvance---Wellbutrin---Zoloft: Concurrent use of AMPHETAMINES and CYP2D6 INHIBITORS may result in increased amphetamine exposure and increased risk of serotonin syndrome.   MANAGEMENT:  routine monitoring and pt is aware of the risks.    Impression/Assessment       Mallory Barajas is a 35 year old adult  who presents for med management follow up.  Pt appears mostly stable in their mood and anxiety, denies SI, SIB or HI during the appointment. Pt denies fatigue and BG elevation with Vyvance trial, but has not noted any improvement in ADHD sxs. Though pt has been on Vyvance 10 mg daily for 2 weeks only, would like to increase the dose as pt has not noticed any difference. Most recent /87 on 5/29/2025. OK to increase Vyvance to 20 mg daily while monitoring changes in fatigue and BG. Will continue all other medication regimen. Reiterated risk of seizure with alcohol and Wellbutrin use.    Diagnosis                                                                    MDD  RIANNA  ADHD combined type  R/out binge drinking    Treatment Recommendation & Plan       Medication Ordered/Consults/Labs/tests Ordered:     Medication:   -Increase Vyvance to 20 mg daily for ADHD. Monitor changes in fatigue, blood sugar.  -Continue all other medication regimen.  OTC Recommendations: none  Lab Orders:  none  Referrals: none  Release of Information: none  Future Treatment Considerations: Per symptoms. Gabapentin if alcohol craving in the future.  Return for Follow Up: in 4 weeks     -Discussed safety plan for suicidal thoughts  -Discussed plan for suicidality  -Discussed available emergency services  -Patient agrees with the treatment plan  -Encouraged to continue outpatient therapy to gain more coping mechanism for stress.    Treatment Risk Statement: Discussed with the patient my impressions, as well as recommended studies. I educated patient on the differential diagnosis and prognosis. I discussed with the patient the risks and benefits of medications versus no interventions, including efficacy, dose, possible side effects and length of treatment and the importance of medication compliance.  The patient understands the risks, benefits, adverse effects and alternatives. Agrees to treatment with  the capacity to do so. No medical contraindications to treatment. The patient also understands the risks of using street drugs or alcohol.     CRISIS NUMBERS:   Provided routinely in AVS.      Diagnosis or treatment significantly limited by social determinants of health.    The longitudinal plan of care for the diagnosis(es)/condition(s) as documented were addressed during this visit. Due to the added complexity in care, I will continue to support Mallory in the subsequent management and with ongoing continuity of care.        Coral Adam, RANJEET,  06/11/2025

## 2025-06-11 NOTE — PATIENT INSTRUCTIONS
-Increase Vyvance to 20 mg daily for ADHD. Monitor changes in fatigue, blood sugar.  -Continue all other medication regimen.    Your next appointment is scheduled on 7//2025 (Wed) at 2:30pm.      **For crisis resources, please see the information at the end of this document**   Patient Education    Thank you for coming to the [facility] [place] [facility].     Lab Testing:  If you had lab testing today and your results are reassuring or normal they will be mailed to you or sent through [employer] within 7 days. If the lab tests need quick action we will call you with the results. The phone number we will call with results is # [phone]. If this is not the best number please call our clinic and change the number.     Medication Refills:  If you need any refills please call your pharmacy and they will contact us. Our fax number for refills is [phone].   Three business days of notice are needed for general medication refill requests.   Five business days of notice are needed for controlled substance refill requests.   If you need to change to a different pharmacy, please contact the new pharmacy directly. The new pharmacy will help you get your medications transferred.     Contact Us:  Please call [phone] during business hours (8-5:00 M-F).   If you have medication related questions after clinic hours, or on the weekend, please call [phone].     Financial Assistance [phone]   Medical Records [phone]       MENTAL HEALTH CRISIS RESOURCES:  For a emergency help, please call 911 or go to the nearest Emergency Department.     Emergency Walk-In Options:   Em[facility] Unit @ [facility] ([place]): [phone] - Specialized mental health emergency area designed to be calming  [facility] ([place]): [phone]  [facility] ([place]): [phone]  [facility] ([place]): [phone]    [facility] Crisis  Information:   [place]: [phone]  [place]: [phone]  [place] ([place]) - Adult: [phone]     Child: [phone]  [place] - Adult: [phone]     Child: [phone]  [place]: [phone]  List of all [facility] resources:   https://[place].gov/dhs/people-we-serve/adults/health-care/mental-health/resources/crisis-contacts.jsp    National Crisis Information:   Crisis Text Line: Text  [place]  to 081706  Suicide & Crisis Lifeline: 988  National Suicide Prevention Lifeline: 3-172-404-TALK (1-664.992.3116)       For online chat options, visit https://suicidepreventionlifeline.org/chat/  Poison Control Center: 1-645.644.5851  Trans Lifeline: 1-309.591.4829 - Hotline for transgender people of all ages  The [employer] Project: 9-956-263-7977 - Hotline for LGBT youth     For Non-Emergency Support:   Fast Tracker: Mental Health & Substance Use Disorder Resources -   https://www.[employer].org/

## 2025-06-11 NOTE — NURSING NOTE
Current patient location: Pt at work.    Is the patient currently in the state of [place]? YES    Visit mode: VIDEO    If the visit is dropped, the patient can be reconnected by:VIDEO VISIT: Text to cell phone:   Telephone Information:   Mobile [phone]    and VIDEO VISIT: Send to e-mail at: [email or web address]    Will anyone else be joining the visit? NO  (If patient encounters technical issues they should call [phone])    Are changes needed to the allergy or medication list? No    Are refills needed on medications prescribed by this physician? YES    buPROPion (WELLBUTRIN XL) 150 MG 24 hr tablet  May need refill for Vyvanse as well, TBD during visit     Rooming Documentation:  Questionnaire(s) completed    Reason for visit: RECHECK    Deborah PHAM

## 2025-06-16 ENCOUNTER — MYC MEDICAL ADVICE (OUTPATIENT)
Dept: PSYCHIATRY | Facility: CLINIC | Age: 35
End: 2025-06-16
Payer: COMMERCIAL

## 2025-06-16 DIAGNOSIS — F90.2 ADHD (ATTENTION DEFICIT HYPERACTIVITY DISORDER), COMBINED TYPE: ICD-10-CM

## 2025-06-17 DIAGNOSIS — E10.9 TYPE 1 DIABETES MELLITUS WITHOUT COMPLICATION (H): ICD-10-CM

## 2025-06-17 RX ORDER — LISDEXAMFETAMINE DIMESYLATE 20 MG/1
20 CAPSULE ORAL EVERY MORNING
Qty: 30 CAPSULE | Refills: 0 | Status: SHIPPED | OUTPATIENT
Start: 2025-06-17

## 2025-06-17 RX ORDER — INSULIN PMP CART,AUT,G6/7,CNTR
1 EACH SUBCUTANEOUS SEE ADMIN INSTRUCTIONS
Qty: 30 EACH | Refills: 4 | Status: SHIPPED | OUTPATIENT
Start: 2025-06-17

## 2025-06-17 NOTE — TELEPHONE ENCOUNTER
Writer called [facility] - [place], [place] - [address]  And they confirmed that they have Vyvanse 20 mg in stock and that are able to mail this prescription out.

## 2025-06-24 ENCOUNTER — THERAPY VISIT (OUTPATIENT)
Dept: PHYSICAL THERAPY | Facility: CLINIC | Age: 35
End: 2025-06-24
Payer: COMMERCIAL

## 2025-06-24 DIAGNOSIS — M54.2 CERVICAL PAIN: Primary | ICD-10-CM

## 2025-06-24 PROCEDURE — 97110 THERAPEUTIC EXERCISES: CPT | Mod: GP | Performed by: PHYSICAL THERAPIST

## 2025-06-24 PROCEDURE — 20560 NDL INSJ W/O NJX 1 OR 2 MUSC: CPT | Performed by: PHYSICAL THERAPIST

## 2025-06-24 PROCEDURE — 97140 MANUAL THERAPY 1/> REGIONS: CPT | Mod: GP | Performed by: PHYSICAL THERAPIST

## 2025-06-25 ENCOUNTER — MYC MEDICAL ADVICE (OUTPATIENT)
Dept: INTERNAL MEDICINE | Facility: CLINIC | Age: 35
End: 2025-06-25
Payer: COMMERCIAL

## 2025-06-25 DIAGNOSIS — B00.1 COLD SORE: ICD-10-CM

## 2025-06-25 RX ORDER — VALACYCLOVIR HYDROCHLORIDE 1 G/1
2000 TABLET, FILM COATED ORAL 2 TIMES DAILY
Qty: 4 TABLET | Refills: 0 | Status: SHIPPED | OUTPATIENT
Start: 2025-06-25

## 2025-07-09 ENCOUNTER — VIRTUAL VISIT (OUTPATIENT)
Dept: PSYCHIATRY | Facility: CLINIC | Age: 35
End: 2025-07-09
Attending: NURSE PRACTITIONER
Payer: COMMERCIAL

## 2025-07-09 DIAGNOSIS — F90.2 ADHD (ATTENTION DEFICIT HYPERACTIVITY DISORDER), COMBINED TYPE: Primary | ICD-10-CM

## 2025-07-09 RX ORDER — LISDEXAMFETAMINE DIMESYLATE 30 MG/1
30 CAPSULE ORAL EVERY MORNING
Qty: 30 CAPSULE | Refills: 0 | Status: SHIPPED | OUTPATIENT
Start: 2025-07-09

## 2025-07-09 NOTE — PROGRESS NOTES
Virtual Visit Details    Type of service:  Video Visit     Originating Location (pt. Location): Other work  Distant Location (provider location):  Off-site  Platform used for Video Visit: [facility]      Psychiatry Clinic Progress Note                                                                  Patient Name: Mallory Barajas  YOB: 1990  MRN: [id number]  Date of Service:  07/09/2025  Last Seen: 6/11/2025    Mallory Barajas is a 35 year old nonbinary adult, assigned female at birth who uses the name Mallory and pronoun she, they. Pt wants they pronoun used today for charting purpose.     Mallory Barajas is a 35 year old adult who presents for ongoing psychiatric care.  Mallory was last seen in clinic on 6/11/2025.     At that time,     Medication Ordered/Consults/Labs/tests Ordered:     Medication:   -Increase Vyvance to 20 mg daily for ADHD. Monitor changes in fatigue, blood sugar.  -Continue all other medication regimen.  OTC Recommendations: none  Lab Orders:  none  Referrals: none  Release of Information: none  Future Treatment Considerations: Per symptoms. Gabapentin if alcohol craving in the future.  Return for Follow Up: in 4 weeks     Pertinent Background:  Depression started around 18 in college. Passive SI also started during college, no hx of SA. Cutting started in college, last cutting in 2015. No psych hospitalization. Anxiety started around high school. Medical complication includes DM I.     Previous medication trials:   Medication Max Dose (mg) Dates / Duration Helpful? DC Reason / Adverse Effects?   Adderall       fatigue   Concerta       BG elevation, no appetite change, fatigue (not as significant as Adderall)   Prozac   Early 20's   ineffective                             Therapist: Shannan Chaudhary (weekly)     Interim History                                                                                                        4, 4     Since the last visit,  -This is day 3 of summer school, last week had  orientation. This has been going OK.  -has not noticed significant changes in symptoms at work, but without typical school routine and work task differences during regular school schedule vs summer school, difficult to know if this is environment or medication.  -But at home, noted able to complete little more house tasks, also able to use organizer to track home tasks needed to complete.   -No fluctuation of fatigue or BG like Concerta.  -Mood and anxiety are fairly well managed, denies SI, SIB or HI.  -Does not have means to check BP at home. But can monitor this at pharmacy.  -Interested in increasing Vyvance as she has not noticed any improvement in ADHD sxs.    Denies any symptoms suggestive of hypomania or psychosis.    Current Suicidality/Hx of Suicide Attempts: Denies both  CoCominent Medical concerns: DM I.    Medication Side Effects: The patient denies all medication side effects.      Medical Review of Systems     Apart from the symptoms mentioned int he HPI, the 14 point review of systems, including constitutional, HEENT, cardiovascular, respiratory, gastrointestinal, genitourinary, musculoskeletal, integumentary, endocrine, neurological, hematologic and allergic is entirely negative.    Pregnant: None. Nursing: None, Contraception: partner not sperm producing.     Substance Use     -See HPI.  -Reports having 2 weeks stretch of >5 drinks x3/week, but not drinking for 3-4 months at a time.  Goes to bed 9:30pm, falls asleep within 30 min, wakes up 4:45am. Sleep throughout the night mostly. May wake up x1 for bathroom, but able to go back to sleep easily. Feels rested in AM. Hx of DWI in 2018 in [place]. One black out at home.  -Cannabis: gummies 5 mg every 4 months.  -Denies any other substance use.    Social/ Family History                                  [per patient report]                                 1ea,1ea     -Living arrangements: lives with spouse and feels safe.   -Social Support: spouse, parents,  siblings, close friends. Parents, sister, some friends in [place], otherwise they are all in [place].   -Access to gun: Denies  -Denies trauma history.   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 This year, teaching summer school.      Allergy                                Patient has no known allergies.    Current Medications                                                                                                       Current Outpatient Medications   Medication Sig Dispense Refill    acetone urine (KETOSTIX) test strip Use 1 strip to check urine ketones in the event of unexplained high blood glucose, or in the case of illness.90 day supply 50 strips 50 strip 11    blood glucose (ACCU-CHEK MACARIO PLUS) test strip USE TO TEST BLOOD SUGARS SIX TIMES DAILY OR AS DIRECTED 600 each 3    blood glucose monitoring (ACCU-CHEK MACARIO PLUS) meter device kit Use to test blood sugar 6 times daily or as directed. 1 kit 0    blood glucose monitoring (ULTRA THIN 30G) lancets Use to test blood sugar 6 times daily or as directed.whatever is covered 540 each 3    buPROPion (WELLBUTRIN XL) 150 MG 24 hr tablet Take 1 tablet (150 mg) by mouth every morning. Together with one 300 mg tablet to make total of 450 mg daily 90 tablet 1    buPROPion (WELLBUTRIN XL) 300 MG 24 hr tablet Take 1 tablet (300 mg) by mouth every morning. 90 tablet 1    Continuous Blood Gluc  (DEXCOM G6 ) GHADA Use to read blood sugars as per 's instructions. 1 each 0    Continuous Glucose Sensor (DEXCOM G6 SENSOR) MISC CHANGE SENSOR EVERY 10 DAYS 9 each 3    Continuous Glucose Transmitter (DEXCOM G6 TRANSMITTER) MISC CHANGE TRANSMITTER EVERY 3 MONTHS 1 each 1    cyclobenzaprine (FLEXERIL) 5 MG tablet Take 1-2 tablets (5-10 mg) by mouth 3 times daily as needed for muscle spasms. 30 tablet 0    Glucagon (BAQSIMI TWO PACK) 3 MG/DOSE nasal powder Spray 1 spray (3 mg) in nostril as needed (severe  "hypoglycemia) 2 each 3    Insulin Disposable Pump (OMNIPOD 5 G6 INTRO, GEN 5,) KIT 1 kit as needed 1 kit 0    Insulin Disposable Pump (OMNIPOD 5 PODS, GEN 5,) MISC 1 pod See Admin Instructions. Change every 2-3 days 30 each 4    insulin glargine (LANTUS VIAL) 100 UNIT/ML vial Inject 6 units subcutaneous daily ONLY IF INSULIN PUMP FAILS. 10 mL 1    insulin lispro (HUMALOG) 100 UNIT/ML Cartridge Use in pump aprox 50 units daily 50 mL 3    insulin pen needle 30G X 5 MM Use 4 pen needles daily or as directed. 120 each 12    insulin syringes, disposable, U-100 0.3 ML MISC Use for insulin ONLY IF INSULIN PUMP FAILS. 50 each 1    lisdexamfetamine (VYVANSE) 20 MG capsule Take 1 capsule (20 mg) by mouth every morning. 30 capsule 0    sertraline (ZOLOFT) 100 MG tablet Take 1.5 tablets (150 mg) by mouth daily. 135 tablet 1    valACYclovir (VALTREX) 1000 mg tablet Take 2 tablets (2,000 mg) by mouth 2 times daily. 4 tablet 3    Wound Dressing Adhesive (MASTISOL ADHESIVE) LIQD Use for securing sensor (Patient not taking: Reported on 5/29/2025) 15 mL 1         Vitals                                                                                                                       3, 3   There were no vitals taken for this visit.        Mental Status Exam                                                                                   9, 14 cog      Alertness: alert  and oriented  Appearance:  Casually dressed and Adequately groomed  Behavior/Demeanor: cooperative, pleasant, and calm, with good  eye contact   Speech: regular rate and rhythm  Mood :  \"ok\"  Affect: mostly euthymic; was congruent to mood; was congruent to content  Thought Process (Associations):  Linear, and Goal directed  Thought process (Rate):  Normal  Thought content:  no overt psychosis, denies suicidal ideation, intent or thoughts, and patient does not appear to be responding to internal stimuli  Perception:  Reports none;  Denies auditory hallucinations and " visual hallucinations  Attention/Concentration:  Normal  Memory:  Immediate recall intact and Short-term memory intact  Language: intact  Fund of Knowledge/Intelligence:  Average  Abstraction:  Normal  Insight:  Good  Judgment:  Good  Cognition: (6) does  appear grossly intact; formal cognitive testing was not done    Physical Exam     Motor activity/EPS:  Normal  Psychomotor: normal or unremarkable    Labs and Results      Pertinent findings on review include: Review of records  with relevant information reported in the HPI.  Reviewed pt's past medical record and obtained collateral information.      [place] PRESCRIPTION MONITORING PROGRAM [] was checked today:            2/18/2025     5:35 PM 3/26/2025     3:25 PM 4/22/2025     3:27 PM   PHQ   PHQ-9 Total Score 11  5  2   Q9: Thoughts of better off dead/self-harm past 2 weeks Several days Not at all Not at all   F/U: Thoughts of suicide or self-harm No     F/U: Safety concerns No         Patient-reported       RIANNA 7 Today: N/A      5/15/2024     5:05 PM 2/18/2025     5:36 PM   RIANNA-7 SCORE   Total Score 15 (severe anxiety) 9 (mild anxiety)   Total Score 15 9        Patient-reported       Recent Labs   Lab Test 04/22/25  1641 01/02/25  1016 11/27/24  1459   CR 0.89 1.11* 0.93   GFRESTIMATED 86 67 82     Recent Labs   Lab Test 11/27/24  1459 01/24/19  0821   AST 24 10   ALT  --  13     PSYCHOTROPIC DRUG INTERACTIONS:    Wellbutrin---Zoloft: Concurrent use of BUPROPION and SEIZURE THRESHOLD LOWERING AGENTS may result in increased risk of seizures.   Vyvance---Wellbutrin---Zoloft: Concurrent use of AMPHETAMINES and CYP2D6 INHIBITORS may result in increased amphetamine exposure and increased risk of serotonin syndrome.   MANAGEMENT:  routine monitoring and pt is aware of the risks.    Impression/Assessment      Mallory Barajas is a 35 year old adult  who presents for med management follow up.  Pt appears mostly stable in their mood and anxiety, denies SI, SIB or HI during the  appointment. Pt denies fatigue and BG elevation with Vyvance increase. Slight improvement in home task completion and tracking, but difficult to know work task as work task and routine is different during the summer school. Pt would like to further increase Vyvance. No BP check since last Vyvance increase, but historically BP WNL and pt can check BP at pharmacy. OK to increase Vyvance to 30 mg daily while monitoring BP. Pt was instructed to take x2/week BP and if consistently >140/90 to notify this writer. Will continue all other medication regimen.    Diagnosis                                                                    MDD  RIANNA  ADHD combined type  R/out binge drinking    Treatment Recommendation & Plan       Medication Ordered/Consults/Labs/tests Ordered:     Medication:   -Increase Vyvance to 30 mg daily. Monitor changes in fatigue and blood glucose level. Also please check blood pressure 2 times a week and report them back to niels. If blood pressure is consistently over 140/90, please let niels know.  -Continue all other medication regimen.  OTC Recommendations: none  Lab Orders:  none  Referrals: none  Release of Information: none  Future Treatment Considerations: Per symptoms. Gabapentin if alcohol craving in the future.  Return for Follow Up: in 5 weeks due to pt's schedule availability    -Discussed safety plan for suicidal thoughts  -Discussed plan for suicidality  -Discussed available emergency services  -Patient agrees with the treatment plan  -Encouraged to continue outpatient therapy to gain more coping mechanism for stress.    Treatment Risk Statement: Discussed with the patient my impressions, as well as recommended studies. I educated patient on the differential diagnosis and prognosis. I discussed with the patient the risks and benefits of medications versus no interventions, including efficacy, dose, possible side effects and length of treatment and the importance of medication  compliance.  The patient understands the risks, benefits, adverse effects and alternatives. Agrees to treatment with the capacity to do so. No medical contraindications to treatment. The patient also understands the risks of using street drugs or alcohol.     CRISIS NUMBERS:   Provided routinely in AVS.      Diagnosis or treatment significantly limited by social determinants of health.    The longitudinal plan of care for the diagnosis(es)/condition(s) as documented were addressed during this visit. Due to the added complexity in care, I will continue to support Mallory in the subsequent management and with ongoing continuity of care.        Coral Adam CNP,  07/09/2025

## 2025-07-09 NOTE — PROGRESS NOTES
"Virtual Visit Details    Type of service:  Video Visit     Originating Location (pt. Location): {video visit patient location:727566::\"Home\"}  {PROVIDER LOCATION On-site should be selected for visits conducted from your clinic location or adjoining [facility] hospital, academic office, or other nearby [facility] building. Off-site should be selected for all other provider locations, including home:981053}  Distant Location (provider location):  {virtual location provider:345911}  Platform used for Video Visit: {Virtual Visit Platforms:035190::\"Cloud Theory\"}    "

## 2025-07-09 NOTE — NURSING NOTE
Current patient location: [address] [place] [id number]    Is the patient currently in the state of [place]? YES    Visit mode: VIDEO    If the visit is dropped, the patient can be reconnected by:VIDEO VISIT: Send to e-mail at: [email or web address]    Will anyone else be joining the visit? NO  (If patient encounters technical issues they should call [phone]    Are changes needed to the allergy or medication list? No    Are refills needed on medications prescribed by this physician? Discuss with provider    Rooming Documentation:  Patient will complete questionnaire(s) in [facility]    Reason for visit: No chief complaint on file.    Mili DAVISF

## 2025-07-09 NOTE — PATIENT INSTRUCTIONS
-Increase Vyvance to 30 mg daily. Monitor changes in fatigue and blood glucose level. Also please check blood pressure 2 times a week and report them back to niels. If blood pressure is consistently over 140/90, please let niels know.  -Continue all other medication regimen.    Your next appointment is scheduled on 8/8/2025 (Fri) at 9am.      **For crisis resources, please see the information at the end of this document**   Patient Education    Thank you for coming to the [facility] [place].     Lab Testing:  If you had lab testing today and your results are reassuring or normal they will be mailed to you or sent through [employer] within 7 days. If the lab tests need quick action we will call you with the results. The phone number we will call with results is # [phone]. If this is not the best number please call our clinic and change the number.     Medication Refills:  If you need any refills please call your pharmacy and they will contact us. Our fax number for refills is [phone].   Three business days of notice are needed for general medication refill requests.   Five business days of notice are needed for controlled substance refill requests.   If you need to change to a different pharmacy, please contact the new pharmacy directly. The new pharmacy will help you get your medications transferred.     Contact Us:  Please call [phone] during business hours (8-5:00 M-F).   If you have medication related questions after clinic hours, or on the weekend, please call [phone].     Financial Assistance [phone]   Medical Records [phone]       MENTAL HEALTH CRISIS RESOURCES:  For a emergency help, please call 911 or go to the nearest Emergency Department.     Emergency Walk-In Options:   [facility] @ [facility] ([place]): [phone] - Specialized mental health emergency area designed to be calming  [facility]  ([place]): [phone]  [facility] ([place]): [phone]  [facility] ([place]): [phone]    [facility] Crisis Information:   Keshav: [phone]  Yared: [phone]  Alexa (JUSTINA) - Adult: [phone]     Child: [phone]  Efren - Adult: [phone]     Child: [phone]  [place]: [phone]  List of all [facility] resources:   https://[place].[email or web address]/dhs/people-we-serve/adults/health-care/mental-health/resources/crisis-contacts.jsp    National Crisis Information:   Crisis Text Line: Text  [place]  to 065805  Suicide & Crisis Lifeline: 988  National Suicide Prevention Lifeline: 1-568-265-TALK (1-730.647.8504)       For online chat options, visit https://suicidepreventionlifeline.org/chat/  Poison Control Center: 7-699-276-4126  Trans Lifeline: 0-475-050-2023 - Hotline for transgender people of all ages  The [employer] Project: 4-995-016-2059 - Hotline for LGBT youth     For Non-Emergency Support:   Fast Tracker: Mental Health & Substance Use Disorder Resources -   https://www.[employer].org/

## 2025-07-16 ENCOUNTER — MYC REFILL (OUTPATIENT)
Dept: ENDOCRINOLOGY | Facility: CLINIC | Age: 35
End: 2025-07-16
Payer: COMMERCIAL

## 2025-07-16 DIAGNOSIS — E10.9 TYPE 1 DIABETES MELLITUS WITHOUT COMPLICATION (H): ICD-10-CM

## 2025-07-17 RX ORDER — PROCHLORPERAZINE 25 MG/1
SUPPOSITORY RECTAL
Qty: 1 EACH | Refills: 3 | Status: SHIPPED | OUTPATIENT
Start: 2025-07-17